# Patient Record
Sex: FEMALE | Race: WHITE | NOT HISPANIC OR LATINO | Employment: FULL TIME | URBAN - METROPOLITAN AREA
[De-identification: names, ages, dates, MRNs, and addresses within clinical notes are randomized per-mention and may not be internally consistent; named-entity substitution may affect disease eponyms.]

---

## 2023-03-07 ENCOUNTER — TELEPHONE (OUTPATIENT)
Dept: PRIMARY CARE | Facility: CLINIC | Age: 58
End: 2023-03-07
Payer: COMMERCIAL

## 2023-03-07 DIAGNOSIS — R92.8 ABNORMAL MAMMOGRAM OF LEFT BREAST: Primary | ICD-10-CM

## 2023-03-07 NOTE — TELEPHONE ENCOUNTER
----- Message from Miranda BOSS DO sent at 3/7/2023 11:42 AM EST -----  Mammogram showed abnormal left breast on mammogram  Will need diagnostic with ultrasound; orders placed

## 2023-03-27 ENCOUNTER — TELEPHONE (OUTPATIENT)
Dept: PRIMARY CARE | Facility: CLINIC | Age: 58
End: 2023-03-27
Payer: COMMERCIAL

## 2023-03-27 NOTE — TELEPHONE ENCOUNTER
----- Message from Miranda BOSS DO sent at 3/26/2023  7:03 PM EDT -----  Ultrasound of breast showed a benign cyst where mammogram saw abnormality   Return to 1 year screening

## 2023-10-11 PROBLEM — B02.9 HERPES ZOSTER WITHOUT COMPLICATION: Status: ACTIVE | Noted: 2023-10-11

## 2023-10-11 PROBLEM — R74.8 ELEVATED ALKALINE PHOSPHATASE LEVEL: Status: ACTIVE | Noted: 2023-10-11

## 2023-10-11 PROBLEM — M54.50 LOW BACK PAIN: Status: ACTIVE | Noted: 2023-10-11

## 2023-10-11 PROBLEM — M25.569 KNEE PAIN: Status: ACTIVE | Noted: 2023-10-11

## 2023-10-11 PROBLEM — E66.01 MORBID OBESITY WITH BODY MASS INDEX (BMI) OF 40.0 OR HIGHER (MULTI): Status: ACTIVE | Noted: 2023-10-11

## 2023-10-11 PROBLEM — M17.11 PRIMARY LOCALIZED OSTEOARTHROSIS OF RIGHT LOWER LEG: Status: ACTIVE | Noted: 2023-10-11

## 2023-10-11 RX ORDER — MELATONIN 10 MG
10 CAPSULE ORAL NIGHTLY PRN
COMMUNITY

## 2023-10-11 RX ORDER — OXYCODONE HYDROCHLORIDE 5 MG/1
TABLET ORAL
COMMUNITY
Start: 2023-02-05 | End: 2023-11-03 | Stop reason: WASHOUT

## 2023-10-11 RX ORDER — ACETAMINOPHEN AND CODEINE PHOSPHATE 300; 30 MG/1; MG/1
1 TABLET ORAL EVERY 4 HOURS PRN
COMMUNITY
Start: 2022-12-28 | End: 2023-11-03 | Stop reason: WASHOUT

## 2023-10-11 RX ORDER — OFLOXACIN 3 MG/ML
SOLUTION/ DROPS OPHTHALMIC
COMMUNITY
Start: 2022-12-27 | End: 2023-11-03 | Stop reason: WASHOUT

## 2023-10-11 RX ORDER — MULTIVITAMIN
1 TABLET ORAL DAILY
COMMUNITY

## 2023-10-11 RX ORDER — BRIMONIDINE TARTRATE, TIMOLOL MALEATE 2; 5 MG/ML; MG/ML
SOLUTION/ DROPS OPHTHALMIC
COMMUNITY
Start: 2023-01-06 | End: 2023-11-03 | Stop reason: WASHOUT

## 2023-10-11 RX ORDER — ACETAMINOPHEN AND DIPHENHYDRAMINE HYDROCHLORIDE 500; 25 MG/1; MG/1
1 TABLET, FILM COATED ORAL NIGHTLY PRN
COMMUNITY

## 2023-10-11 RX ORDER — PREDNISOLONE ACETATE 10 MG/ML
SUSPENSION/ DROPS OPHTHALMIC
COMMUNITY
Start: 2022-12-27 | End: 2023-11-03 | Stop reason: WASHOUT

## 2023-11-03 ENCOUNTER — OFFICE VISIT (OUTPATIENT)
Dept: PRIMARY CARE | Facility: CLINIC | Age: 58
End: 2023-11-03
Payer: COMMERCIAL

## 2023-11-03 ENCOUNTER — ANCILLARY PROCEDURE (OUTPATIENT)
Dept: RADIOLOGY | Facility: CLINIC | Age: 58
End: 2023-11-03
Payer: COMMERCIAL

## 2023-11-03 VITALS
WEIGHT: 270 LBS | BODY MASS INDEX: 40.92 KG/M2 | DIASTOLIC BLOOD PRESSURE: 82 MMHG | HEIGHT: 68 IN | RESPIRATION RATE: 18 BRPM | HEART RATE: 80 BPM | SYSTOLIC BLOOD PRESSURE: 116 MMHG

## 2023-11-03 DIAGNOSIS — F41.9 ANXIETY: ICD-10-CM

## 2023-11-03 DIAGNOSIS — Z13.29 SCREENING FOR THYROID DISORDER: ICD-10-CM

## 2023-11-03 DIAGNOSIS — Z13.1 SCREENING FOR DIABETES MELLITUS: ICD-10-CM

## 2023-11-03 DIAGNOSIS — E66.01 MORBID OBESITY WITH BODY MASS INDEX (BMI) OF 40.0 OR HIGHER (MULTI): ICD-10-CM

## 2023-11-03 DIAGNOSIS — Z12.31 ENCOUNTER FOR SCREENING MAMMOGRAM FOR MALIGNANT NEOPLASM OF BREAST: ICD-10-CM

## 2023-11-03 DIAGNOSIS — Z00.00 ROUTINE GENERAL MEDICAL EXAMINATION AT A HEALTH CARE FACILITY: Primary | ICD-10-CM

## 2023-11-03 DIAGNOSIS — Z23 NEED FOR INFLUENZA VACCINATION: ICD-10-CM

## 2023-11-03 DIAGNOSIS — S89.90XA KNEE INJURY, INITIAL ENCOUNTER: ICD-10-CM

## 2023-11-03 DIAGNOSIS — Z13.220 SCREENING FOR LIPID DISORDERS: ICD-10-CM

## 2023-11-03 DIAGNOSIS — Z72.820 POOR SLEEP: ICD-10-CM

## 2023-11-03 PROBLEM — B02.9 HERPES ZOSTER WITHOUT COMPLICATION: Status: RESOLVED | Noted: 2023-10-11 | Resolved: 2023-11-03

## 2023-11-03 LAB
ALBUMIN SERPL BCP-MCNC: 4.3 G/DL (ref 3.4–5)
ALP SERPL-CCNC: 98 U/L (ref 33–110)
ALT SERPL W P-5'-P-CCNC: 18 U/L (ref 7–45)
ANION GAP SERPL CALC-SCNC: 13 MMOL/L (ref 10–20)
APPEARANCE UR: CLEAR
AST SERPL W P-5'-P-CCNC: 13 U/L (ref 9–39)
BILIRUB SERPL-MCNC: 0.6 MG/DL (ref 0–1.2)
BILIRUB UR STRIP.AUTO-MCNC: NEGATIVE MG/DL
BUN SERPL-MCNC: 13 MG/DL (ref 6–23)
CALCIUM SERPL-MCNC: 9.3 MG/DL (ref 8.6–10.6)
CHLORIDE SERPL-SCNC: 108 MMOL/L (ref 98–107)
CHOLEST SERPL-MCNC: 141 MG/DL (ref 0–199)
CHOLESTEROL/HDL RATIO: 2.7
CO2 SERPL-SCNC: 24 MMOL/L (ref 21–32)
COLOR UR: ABNORMAL
CREAT SERPL-MCNC: 0.87 MG/DL (ref 0.5–1.05)
ERYTHROCYTE [DISTWIDTH] IN BLOOD BY AUTOMATED COUNT: 13.4 % (ref 11.5–14.5)
EST. AVERAGE GLUCOSE BLD GHB EST-MCNC: 97 MG/DL
GFR SERPL CREATININE-BSD FRML MDRD: 77 ML/MIN/1.73M*2
GLUCOSE SERPL-MCNC: 73 MG/DL (ref 74–99)
GLUCOSE UR STRIP.AUTO-MCNC: NEGATIVE MG/DL
HBA1C MFR BLD: 5 %
HCT VFR BLD AUTO: 45.8 % (ref 36–46)
HDLC SERPL-MCNC: 51.3 MG/DL
HGB BLD-MCNC: 14.8 G/DL (ref 12–16)
KETONES UR STRIP.AUTO-MCNC: NEGATIVE MG/DL
LDLC SERPL CALC-MCNC: 78 MG/DL
LEUKOCYTE ESTERASE UR QL STRIP.AUTO: NEGATIVE
MCH RBC QN AUTO: 29 PG (ref 26–34)
MCHC RBC AUTO-ENTMCNC: 32.3 G/DL (ref 32–36)
MCV RBC AUTO: 90 FL (ref 80–100)
NITRITE UR QL STRIP.AUTO: NEGATIVE
NON HDL CHOLESTEROL: 90 MG/DL (ref 0–149)
NRBC BLD-RTO: 0 /100 WBCS (ref 0–0)
PH UR STRIP.AUTO: 7 [PH]
PLATELET # BLD AUTO: 270 X10*3/UL (ref 150–450)
POTASSIUM SERPL-SCNC: 4.1 MMOL/L (ref 3.5–5.3)
PROT SERPL-MCNC: 6.3 G/DL (ref 6.4–8.2)
PROT UR STRIP.AUTO-MCNC: NEGATIVE MG/DL
RBC # BLD AUTO: 5.1 X10*6/UL (ref 4–5.2)
RBC # UR STRIP.AUTO: NEGATIVE /UL
SODIUM SERPL-SCNC: 141 MMOL/L (ref 136–145)
SP GR UR STRIP.AUTO: 1
TRIGL SERPL-MCNC: 60 MG/DL (ref 0–149)
TSH SERPL-ACNC: 2.3 MIU/L (ref 0.44–3.98)
UROBILINOGEN UR STRIP.AUTO-MCNC: <2 MG/DL
VLDL: 12 MG/DL (ref 0–40)
WBC # BLD AUTO: 7.4 X10*3/UL (ref 4.4–11.3)

## 2023-11-03 PROCEDURE — 83036 HEMOGLOBIN GLYCOSYLATED A1C: CPT

## 2023-11-03 PROCEDURE — 73564 X-RAY EXAM KNEE 4 OR MORE: CPT | Mod: LT,FY

## 2023-11-03 PROCEDURE — 80053 COMPREHEN METABOLIC PANEL: CPT

## 2023-11-03 PROCEDURE — 99396 PREV VISIT EST AGE 40-64: CPT | Performed by: INTERNAL MEDICINE

## 2023-11-03 PROCEDURE — 80061 LIPID PANEL: CPT

## 2023-11-03 PROCEDURE — 36415 COLL VENOUS BLD VENIPUNCTURE: CPT

## 2023-11-03 PROCEDURE — 81003 URINALYSIS AUTO W/O SCOPE: CPT

## 2023-11-03 PROCEDURE — 90686 IIV4 VACC NO PRSV 0.5 ML IM: CPT | Performed by: INTERNAL MEDICINE

## 2023-11-03 PROCEDURE — 1036F TOBACCO NON-USER: CPT | Performed by: INTERNAL MEDICINE

## 2023-11-03 PROCEDURE — 90471 IMMUNIZATION ADMIN: CPT | Performed by: INTERNAL MEDICINE

## 2023-11-03 PROCEDURE — 85027 COMPLETE CBC AUTOMATED: CPT

## 2023-11-03 PROCEDURE — 73564 X-RAY EXAM KNEE 4 OR MORE: CPT | Mod: LEFT SIDE | Performed by: RADIOLOGY

## 2023-11-03 PROCEDURE — 84443 ASSAY THYROID STIM HORMONE: CPT

## 2023-11-03 RX ORDER — TRAZODONE HYDROCHLORIDE 50 MG/1
50 TABLET ORAL NIGHTLY
Qty: 30 TABLET | Refills: 0 | Status: SHIPPED | OUTPATIENT
Start: 2023-11-03 | End: 2023-11-13 | Stop reason: SDUPTHER

## 2023-11-03 ASSESSMENT — ENCOUNTER SYMPTOMS
NAUSEA: 0
DIARRHEA: 0
SHORTNESS OF BREATH: 0
NECK PAIN: 0
NUMBNESS: 0
APNEA: 0
TREMORS: 0
MYALGIAS: 0
AGITATION: 0
CONSTIPATION: 0
RECTAL PAIN: 0
DECREASED CONCENTRATION: 0
COLOR CHANGE: 0
ADENOPATHY: 0
NECK STIFFNESS: 0
DIFFICULTY URINATING: 0
HEADACHES: 0
SPEECH DIFFICULTY: 0
DIZZINESS: 0
POLYPHAGIA: 0
SORE THROAT: 0
PHOTOPHOBIA: 0
JOINT SWELLING: 0
STRIDOR: 0
ABDOMINAL PAIN: 0
FATIGUE: 0
ACTIVITY CHANGE: 0
EYE REDNESS: 0
VOICE CHANGE: 0
BACK PAIN: 0
COUGH: 0
FEVER: 0
TROUBLE SWALLOWING: 0
WEAKNESS: 0
HEMATURIA: 0
ARTHRALGIAS: 1
NERVOUS/ANXIOUS: 1
LIGHT-HEADEDNESS: 0
HYPERACTIVE: 0
CHEST TIGHTNESS: 0
UNEXPECTED WEIGHT CHANGE: 0
CONFUSION: 0
CHILLS: 0
BLOOD IN STOOL: 0
POLYDIPSIA: 0
ANAL BLEEDING: 0
EYE ITCHING: 0
PALPITATIONS: 0
DIAPHORESIS: 0
SINUS PAIN: 0
APPETITE CHANGE: 0
FACIAL ASYMMETRY: 0
FREQUENCY: 0
SLEEP DISTURBANCE: 1
ABDOMINAL DISTENTION: 0
EYE PAIN: 0
BRUISES/BLEEDS EASILY: 0
FLANK PAIN: 0
SEIZURES: 0
RHINORRHEA: 0
VOMITING: 0
FACIAL SWELLING: 0
WHEEZING: 0
DYSPHORIC MOOD: 0
HALLUCINATIONS: 0
SINUS PRESSURE: 0
DYSURIA: 0
CHOKING: 0
EYE DISCHARGE: 0
WOUND: 0

## 2023-11-03 NOTE — PROGRESS NOTES
Subjective   Patient ID: Cristiana Huddleston is a 57 y.o. female who presents for Annual Exam.    HPI    Pt here for physical. She is up in weight-10 lbs since last year.  She fell in 10/2023 and so now is limping and using cane to ambulate.  She fell going up a step and onto her left knee.  It is still swollen, bruised and painful.  She is wearing an elastic brace and using cane.  She did not seek care for this.    She also fell back in 2/2023 and broke her left elbow.  No surgery needed-just casted.      She has more stress with her  who had a TBI and has mood/personality issues.  She does not sleep well and often wakes and mind is racing.      She has not seen a GYN in many years.  She last had pap in 2019 with negative HPV.  No pelvic issues or urinary issues.      She had abnormal mammogram back in 3/2023 but on repeat with ultrasound was noted to be benign.  No breast issues.      She did cologuard back in 12/2022 and it was negative.  No GI issues.      She sees derm regularly and had some skin lesions removed back in summer.  They were benign in nature.    Review of Systems   Constitutional:  Negative for activity change, appetite change, chills, diaphoresis, fatigue, fever and unexpected weight change.   HENT:  Negative for congestion, dental problem, drooling, ear discharge, ear pain, facial swelling, hearing loss, mouth sores, nosebleeds, postnasal drip, rhinorrhea, sinus pressure, sinus pain, sneezing, sore throat, tinnitus, trouble swallowing and voice change.    Eyes:  Positive for visual disturbance (wears glasses-had recent exam). Negative for photophobia, pain, discharge, redness and itching.   Respiratory:  Negative for apnea, cough, choking, chest tightness, shortness of breath, wheezing and stridor.    Cardiovascular:  Negative for chest pain, palpitations and leg swelling.   Gastrointestinal:  Negative for abdominal distention, abdominal pain, anal bleeding, blood in stool, constipation,  "diarrhea, nausea, rectal pain and vomiting.   Endocrine: Negative for cold intolerance, heat intolerance, polydipsia, polyphagia and polyuria.   Genitourinary:  Negative for decreased urine volume, difficulty urinating, dyspareunia, dysuria, enuresis, flank pain, frequency, genital sores, hematuria, menstrual problem, pelvic pain, urgency, vaginal bleeding, vaginal discharge and vaginal pain.   Musculoskeletal:  Positive for arthralgias. Negative for back pain, gait problem, joint swelling, myalgias, neck pain and neck stiffness.   Skin:  Negative for color change, pallor, rash and wound.   Allergic/Immunologic: Negative for environmental allergies, food allergies and immunocompromised state.   Neurological:  Negative for dizziness, tremors, seizures, syncope, facial asymmetry, speech difficulty, weakness, light-headedness, numbness and headaches.   Hematological:  Negative for adenopathy. Does not bruise/bleed easily.   Psychiatric/Behavioral:  Positive for sleep disturbance. Negative for agitation, behavioral problems, confusion, decreased concentration, dysphoric mood, hallucinations, self-injury and suicidal ideas. The patient is nervous/anxious. The patient is not hyperactive.        Objective   /82 (BP Location: Left arm, Patient Position: Sitting)   Pulse 80   Resp 18   Ht 1.734 m (5' 8.25\")   Wt 122 kg (270 lb)   BMI 40.75 kg/m²    Physical Exam  Constitutional:       Appearance: Normal appearance.   HENT:      Head: Normocephalic and atraumatic.      Right Ear: Tympanic membrane, ear canal and external ear normal. There is no impacted cerumen.      Left Ear: Tympanic membrane, ear canal and external ear normal. There is no impacted cerumen.      Nose: Nose normal. No congestion or rhinorrhea.      Mouth/Throat:      Mouth: Mucous membranes are moist.      Pharynx: Oropharynx is clear. No oropharyngeal exudate or posterior oropharyngeal erythema.   Eyes:      Extraocular Movements: Extraocular " movements intact.      Conjunctiva/sclera: Conjunctivae normal.      Pupils: Pupils are equal, round, and reactive to light.   Neck:      Vascular: No carotid bruit.   Cardiovascular:      Rate and Rhythm: Normal rate and regular rhythm.      Pulses: Normal pulses.      Heart sounds: Normal heart sounds. No murmur heard.  Pulmonary:      Effort: Pulmonary effort is normal. No respiratory distress.      Breath sounds: Normal breath sounds. No wheezing, rhonchi or rales.   Abdominal:      General: Abdomen is flat. Bowel sounds are normal. There is no distension.      Palpations: Abdomen is soft.      Tenderness: There is no abdominal tenderness.      Hernia: No hernia is present.   Musculoskeletal:         General: No swelling or tenderness. Normal range of motion.      Cervical back: Normal range of motion and neck supple.      Right lower leg: No edema.      Left lower leg: No edema.   Lymphadenopathy:      Cervical: No cervical adenopathy.   Skin:     General: Skin is warm and dry.      Findings: No lesion or rash.   Neurological:      General: No focal deficit present.      Mental Status: She is alert and oriented to person, place, and time.      Cranial Nerves: No cranial nerve deficit.      Sensory: No sensory deficit.      Motor: No weakness.   Psychiatric:         Mood and Affect: Mood normal.         Behavior: Behavior normal.         Thought Content: Thought content normal.         Judgment: Judgment normal.         Assessment/Plan   Problem List Items Addressed This Visit       Morbid obesity with body mass index (BMI) of 40.0 or higher (CMS/HCC)     Pt asking about weight loss medications  Did discuss wegovy possible use after 1st of year  Can consider contrave as she admits to stress eating and/or topiramate/metformin use  Explained want to get labs on her as well as check on left knee injury before we decide on weight loss meds  Work on better diet for now-admits to eating a lot of processed foods  currently with limited biking ability due to injury          Relevant Orders    Follow Up In Primary Care - Established    Anxiety     Having some increase stress with  and his health issues  She is not sleeping well  Will try trazodone first and see if we can improve sleep which will help overall anxiety          Relevant Medications    traZODone (Desyrel) 50 mg tablet     Other Visit Diagnoses       Routine general medical examination at a health care facility    -  Primary    Relevant Orders    Comprehensive Metabolic Panel    CBC    Urinalysis with Reflex Microscopic    Knee injury, initial encounter        Relevant Orders    Disability Placard    Follow Up In Primary Care - Established    Need for influenza vaccination        Relevant Orders    Flu vaccine (IIV4) age 6 months and greater, preservative free (Completed)    Screening for thyroid disorder        Relevant Orders    TSH    Screening for lipid disorders        Relevant Orders    Lipid Panel    Screening for diabetes mellitus        Relevant Orders    Hemoglobin A1C    Encounter for screening mammogram for malignant neoplasm of breast        Relevant Orders    BI mammo bilateral screening tomosynthesis    Poor sleep        Relevant Medications    traZODone (Desyrel) 50 mg tablet

## 2023-11-03 NOTE — ASSESSMENT & PLAN NOTE
Having some increase stress with  and his health issues  She is not sleeping well  Will try trazodone first and see if we can improve sleep which will help overall anxiety

## 2023-11-03 NOTE — ASSESSMENT & PLAN NOTE
Pt asking about weight loss medications  Did discuss wegovy possible use after 1st of year  Can consider contrave as she admits to stress eating and/or topiramate/metformin use  Explained want to get labs on her as well as check on left knee injury before we decide on weight loss meds  Work on better diet for now-admits to eating a lot of processed foods currently with limited biking ability due to injury

## 2023-11-06 ENCOUNTER — TELEPHONE (OUTPATIENT)
Dept: PRIMARY CARE | Facility: CLINIC | Age: 58
End: 2023-11-06
Payer: COMMERCIAL

## 2023-11-06 DIAGNOSIS — M17.12 LOCALIZED OSTEOARTHRITIS OF LEFT KNEE: Primary | ICD-10-CM

## 2023-11-06 NOTE — TELEPHONE ENCOUNTER
----- Message from Miranda BOSS DO sent at 11/4/2023  7:01 PM EDT -----  Xray has advanced arthritis of left knee-recommend ortho consult as likely would need cortisol injection or gel injection and discuss knee replacement

## 2023-11-06 NOTE — TELEPHONE ENCOUNTER
Patient informed, will proceed with Ortho Consult, I did give her the number of the Ortho downstairs.

## 2023-11-13 ENCOUNTER — TELEPHONE (OUTPATIENT)
Dept: PRIMARY CARE | Facility: CLINIC | Age: 58
End: 2023-11-13
Payer: COMMERCIAL

## 2023-11-13 DIAGNOSIS — Z72.820 POOR SLEEP: ICD-10-CM

## 2023-11-13 DIAGNOSIS — F41.9 ANXIETY: ICD-10-CM

## 2023-11-13 RX ORDER — TRAZODONE HYDROCHLORIDE 50 MG/1
75 TABLET ORAL NIGHTLY
Qty: 135 TABLET | Refills: 1 | Status: SHIPPED | OUTPATIENT
Start: 2023-11-13 | End: 2024-02-09 | Stop reason: SDUPTHER

## 2023-11-13 NOTE — TELEPHONE ENCOUNTER
Can increase to 1.5 tablets of trazodone to start so 75 mg/night  Ok to write on prescription for therapy pool use

## 2023-11-13 NOTE — TELEPHONE ENCOUNTER
PATIENT taking medication for sleep that you gave her- trazodone it is helping she gets an extra hour of sleep with 15mg can she increase it? Would like to get 8 hours of sleep instead of 6-7 hours.  Also, she wants to use the [facility] therapy pool for her left knee injury, but she needs a note from you. Can you write she wants to  this Friday.      Please advise

## 2023-11-17 ENCOUNTER — OFFICE VISIT (OUTPATIENT)
Dept: ORTHOPEDIC SURGERY | Facility: CLINIC | Age: 58
End: 2023-11-17
Payer: COMMERCIAL

## 2023-11-17 DIAGNOSIS — M17.12 ARTHRITIS OF KNEE, LEFT: Primary | ICD-10-CM

## 2023-11-17 DIAGNOSIS — G89.29 CHRONIC PAIN OF LEFT KNEE: ICD-10-CM

## 2023-11-17 DIAGNOSIS — M25.562 CHRONIC PAIN OF LEFT KNEE: ICD-10-CM

## 2023-11-17 PROCEDURE — 99203 OFFICE O/P NEW LOW 30 MIN: CPT | Performed by: FAMILY MEDICINE

## 2023-11-17 PROCEDURE — 20611 DRAIN/INJ JOINT/BURSA W/US: CPT | Performed by: FAMILY MEDICINE

## 2023-11-17 PROCEDURE — 1036F TOBACCO NON-USER: CPT | Performed by: FAMILY MEDICINE

## 2023-11-17 RX ORDER — LIDOCAINE HYDROCHLORIDE 10 MG/ML
2 INJECTION INFILTRATION; PERINEURAL
Status: COMPLETED | OUTPATIENT
Start: 2023-11-17 | End: 2023-11-17

## 2023-11-17 RX ORDER — TRIAMCINOLONE ACETONIDE 40 MG/ML
40 INJECTION, SUSPENSION INTRA-ARTICULAR; INTRAMUSCULAR
Status: COMPLETED | OUTPATIENT
Start: 2023-11-17 | End: 2023-11-17

## 2023-11-17 RX ADMIN — LIDOCAINE HYDROCHLORIDE 2 ML: 10 INJECTION INFILTRATION; PERINEURAL at 09:20

## 2023-11-17 RX ADMIN — TRIAMCINOLONE ACETONIDE 40 MG: 40 INJECTION, SUSPENSION INTRA-ARTICULAR; INTRAMUSCULAR at 09:20

## 2023-11-17 NOTE — LETTER
"November 17, 2023     Miranda BOSS DO  [address] [facility] [address]    Patient: Cristiana Huddleston   YOB: 1965   Date of Visit: 11/17/2023       Dear Dr. Miranda BOSS DO:    Thank you for referring Cristiana Huddleston to me for evaluation. Below are my notes for this consultation.  If you have questions, please do not hesitate to call me. I look forward to following your patient along with you.       Sincerely,     Norris Smith MD      CC: No Recipients  ______________________________________________________________________________________      History of Present Illness   Chief Complaint   Patient presents with   • Left Knee - Pain     FOR YEARS.  NKI.       The patient is 57 y.o. female  here with a complaint of left knee pain, referred by PCP Miranda Weber.  Patient has had some longstanding left knee pain, said that she had arthroscopic knee surgery in 2004, describes \"knee cleanout\" procedure with good improvement in symptoms, had some recurrence of pain in 2019, was seen provider through [facility], said that she had an injection which did provide some relief in symptoms and was doing well, did not follow back up because of COVID pandemic.  Over the past year or so has been dealing with some ongoing pain but says symptoms worsened after a fall last month, tripped landing on her knee, says that she did not seek any immediate medical attention, has been using a cane to assist in ambulation since then, prior to her fall says she was active walking at least a mile a day and using a stationary bike for 30 minutes, she has been working back up to increasing her activity but says she is unable to bike 5 to 10 minutes and is walking with a cane 0.5 to 1 miles.  Pain is somewhat diffuse, more prominent over the medial aspect of her knee, admits to some stiffness, says there was some swelling and a lateral knee wound which have improved.  She denies any locking or catching.  She takes " over-the-counter medications as needed for pain.  She did see her PCP recently who obtained x-rays which showed advanced medial compartment and patellofemoral compartment predominant degenerative changes, recommended outpatient orthopedic follow-up    Past Medical History:   Diagnosis Date   • Encounter for follow-up examination after completed treatment for conditions other than malignant neoplasm 09/05/2018    Hospital discharge follow-up   • Encounter for gynecological examination (general) (routine) without abnormal findings 11/04/2019    Well female exam with routine gynecological exam   • Herpes zoster without complication 10/11/2023   • History of falling 09/05/2018    Status post fall   • Obesity, unspecified 10/30/2019    Class 1 obesity with body mass index (BMI) of 34.0 to 34.9 in adult, unspecified obesity type, unspecified whether serious comorbidity present   • Personal history of other diseases of the musculoskeletal system and connective tissue     History of arthritis   • Personal history of other infectious and parasitic diseases     History of chickenpox       Medication Documentation Review Audit       Reviewed by Miranda BOSS DO (Physician) on 11/03/23 at 0927      Medication Order Taking? Sig Documenting Provider Last Dose Status   Discontinued 11/03/23 0913   Discontinued 11/03/23 0913   diphenhydrAMINE-acetaminophen (Tylenol PM Extra Strength)  mg per tablet [id number] Yes Take by mouth. Historical Provider, MD Taking Active   melatonin 10 mg capsule [id number] Yes Take by mouth. Historical Provider, MD Taking Active   multivitamin (Daily Multi-Vitamin) tablet [id number] Yes Take 1 tablet by mouth once daily. Historical Provider, MD Taking Active   Discontinued 11/03/23 0927   Discontinued 11/03/23 0927   Discontinued 11/03/23 0927                    No Known Allergies    Social History     Socioeconomic History   • Marital status:      Spouse name: Not on file   • Number of  children: 0   • Years of education: Not on file   • Highest education level: Not on file   Occupational History   • Occupation: principal    Tobacco Use   • Smoking status: Never   • Smokeless tobacco: Never   Vaping Use   • Vaping Use: Never used   Substance and Sexual Activity   • Alcohol use: Not Currently   • Drug use: Never   • Sexual activity: Yes     Partners: Male   Other Topics Concern   • Not on file   Social History Narrative   • Not on file     Social Determinants of Health     Financial Resource Strain: Not on file   Food Insecurity: Not on file   Transportation Needs: Not on file   Physical Activity: Not on file   Stress: Not on file   Social Connections: Not on file   Intimate Partner Violence: Not on file   Housing Stability: Not on file       Past Surgical History:   Procedure Laterality Date   • APPENDECTOMY     • DILATION AND CURETTAGE OF UTERUS     • KNEE SURGERY Bilateral 09/05/2018    Knee Surgery; meniscal tears with repair   • OTHER SURGICAL HISTORY  10/09/2020    Corneal lasik   • OTHER SURGICAL HISTORY  12/27/2019    Dilation and curettage   • OTHER SURGICAL HISTORY  12/27/2019    Breast augmentation   • RETINAL DETACHMENT REPAIR W/ SCLERAL BUCKLE LE Right 12/19/2022          Review of Systems   GENERAL: Negative  GI: Negative  MUSCULOSKELETAL: See HPI  SKIN: Negative  NEURO:  Negative    BMI: 39.6     Physical Exam:    General/Constitutional: well appearing, no distress, appears stated age.  Obese  HEENT: sclera clear  Respiratory: non labored breathing  Vascular: No edema, swelling or tenderness, except as noted in detailed exam.  Integumentary: No impressive skin lesions present, except as noted in detailed exam.  Neurological:  Alert and oriented   Psychological:  Normal mood and affect.  Musculoskeletal: Normal, except as noted in detailed exam and in HPI.  Antalgic gait with cane    Left knee: Healing wound over the lateral aspect of her knee, otherwise normal  appearance.  She has tenderness palpation most prominent at the medial joint line, there is mild lateral joint line tenderness.  She had limited range of motion from 3 to 90 degrees with pain at her end range of flexion.  She has pain and mild weakness with resisted knee flexion, no pain or weakness with resisted knee extension.  There is no gross ligamentous laxity present.       Imaging: X-rays of left knee from 11/3/2023 independently reviewed, there is advanced degenerative changes most prominent in the medial and patellofemoral compartments    L Inj/Asp: L knee on 11/17/2023 9:20 AM  Indications: pain  Details: 22 G needle, ultrasound-guided superolateral approach  Medications: 40 mg triamcinolone acetonide 40 mg/mL; 2 mL lidocaine 10 mg/mL (1 %)  Outcome: tolerated well, no immediate complications  Procedure, treatment alternatives, risks and benefits explained, specific risks discussed. Consent was given by the patient. Immediately prior to procedure a time out was called to verify the correct patient, procedure, equipment, support staff and site/side marked as required. Patient was prepped and draped in the usual sterile fashion.               Assessment   1. Arthritis of knee, left  Point of Care Ultrasound    Referral to Physical Therapy      2. Chronic pain of left knee              Plan: Discussed diagnosis, further work-up and treatment.  Decision made to proceed with knee joint injection with Kenalog today, referral to physical therapy was also placed.  Patient is contemplating more definitive management knee replacement surgery pending response to injection and physical therapy.  I like her to follow-up in 2 months for reassessment.

## 2023-11-17 NOTE — PROGRESS NOTES
"  History of Present Illness   Chief Complaint   Patient presents with    Left Knee - Pain     FOR YEARS.  NKI.       The patient is 57 y.o. female  here with a complaint of left knee pain, referred by PCP Miranda Weber.  Patient has had some longstanding left knee pain, said that she had arthroscopic knee surgery in 2004, describes \"knee cleanout\" procedure with good improvement in symptoms, had some recurrence of pain in 2019, was seen provider through [facility], said that she had an injection which did provide some relief in symptoms and was doing well, did not follow back up because of COVID pandemic.  Over the past year or so has been dealing with some ongoing pain but says symptoms worsened after a fall last month, tripped landing on her knee, says that she did not seek any immediate medical attention, has been using a cane to assist in ambulation since then, prior to her fall says she was active walking at least a mile a day and using a stationary bike for 30 minutes, she has been working back up to increasing her activity but says she is unable to bike 5 to 10 minutes and is walking with a cane 0.5 to 1 miles.  Pain is somewhat diffuse, more prominent over the medial aspect of her knee, admits to some stiffness, says there was some swelling and a lateral knee wound which have improved.  She denies any locking or catching.  She takes over-the-counter medications as needed for pain.  She did see her PCP recently who obtained x-rays which showed advanced medial compartment and patellofemoral compartment predominant degenerative changes, recommended outpatient orthopedic follow-up    Past Medical History:   Diagnosis Date    Encounter for follow-up examination after completed treatment for conditions other than malignant neoplasm 09/05/2018    Hospital discharge follow-up    Encounter for gynecological examination (general) (routine) without abnormal findings 11/04/2019    Well female exam with routine " gynecological exam    Herpes zoster without complication 10/11/2023    History of falling 09/05/2018    Status post fall    Obesity, unspecified 10/30/2019    Class 1 obesity with body mass index (BMI) of 34.0 to 34.9 in adult, unspecified obesity type, unspecified whether serious comorbidity present    Personal history of other diseases of the musculoskeletal system and connective tissue     History of arthritis    Personal history of other infectious and parasitic diseases     History of chickenpox       Medication Documentation Review Audit       Reviewed by Miranda BOSS DO (Physician) on 11/03/23 at 0927      Medication Order Taking? Sig Documenting Provider Last Dose Status   Discontinued 11/03/23 0913   Discontinued 11/03/23 0913   diphenhydrAMINE-acetaminophen (Tylenol PM Extra Strength)  mg per tablet [id number] Yes Take by mouth. Historical Provider, MD Taking Active   melatonin 10 mg capsule [id number] Yes Take by mouth. Historical Provider, MD Taking Active   multivitamin (Daily Multi-Vitamin) tablet [id number] Yes Take 1 tablet by mouth once daily. Historical Provider, MD Taking Active   Discontinued 11/03/23 0927   Discontinued 11/03/23 0927   Discontinued 11/03/23 0927                    No Known Allergies    Social History     Socioeconomic History    Marital status:      Spouse name: Not on file    Number of children: 0    Years of education: Not on file    Highest education level: Not on file   Occupational History    Occupation: principal    Tobacco Use    Smoking status: Never    Smokeless tobacco: Never   Vaping Use    Vaping Use: Never used   Substance and Sexual Activity    Alcohol use: Not Currently    Drug use: Never    Sexual activity: Yes     Partners: Male   Other Topics Concern    Not on file   Social History Narrative    Not on file     Social Determinants of Health     Financial Resource Strain: Not on file   Food Insecurity: Not on file   Transportation Needs:  Not on file   Physical Activity: Not on file   Stress: Not on file   Social Connections: Not on file   Intimate Partner Violence: Not on file   Housing Stability: Not on file       Past Surgical History:   Procedure Laterality Date    APPENDECTOMY      DILATION AND CURETTAGE OF UTERUS      KNEE SURGERY Bilateral 09/05/2018    Knee Surgery; meniscal tears with repair    OTHER SURGICAL HISTORY  10/09/2020    Corneal lasik    OTHER SURGICAL HISTORY  12/27/2019    Dilation and curettage    OTHER SURGICAL HISTORY  12/27/2019    Breast augmentation    RETINAL DETACHMENT REPAIR W/ SCLERAL BUCKLE LE Right 12/19/2022          Review of Systems   GENERAL: Negative  GI: Negative  MUSCULOSKELETAL: See HPI  SKIN: Negative  NEURO:  Negative    BMI: 39.6     Physical Exam:    General/Constitutional: well appearing, no distress, appears stated age.  Obese  HEENT: sclera clear  Respiratory: non labored breathing  Vascular: No edema, swelling or tenderness, except as noted in detailed exam.  Integumentary: No impressive skin lesions present, except as noted in detailed exam.  Neurological:  Alert and oriented   Psychological:  Normal mood and affect.  Musculoskeletal: Normal, except as noted in detailed exam and in HPI.  Antalgic gait with cane    Left knee: Healing wound over the lateral aspect of her knee, otherwise normal appearance.  She has tenderness palpation most prominent at the medial joint line, there is mild lateral joint line tenderness.  She had limited range of motion from 3 to 90 degrees with pain at her end range of flexion.  She has pain and mild weakness with resisted knee flexion, no pain or weakness with resisted knee extension.  There is no gross ligamentous laxity present.       Imaging: X-rays of left knee from 11/3/2023 independently reviewed, there is advanced degenerative changes most prominent in the medial and patellofemoral compartments    L Inj/Asp: L knee on 11/17/2023 9:20 AM  Indications:  pain  Details: 22 G needle, ultrasound-guided superolateral approach  Medications: 40 mg triamcinolone acetonide 40 mg/mL; 2 mL lidocaine 10 mg/mL (1 %)  Outcome: tolerated well, no immediate complications  Procedure, treatment alternatives, risks and benefits explained, specific risks discussed. Consent was given by the patient. Immediately prior to procedure a time out was called to verify the correct patient, procedure, equipment, support staff and site/side marked as required. Patient was prepped and draped in the usual sterile fashion.               Assessment   1. Arthritis of knee, left  Point of Care Ultrasound    Referral to Physical Therapy      2. Chronic pain of left knee              Plan: Discussed diagnosis, further work-up and treatment.  Decision made to proceed with knee joint injection with Kenalog today, referral to physical therapy was also placed.  Patient is contemplating more definitive management knee replacement surgery pending response to injection and physical therapy.  I like her to follow-up in 2 months for reassessment.

## 2023-12-26 PROBLEM — S52.126D CLOSED NONDISPLACED FRACTURE OF HEAD OF RADIUS WITH ROUTINE HEALING: Status: ACTIVE | Noted: 2023-02-16

## 2024-01-12 ENCOUNTER — OFFICE VISIT (OUTPATIENT)
Dept: ORTHOPEDIC SURGERY | Facility: CLINIC | Age: 59
End: 2024-01-12
Payer: COMMERCIAL

## 2024-01-12 VITALS — BODY MASS INDEX: 36.08 KG/M2 | WEIGHT: 252 LBS | HEIGHT: 70 IN

## 2024-01-12 DIAGNOSIS — M17.12 ARTHRITIS OF KNEE, LEFT: Primary | ICD-10-CM

## 2024-01-12 PROCEDURE — 99213 OFFICE O/P EST LOW 20 MIN: CPT | Performed by: FAMILY MEDICINE

## 2024-01-12 PROCEDURE — 1036F TOBACCO NON-USER: CPT | Performed by: FAMILY MEDICINE

## 2024-01-12 NOTE — PROGRESS NOTES
History of Present Illness   Chief Complaint   Patient presents with    Left Knee - Follow-up       The patient is 58 y.o. female  here for follow-up of left knee pain.  Patient was seen by me initially 2 months ago, see previous note for full details.  In brief patient has been dealing with some chronic left knee pain, x-rays showed advanced degenerative changes, she was contemplating knee replacement surgery previously but COVID affected her ability to pursue this.  At our last visit we discussed treatment options, she was interested in trial of conservative management with knee joint injection as she had previous good relief with injections.  Unfortunately our injection only provided a few weeks of relief with recurrence of pain.  Pain over the medial and lateral aspect of her knee, some stiffness, some swelling.  She has been doing physical therapy, does feel stronger but still having pain, difficulty with stairs.  She is interested in pursuing surgical intervention at this time.      Past Medical History:   Diagnosis Date    Encounter for follow-up examination after completed treatment for conditions other than malignant neoplasm 09/05/2018    Hospital discharge follow-up    Encounter for gynecological examination (general) (routine) without abnormal findings 11/04/2019    Well female exam with routine gynecological exam    Herpes zoster without complication 10/11/2023    History of falling 09/05/2018    Status post fall    Obesity, unspecified 10/30/2019    Class 1 obesity with body mass index (BMI) of 34.0 to 34.9 in adult, unspecified obesity type, unspecified whether serious comorbidity present    Personal history of other diseases of the musculoskeletal system and connective tissue     History of arthritis    Personal history of other infectious and parasitic diseases     History of chickenpox       Medication Documentation Review Audit       Reviewed by Norris Smith MD (Physician) on 11/17/23 at 0921       Medication Order Taking? Sig Documenting Provider Last Dose Status   diphenhydrAMINE-acetaminophen (Tylenol PM Extra Strength)  mg per tablet [id number] No Take by mouth. Historical Provider, MD Taking Active   melatonin 10 mg capsule [id number] No Take by mouth. Historical Provider, MD Taking Active   multivitamin (Daily Multi-Vitamin) tablet [id number] No Take 1 tablet by mouth once daily. Historical Provider, MD Taking Active     Discontinued 11/13/23 1523   traZODone (Desyrel) 50 mg tablet [id number]  Take 1.5 tablets (75 mg) by mouth once daily at bedtime. Miranda Weber V, DO  Active                    Allergies   Allergen Reactions    Chicken Feathers Allergenic Extract Hives     Chicken Feathers: only where skin has been in contact with feathers       Social History     Socioeconomic History    Marital status:      Spouse name: Not on file    Number of children: 0    Years of education: Not on file    Highest education level: Not on file   Occupational History    Occupation: principal    Tobacco Use    Smoking status: Never    Smokeless tobacco: Never   Vaping Use    Vaping Use: Never used   Substance and Sexual Activity    Alcohol use: Not Currently    Drug use: Never    Sexual activity: Yes     Partners: Male   Other Topics Concern    Not on file   Social History Narrative    Not on file     Social Determinants of Health     Financial Resource Strain: Not on file   Food Insecurity: Not on file   Transportation Needs: Not on file   Physical Activity: Not on file   Stress: Not on file   Social Connections: Not on file   Intimate Partner Violence: Not on file   Housing Stability: Not on file       Past Surgical History:   Procedure Laterality Date    APPENDECTOMY      DILATION AND CURETTAGE OF UTERUS      KNEE SURGERY Bilateral 09/05/2018    Knee Surgery; meniscal tears with repair    OTHER SURGICAL HISTORY  10/09/2020    Corneal lasik    OTHER SURGICAL HISTORY  12/27/2019    Dilation  and curettage    OTHER SURGICAL HISTORY  12/27/2019    Breast augmentation    RETINAL DETACHMENT REPAIR W/ SCLERAL BUCKLE LE Right 12/19/2022          Review of Systems   GENERAL: Negative  GI: Negative  MUSCULOSKELETAL: See HPI  SKIN: Negative  NEURO:  Negative    BMI: 39.6     Physical Exam:    General/Constitutional: well appearing, no distress, appears stated age.  Obese  HEENT: sclera clear  Respiratory: non labored breathing  Vascular: No edema, swelling or tenderness, except as noted in detailed exam.  Integumentary: No impressive skin lesions present, except as noted in detailed exam.  Neurological:  Alert and oriented   Psychological:  Normal mood and affect.  Musculoskeletal: Normal, except as noted in detailed exam and in HPI.  Antalgic gait with cane    Left knee:  normal appearance, no skin changes, no swelling, no redness or warmth. she has tenderness palpation most prominent at the medial joint line, there is mild lateral joint line tenderness.  She had limited range of motion from 3 to 105 degrees with pain at her end range of flexion.  She has pain and mild weakness with resisted knee flexion, no pain or weakness with resisted knee extension.  There is no gross ligamentous laxity present.       Imaging: No new imaging today      Assessment   1. Arthritis of knee, left              Plan: Patient is interested in pursuing knee replacement at this time, she did inquire about possible robotic surgery, I provided contact information for Dr. Gusman as an option to discuss, also gave her contact information for Dr. Tinoco.  Patient will follow-up with me on an as-needed basis.

## 2024-01-21 NOTE — PROGRESS NOTES
Deepa Gusman MD   Adult Reconstruction and Joint Replacement Surgery  Phone: [phone]     Fax: [phone]     INITIAL CONSULTATION    Name: Cristiana Huddleston  : 1965  Date of Visit:  2024    CC: Left knee pain    Clinical History:  Cristiana Huddleston is a 58 y.o. female who presents with 10 years of LEFT knee pain. She was referred by Dr. Norris Smith.     Patient reports on and off pain for the 10 years that became significantly worse since she fell on her left knee in October. Patient has tried the following NSAIDs, Physical therapy, and Corticosteroid injections . Date of last steroid injection: 23 which worked for about 1 to 2 weeks. Prior to her past injection she reports that she had knee injections many years ago that provided relief. Patient does have pain at night that makes it difficult to fall asleep. Patient is able to walk 6 blocks. Patient is currently using cane as assistive device. She reports that she feels like her knee is buckling and needs the cane to avoid falling. Primarily complains of diffuse pain. Patient has difficulty with climbing stairs, descending stairs, and walking on unlevel surfaces . The pain is significantly impacting her ability to perform activities of daily living. Patient reports no longer able to do activities such as yoga.     Reports remote injury to left thigh from IM soccer. Believes this may explain the osteophyte/ossification/HO of superolateral knee.     Focused History  PMH: Reviewed and PE/DVT: None  PSH: Reviewed , Hip/Knee replacement: None, Hip/Knee surgery: Bilateral knee scopes. L knee scope in , Anesthesia complications: None, Spine surgery: None, Surgical infection: None, and Weight loss surgery: None  Meds: Reviewed, Current Anticoagulants: None, Weight loss medication: None, and Current Opioids: None  Allergies: Reviewed  and The patient reports no contraindications or allergies to cephalosporins, aspirin, NSAIDs or opioids,  except as noted above.  FH: No family history of any bleeding or clotting disorders.  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, and Preferred physical activities: Walking, yoga  Advent: no    PROMs   None     Past Medical History:   Diagnosis Date    Encounter for follow-up examination after completed treatment for conditions other than malignant neoplasm 09/05/2018    Hospital discharge follow-up    Encounter for gynecological examination (general) (routine) without abnormal findings 11/04/2019    Well female exam with routine gynecological exam    Herpes zoster without complication 10/11/2023    History of falling 09/05/2018    Status post fall    Obesity, unspecified 10/30/2019    Class 1 obesity with body mass index (BMI) of 34.0 to 34.9 in adult, unspecified obesity type, unspecified whether serious comorbidity present    Personal history of other diseases of the musculoskeletal system and connective tissue     History of arthritis    Personal history of other infectious and parasitic diseases     History of chickenpox     Documented in chart and reviewed.     Past Surgical History:   Procedure Laterality Date    APPENDECTOMY      DILATION AND CURETTAGE OF UTERUS      KNEE SURGERY Bilateral 09/05/2018    Knee Surgery; meniscal tears with repair    OTHER SURGICAL HISTORY  10/09/2020    Corneal lasik    OTHER SURGICAL HISTORY  12/27/2019    Dilation and curettage    OTHER SURGICAL HISTORY  12/27/2019    Breast augmentation    RETINAL DETACHMENT REPAIR W/ SCLERAL BUCKLE LE Right 12/19/2022       Allergies: She is allergic to chicken feathers allergenic extract.     Medications:  Current Outpatient Medications   Medication Instructions    diphenhydrAMINE-acetaminophen (Tylenol PM Extra Strength)  mg per tablet oral    melatonin 10 mg capsule oral    multivitamin (Daily Multi-Vitamin) tablet 1 tablet,  oral, Daily    traZODone (DESYREL) 75 mg, oral, Nightly       Family History   Problem Relation Name Age of Onset    Atrial fibrillation Mother      Melanoma Father       Documented in chart and reviewed.     Social History     Tobacco Use    Smoking status: Never    Smokeless tobacco: Never   Substance Use Topics    Alcohol use: Not Currently         Review of Systems: Review of systems completed with medical assistant intake. Please refer to this note.     Falls: The patient denies any recent falls or fall-related injuries.    Physical Exam:  BMI: 36, which is abnormal. Encouraged to lose weight and to follow up with PCP.    Constitutional: The patient is well-appearing and well groomed.     Neurological/Psychiatric: The patient is alert and oriented to person, place and time. The patient has a normal mood and affect.    Skin Examination: The skin over the right lower extremity, left lower extremity, right upper extremity, and left upper extremity is intact without any evidence of infection or rash.    Cardiovascular Examination: There are no varicosities and the skin is normal temperature, capillary refill normal, arterial pulses normal, no edema.    Lymphatic Examination: There is no lymphatic swelling or palpable lymph nodes present around the involved joint.    Neurological Examination: Bilateral lower extremities are grossly neurologically intact. Sensation normal, motor function normal.    Gait: The patient ambulates with an antalgic gait.     Right Hip Examination:  The skin is intact over the hip.    There is no tenderness over the greater trochanter.    Range of motion is full extension to 100 degrees of flexion.    The hip is stable without subluxation or dislocation.    The hip internally rotates to 15 degrees and externally rotates to 45 degrees.    There is no pain with hip motion.    Left Hip Examination:  The skin is intact over the hip.    There is no tenderness over the greater  "trochanter.    Range of motion is full extension to 100 degrees of flexion.    The hip is stable without subluxation or dislocation.    The hip internally rotates to 15 degrees and externally rotates to 45 degrees.    There is no pain with hip motion.    Left Knee Examination:  Examination of the left knee reveals the skin to be intact.    There is a moderate effusion in the knee.    The alignment of the knee is Varus.    This deformity is correctable.    There is tenderness to palpation over the joint line.    There is significant quadriceps atrophy.    Range of Motion: 10 to 110 degrees of flexion.    The knee is stable to varus-valgus stress and anterior-posterior stress.     There is severe grinding with range of motion.    There is severe patellofemoral crepitus.    Right Knee Examination:  Examination of the left knee reveals the skin to be intact.    There is a moderate effusion in the knee.    The alignment of the knee is Varus.    This deformity is correctable.    There is tenderness to palpation over the joint line.    There is significant quadriceps atrophy.    Range of Motion: 15 to 110 degrees of flexion.    The knee is stable to varus-valgus stress and anterior-posterior stress.     There is severe grinding with range of motion.    There is severe patellofemoral crepitus.    Prior Labs:   Lab Results   Component Value Date    WBC 7.4 11/03/2023    HGB 14.8 11/03/2023    HCT 45.8 11/03/2023    MCV 90 11/03/2023     11/03/2023      No results found for: \"INR\", \"PROTIME\"      Lab Results   Component Value Date    GLUCOSE 73 (L) 11/03/2023    CALCIUM 9.3 11/03/2023     11/03/2023    K 4.1 11/03/2023    CO2 24 11/03/2023     (H) 11/03/2023    BUN 13 11/03/2023    CREATININE 0.87 11/03/2023      No results found for: \"CKTOTAL\", \"CKMB\", \"CKMBINDEX\", \"TROPONINI\"   Lab Results   Component Value Date    HGBA1C 5.0 11/03/2023         No results found for: \"CRP\"   No results found for: \"SEDRATE\" "     Radiographs:  Radiographs were personally reviewed today. There is evidence of severe LEFT knee osteoarthritis with MEDIAL bone on bone apposition.    Impression:  58 y.o. female presents with severe LEFT knee osteoarthritis with bone on bone apposition. Patient has tried and failed appropriate conservative measures and now has limitation in ADL's.     Diagnosis:  Primary osteoarthritis of left knee    Localized osteoarthritis of left knee    Recommendations / Plan:    I have discussed the options in detail with the patient. We have discussed anti-inflammatory medication, activity modification, physical therapy, corticosteroid injections, viscosupplementation injections, and total knee replacement surgery.    The risks and benefits of all these treatment options have been discussed in detail. The patient has tried at least 3 months of the above conservative treatments and continues to have disabling pain, impaired activities of daily living and worsened quality of life. The patient is a candidate for a LEFT TOTAL knee replacement.     We discussed the hospital stay, postoperative rehab and follow up required as well as the potential risks of surgery including bleeding, infection, need for reoperation, failure of the operation to provide symptomatic relief, need for multiple revision surgeries in the setting of bleeding, wear, infection, instability, stiffness (meaning loss of flexion and/or loss of extension) requiring closed and/or open manipulation and/or revision, damage to major neurovascular structures, venous thrombolic disease, complications of regional and/or general anesthesia, as well as death. The patient also understands that a good result is not guaranteed and that poor outcomes can at times be unavoidable. Also discussed the potential use of robotics or computer navigation as well as associated risk of pin site infection, fracture and radiation. The patient may also have persistent and chronic pain  that could require further intervention.  The patient confirms their understanding of the risks as well as the benefits of surgery. I have explained that although knee replacement generally provides excellent pain relief and improvement in function, some patients continue to have a feeling of stiffness in the knee that can be permanent.     We have reviewed the typical recovery after surgery and have discussed the fact that full recovery can take up to 1 year or even longer.     The patient does not have any of the following contraindications to arthroplasty including active infection of the knee joint, systemic bacteremia, active skin infection or an open wound at the surgical site, neuropathic arthritis or severe, rapidly progressive neurological disease.     Patient will consider proceeding with LEFT TOTAL knee replacement. All questions were answered today. If the patient chooses to move forward with surgical scheduling, they will be enrolled in a preoperative arthroplasty teaching class and the pre-admission testing pathway. The patient was encouraged to contact their primary care physician to discuss fitness for surgery.     Social support after surgery: , mother nearby    Pain medication plan: standard    Additional preoperative considerations: Tom Left Knee     Diagnosis: M17.12 LEFT    Procedure: [id number] robotic assisted   Surgery Location: [place]    Equipment Requests: [facility]   Discharge: Same-day discharge     _____________  Deepa Gusman MD   [facility]     Approximately 45 minutes were spent on the following tasks:              Preparing for the patient              Reviewing medical records              Taking a patient history              Performing a physical exam              Reviewing treatment options with the patient              Explaining the risks, potential benefits, and alternative to surgery  Explaining the expected  rehabilitation after each treatment option  Explaining the potential long term expectations  Evaluating the diagnostic imaging   Templating pre-operative or current imaging  Performing surgical planning and booking     This office note was transcribed with dictation software.  Please excuse any typographical errors, program misunderstandings leading to inadvertent insertions or deletions of inappropriate wording, pronoun errors and other unintentional transcription errors not noticed on proof-reading.

## 2024-01-22 ENCOUNTER — OFFICE VISIT (OUTPATIENT)
Dept: ORTHOPEDIC SURGERY | Facility: CLINIC | Age: 59
End: 2024-01-22
Payer: COMMERCIAL

## 2024-01-22 ENCOUNTER — HOSPITAL ENCOUNTER (OUTPATIENT)
Dept: RADIOLOGY | Facility: CLINIC | Age: 59
Discharge: HOME | End: 2024-01-22
Payer: COMMERCIAL

## 2024-01-22 DIAGNOSIS — M17.12 PRIMARY OSTEOARTHRITIS OF LEFT KNEE: Primary | ICD-10-CM

## 2024-01-22 DIAGNOSIS — M17.12 PRIMARY OSTEOARTHRITIS OF LEFT KNEE: ICD-10-CM

## 2024-01-22 DIAGNOSIS — M17.12 LOCALIZED OSTEOARTHRITIS OF LEFT KNEE: ICD-10-CM

## 2024-01-22 PROCEDURE — 1036F TOBACCO NON-USER: CPT | Performed by: STUDENT IN AN ORGANIZED HEALTH CARE EDUCATION/TRAINING PROGRAM

## 2024-01-22 PROCEDURE — 99204 OFFICE O/P NEW MOD 45 MIN: CPT | Performed by: STUDENT IN AN ORGANIZED HEALTH CARE EDUCATION/TRAINING PROGRAM

## 2024-01-22 PROCEDURE — 73564 X-RAY EXAM KNEE 4 OR MORE: CPT | Mod: LT

## 2024-01-22 ASSESSMENT — PAIN SCALES - GENERAL: PAINLEVEL_OUTOF10: 9

## 2024-01-22 ASSESSMENT — PAIN DESCRIPTION - DESCRIPTORS: DESCRIPTORS: ACHING;SORE;TIGHTNESS

## 2024-01-22 ASSESSMENT — PAIN - FUNCTIONAL ASSESSMENT: PAIN_FUNCTIONAL_ASSESSMENT: 0-10

## 2024-01-23 ENCOUNTER — HOSPITAL ENCOUNTER (OUTPATIENT)
Dept: RADIOLOGY | Facility: HOSPITAL | Age: 59
Discharge: HOME | End: 2024-01-23
Payer: COMMERCIAL

## 2024-01-23 DIAGNOSIS — M17.12 LOCALIZED OSTEOARTHRITIS OF LEFT KNEE: ICD-10-CM

## 2024-01-23 DIAGNOSIS — M17.12 PRIMARY OSTEOARTHRITIS OF LEFT KNEE: ICD-10-CM

## 2024-01-23 PROCEDURE — 73700 CT LOWER EXTREMITY W/O DYE: CPT | Mod: LEFT SIDE | Performed by: RADIOLOGY

## 2024-01-23 PROCEDURE — 73700 CT LOWER EXTREMITY W/O DYE: CPT | Mod: LT

## 2024-01-30 PROBLEM — M17.12 UNILATERAL PRIMARY OSTEOARTHRITIS, LEFT KNEE: Status: ACTIVE | Noted: 2024-03-12

## 2024-02-09 ENCOUNTER — OFFICE VISIT (OUTPATIENT)
Dept: PRIMARY CARE | Facility: CLINIC | Age: 59
End: 2024-02-09
Payer: COMMERCIAL

## 2024-02-09 VITALS
DIASTOLIC BLOOD PRESSURE: 78 MMHG | WEIGHT: 269.6 LBS | SYSTOLIC BLOOD PRESSURE: 124 MMHG | BODY MASS INDEX: 38.68 KG/M2 | HEART RATE: 96 BPM

## 2024-02-09 DIAGNOSIS — L60.8 NAIL DEFORMITY: ICD-10-CM

## 2024-02-09 DIAGNOSIS — M17.11 PRIMARY LOCALIZED OSTEOARTHROSIS OF RIGHT LOWER LEG: ICD-10-CM

## 2024-02-09 DIAGNOSIS — Z72.820 POOR SLEEP: Primary | ICD-10-CM

## 2024-02-09 DIAGNOSIS — M17.12 UNILATERAL PRIMARY OSTEOARTHRITIS, LEFT KNEE: ICD-10-CM

## 2024-02-09 DIAGNOSIS — F41.9 ANXIETY: ICD-10-CM

## 2024-02-09 DIAGNOSIS — E66.01 MORBID OBESITY WITH BODY MASS INDEX (BMI) OF 40.0 OR HIGHER (MULTI): ICD-10-CM

## 2024-02-09 PROBLEM — S52.126D CLOSED NONDISPLACED FRACTURE OF HEAD OF RADIUS WITH ROUTINE HEALING: Status: RESOLVED | Noted: 2023-02-16 | Resolved: 2024-02-09

## 2024-02-09 PROCEDURE — 99214 OFFICE O/P EST MOD 30 MIN: CPT | Performed by: INTERNAL MEDICINE

## 2024-02-09 PROCEDURE — 1036F TOBACCO NON-USER: CPT | Performed by: INTERNAL MEDICINE

## 2024-02-09 RX ORDER — ACETAMINOPHEN 500 MG
TABLET ORAL EVERY 6 HOURS PRN
COMMUNITY
End: 2024-03-12 | Stop reason: HOSPADM

## 2024-02-09 RX ORDER — TRAZODONE HYDROCHLORIDE 50 MG/1
100 TABLET ORAL NIGHTLY
Qty: 180 TABLET | Refills: 1 | Status: SHIPPED | OUTPATIENT
Start: 2024-02-09 | End: 2024-03-11 | Stop reason: SDUPTHER

## 2024-02-09 ASSESSMENT — ENCOUNTER SYMPTOMS
PALPITATIONS: 0
COUGH: 0
DIZZINESS: 0
APPETITE CHANGE: 0
CONSTIPATION: 0
FATIGUE: 1
CHILLS: 0
SLEEP DISTURBANCE: 1
ABDOMINAL PAIN: 0
NERVOUS/ANXIOUS: 1
DIARRHEA: 0
CHEST TIGHTNESS: 0
SHORTNESS OF BREATH: 0
FEVER: 0
HEADACHES: 0
DIAPHORESIS: 0
LIGHT-HEADEDNESS: 0
DYSPHORIC MOOD: 0
UNEXPECTED WEIGHT CHANGE: 0
ACTIVITY CHANGE: 0
ARTHRALGIAS: 1
WHEEZING: 0

## 2024-02-09 NOTE — PROGRESS NOTES
Subjective   Patient ID: Cristiana Huddleston is a 58 y.o. female who presents for Follow-up (3m, weight check) and Weight Check.    HPI  Pt here in follow up to knee issues and weight.    She has since our last visit saw Dr. Smith (ortho) for her knee.  She did get injection initially but that only improved her knee for few weeks.  She was seen again on 1/12/24 to discuss further treatment options.  They discussed surgery-knee replacement.  She ended up seeing Dr. Gusman (ortho) on 1/22 to discuss the surgery further-noted to be scheduled for March 12, 2024.    She has not been able to exercise due to knee issues.  She doesn't wish to embark on weight loss until knee is replaced. She has now noted more pain in right hip/knee due to compensatory posture.  She is still in PT and goes twice a week.      She is using Trazodone which does help with some sleep-4-5 hours.  She will often note waking up due to knee pain.  She is now taking Tylenol extended release to help manage pain.  She has some anxiety/stress with the unknown.  She worries her procedure will not be successful and how that could impact her work and ability to take care of her  who has TBI.          Review of Systems   Constitutional:  Positive for fatigue. Negative for activity change, appetite change, chills, diaphoresis, fever and unexpected weight change.   Respiratory:  Negative for cough, chest tightness, shortness of breath and wheezing.    Cardiovascular:  Negative for chest pain, palpitations and leg swelling.   Gastrointestinal:  Negative for abdominal pain, constipation and diarrhea.   Genitourinary:  Negative for vaginal pain.   Musculoskeletal:  Positive for arthralgias.   Neurological:  Negative for dizziness, light-headedness and headaches.   Psychiatric/Behavioral:  Positive for sleep disturbance. Negative for dysphoric mood. The patient is nervous/anxious.        Objective   /78 (BP Location: Left arm, Patient Position:  Sitting)   Pulse 96   Wt 122 kg (269 lb 9.6 oz)   BMI 38.68 kg/m²    Physical Exam  Constitutional:       Appearance: Normal appearance. She is obese.   Cardiovascular:      Rate and Rhythm: Normal rate and regular rhythm.      Heart sounds: Normal heart sounds.   Pulmonary:      Effort: Pulmonary effort is normal.      Breath sounds: Normal breath sounds.   Abdominal:      General: Bowel sounds are normal.      Palpations: Abdomen is soft.   Musculoskeletal:         General: Swelling (bilateral knee swelling) present.      Right lower leg: No edema.      Left lower leg: No edema.   Lymphadenopathy:      Cervical: No cervical adenopathy.   Skin:     Comments: Dystrophic/thickened/discolored nails noted left foot-great toe, second toe and pinky toe    Neurological:      Mental Status: She is alert and oriented to person, place, and time.   Psychiatric:         Mood and Affect: Mood normal.         Assessment/Plan   Problem List Items Addressed This Visit       Morbid obesity with body mass index (BMI) of 40.0 or higher (CMS/HCC)     With patient getting left knee replacement and considering need for right knee to also be replaced will hold on weight loss meds/discussion  Want her to be fully healed and able to exercise when we work to reduce weight   For now can really focus on better diet-lean proteins/fish/veggies  Low carb/low sugar          Relevant Orders    Follow Up In Primary Care - Health Maintenance    Primary localized osteoarthrosis of right lower leg     Pt also has right knee OA and discussed with ortho need for this knee to be replaced as well   She will consider          Anxiety    Relevant Medications    traZODone (Desyrel) 50 mg tablet    Other Relevant Orders    Follow Up In Primary Care - Health Maintenance    Unilateral primary osteoarthritis, left knee     Pt scheduled to have TKR in March by ortho  Currently in PT two times a week          Poor sleep - Primary     On trazodone-still having  some sleep issues but mainly due to pain  Can increase dose to 100 mg if needed; rx written for 50 mg tablets where she has flexibility to take 1-2 tablets as needed  Sleep may improve once knee is replaced            Relevant Medications    traZODone (Desyrel) 50 mg tablet    Other Relevant Orders    Follow Up In Primary Care - Health Maintenance    Nail deformity     Recommend she see podiatry when she is ready due to nail deformities noted on left foot-some of which is due to old injuries +/- fungal infection

## 2024-02-09 NOTE — ASSESSMENT & PLAN NOTE
Recommend she see podiatry when she is ready due to nail deformities noted on left foot-some of which is due to old injuries +/- fungal infection

## 2024-02-09 NOTE — ASSESSMENT & PLAN NOTE
On trazodone-still having some sleep issues but mainly due to pain  Can increase dose to 100 mg if needed; rx written for 50 mg tablets where she has flexibility to take 1-2 tablets as needed  Sleep may improve once knee is replaced

## 2024-02-09 NOTE — ASSESSMENT & PLAN NOTE
Pt also has right knee OA and discussed with ortho need for this knee to be replaced as well   She will consider

## 2024-02-09 NOTE — ASSESSMENT & PLAN NOTE
With patient getting left knee replacement and considering need for right knee to also be replaced will hold on weight loss meds/discussion  Want her to be fully healed and able to exercise when we work to reduce weight   For now can really focus on better diet-lean proteins/fish/veggies  Low carb/low sugar

## 2024-02-09 NOTE — PATIENT INSTRUCTIONS
Get knee replaced when scheduled in March and follow up with PT/ortho as they direct  Once healed from this and able to move more we can address weight and discuss best treatment options  Continue Trazodone nightly-we increased dose to 2 tablets (100 mg) at bedtime   Use Tylenol ES before bed to help manage discomfort  Would see podiatry if you are concerned about two toe nails   Follow up 11/2024 for full physical

## 2024-02-12 NOTE — PROGRESS NOTES
Deepa Gusman MD   Adult Reconstruction and Joint Replacement Surgery  Phone: [phone]     Fax: [phone]     PREOPERATIVE CONSULTATION    Name: Cristiana Huddleston  : 1965  Date of Visit:  2024    CC: Left knee pain    Clinical History:  Cristiana Huddleston is a 58 y.o. female who presents for discussion of upcoming left total knee arthroplasty. The patient reports 10 years of LEFT knee pain. The patient has tried at least 3 months of conservative treatments, including Ice, NSAIDs, Activity modification, Physical therapy, Corticosteroid injections , and Xray, and continues to have disabling pain, impaired activities of daily living and worsened quality of life. They rate their pain as up to 10/10.    Patient does have pain at night. Patient is able to walk indoors only. Patient is currently using nothing as assistive device. Primarily complains of diffuse and medial pain. Patient has difficulty with climbing stairs, descending stairs, getting up from a chair, prolonged sitting , and walking on unlevel surfaces . The pain is significantly impacting her ability to perform activities of daily living. Patient reports no longer able to do activities such as walking without pain.     Focused History  PMH: Reviewed and PE/DVT: None  PSH: Reviewed , Hip/Knee replacement: None, Hip/Knee surgery: Bilateral knee scopes. L knee scope in , Anesthesia complications: None, Spine surgery: None, Surgical infection: None, and Weight loss surgery: None  Meds: Reviewed, Current Anticoagulants: None, Weight loss medication: None, and Current Opioids: None  Allergies: Reviewed  and The patient reports no contraindications or allergies to cephalosporins, aspirin, NSAIDs or opioids, except as noted above.  FH: No family history of any bleeding or clotting disorders.  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, and Preferred  physical activities: Walking, yoga  Baptism: no       PROMs   None     Past Medical History:   Diagnosis Date    Closed nondisplaced fracture of head of radius with routine healing 02/16/2023    Encounter for follow-up examination after completed treatment for conditions other than malignant neoplasm 09/05/2018    Hospital discharge follow-up    Encounter for gynecological examination (general) (routine) without abnormal findings 11/04/2019    Well female exam with routine gynecological exam    Herpes zoster without complication 10/11/2023    History of falling 09/05/2018    Status post fall    Obesity, unspecified 10/30/2019    Class 1 obesity with body mass index (BMI) of 34.0 to 34.9 in adult, unspecified obesity type, unspecified whether serious comorbidity present    Personal history of other diseases of the musculoskeletal system and connective tissue     History of arthritis    Personal history of other infectious and parasitic diseases     History of chickenpox     Documented in chart and reviewed.     Past Surgical History:   Procedure Laterality Date    APPENDECTOMY      DILATION AND CURETTAGE OF UTERUS      KNEE SURGERY Bilateral 09/05/2018    Knee Surgery; meniscal tears with repair    OTHER SURGICAL HISTORY  10/09/2020    Corneal lasik    OTHER SURGICAL HISTORY  12/27/2019    Dilation and curettage    OTHER SURGICAL HISTORY  12/27/2019    Breast augmentation    RETINAL DETACHMENT REPAIR W/ SCLERAL BUCKLE LE Right 12/19/2022       Allergies: She is allergic to chicken feathers allergenic extract.     Medications:  Current Outpatient Medications   Medication Instructions    acetaminophen (Tylenol) 500 mg tablet oral, Every 6 hours PRN    diphenhydrAMINE-acetaminophen (Tylenol PM Extra Strength)  mg per tablet oral    melatonin 10 mg capsule oral    multivitamin (Daily Multi-Vitamin) tablet 1 tablet, oral, Daily    potassium/magnesium (MAGNESIUM-POTASSIUM ORAL) oral    traZODone (DESYREL)  100 mg, oral, Nightly    vit C/E/Zn/coppr/lutein/zeaxan (PRESERVISION AREDS-2 ORAL) 2 tablets, oral, Daily       Family History   Problem Relation Name Age of Onset    Atrial fibrillation Mother      Melanoma Father       Documented in chart and reviewed.     Social History     Tobacco Use    Smoking status: Never    Smokeless tobacco: Never   Substance Use Topics    Alcohol use: Not Currently       Review of Systems: Review of systems completed with medical assistant intake. Please refer to this note.     Falls: The patient denies any recent falls or fall-related injuries but does endorse knee buckling which makes gait challenging at times.    Physical Exam:  BMI: Body mass index is 37.79 kg/m²., which is abnormal. Encouraged to lose weight and to follow up with PCP.    Constitutional: The patient is well-appearing and well groomed.     Neurological/Psychiatric: The patient is alert and oriented to person, place and time. The patient has a normal mood and affect.    Skin Examination: The skin over the right lower extremity, left lower extremity, right upper extremity, and left upper extremity is intact without any evidence of infection or rash.    Cardiovascular Examination: There are no varicosities and the skin is normal temperature, capillary refill normal, arterial pulses normal, no edema.    Lymphatic Examination: There is no lymphatic swelling or palpable lymph nodes present around the joint.    Neurological Examination: Bilateral lower extremities are grossly neurologically intact. Sensation normal, motor function normal.    Gait: The patient ambulates with an antalgic gait.     Lumbar spine:    No tenderness to palpation midline.    Negative straight leg raise bilaterally.    Right Hip Examination:  The skin is intact over the hip.    There is no tenderness over the greater trochanter.    Range of motion is full extension to 100 degrees of flexion.    The hip is stable without subluxation or  dislocation.    The hip internally rotates to 15 degrees and externally rotates to 45 degrees.    There is no pain with hip motion.    Left Hip Examination:  The skin is intact over the hip.    There is no tenderness over the greater trochanter.    Range of motion is full extension to 100 degrees of flexion.    The hip is stable without subluxation or dislocation.    The hip internally rotates to 15 degrees and externally rotates to 45 degrees.    There is no pain with hip motion.    Left Knee Examination:  Examination of the left knee reveals the skin to be intact.     There is a moderate effusion in the knee.     The alignment of the knee is Varus.     This deformity is correctable.     There is tenderness to palpation over the joint line.     There is significant quadriceps atrophy.     Range of Motion: 10 to 110 degrees of flexion.     The knee is stable to varus-valgus stress and anterior-posterior stress.      There is severe grinding with range of motion.     There is severe patellofemoral crepitus.     Right Knee Examination:  Examination of the left knee reveals the skin to be intact.     There is a moderate effusion in the knee.     The alignment of the knee is Varus.     This deformity is correctable.     There is tenderness to palpation over the joint line.     There is significant quadriceps atrophy.     Range of Motion: 15 to 110 degrees of flexion.     The knee is stable to varus-valgus stress and anterior-posterior stress.      There is severe grinding with range of motion.     There is severe patellofemoral crepitus.    Radiographs:  Radiographs were personally reviewed today. There is evidence of severe LEFT knee osteoarthritis with MEDIAL bone on bone apposition.    Impression:  58 y.o. female presents with severe LEFT knee osteoarthritis with bone on bone apposition. Patient has tried and failed appropriate conservative measures and now has limitation in ADL's.     Diagnosis:  Primary  osteoarthritis of left knee    Recommendations / Plan:    I have discussed the options in detail with the patient. We have discussed anti-inflammatory medication, activity modification, physical therapy, corticosteroid injections, viscosupplementation injections, partial knee replacement surgery and total knee replacement surgery.    The risks and benefits of all these treatment options have been discussed in detail. The patient has tried at least 3 months of the above conservative treatments and continues to have disabling pain, impaired activities of daily living and worsened quality of life. The patient is a candidate for a LEFT TOTAL knee replacement.     We discussed the hospital stay, postoperative rehab and follow up required as well as the potential risks of surgery including bleeding, need for homologous blood transfusion, infection, need for reoperation, failure of the operation to provide symptomatic relief, need for multiple revision surgeries in the setting of bleeding, wear, infection, instability, stiffness (meaning loss of flexion and/or loss of extension) requiring closed and/or open manipulation and/or revision, damage to major neurovascular structures, venous thrombolic disease, complications of regional and/or general anesthesia, as well as death. The patient also understands that a good result is not guaranteed and that poor outcomes can at times be unavoidable. The patient may also have persistent and chronic pain that could require further intervention.  The patient confirms their understanding of the risks as well as the benefits of surgery. I have explained that although knee replacement generally provides excellent pain relief and improvement in function, some patients continue to have a feeling of stiffness in the knee that can be permanent. We discussed the importance of physical therapy postoperatively to regain knee range of motion. Postoperative pain management strategies were  reviewed. We discussed that most patients discharge home in 0-1 days after their surgery depending on their medical health, physical function and social support.      We have reviewed the typical recovery after surgery and have discussed the fact that full recovery can take up to 1 year or even longer.     The patient does not have any of the following contraindications to arthroplasty including active infection of the knee joint, systemic bacteremia, active skin infection or an open wound at the surgical site, neuropathic arthritis or severe, rapidly progressive neurological disease.     Patient will consider proceeding with LEFT TOTAL knee replacement. All questions were answered today. If the patient chooses to move forward with surgical scheduling, they will be enrolled in a preoperative arthroplasty teaching class and the pre-admission testing pathway. The patient was encouraged to contact their primary care physician to discuss fitness for surgery. The patient has sought medical clearance from: Primary Care Physician: 2/9/24 . Pre-admission testing appointment date: 3/7/24.    Social support after surgery: , mother nearby    Pain medication plan: standard    Additional preoperative considerations: Tom Left Knee      Diagnosis: M17.12 LEFT    Procedure: 17802 robotic assisted   Surgery Location: [facility]    Equipment Requests: [facility]   Discharge: Same-day discharge   _____________  Deepa Gusman MD   [facility]     Approximately 45 minutes were spent on the following tasks:              Preparing for the patient              Reviewing medical records              Taking a patient history              Performing a physical exam              Reviewing treatment options with the patient              Explaining the risks, potential benefits, and alternative to surgery  Explaining the expected rehabilitation after each treatment option  Explaining the  potential long term expectations  Evaluating the diagnostic imaging   Templating pre-operative or current imaging  Performing surgical planning and booking     This office note was transcribed with dictation software.  Please excuse any typographical errors, program misunderstandings leading to inadvertent insertions or deletions of inappropriate wording, pronoun errors and other unintentional transcription errors not noticed on proof-reading.

## 2024-02-20 ENCOUNTER — TELEPHONE (OUTPATIENT)
Dept: PRIMARY CARE | Facility: CLINIC | Age: 59
End: 2024-02-20
Payer: COMMERCIAL

## 2024-02-22 ENCOUNTER — TELEPHONE (OUTPATIENT)
Dept: ORTHOPEDIC SURGERY | Facility: HOSPITAL | Age: 59
End: 2024-02-22
Payer: COMMERCIAL

## 2024-02-22 NOTE — TELEPHONE ENCOUNTER
Cristiana Huddleston  attended joint class on 2/22/2024 and Did not bring a care partner to the class. The preop survey was completed and the patient provided attestation that they understand the content reviewed and that they do have a care partner identified to assist with recovery. Patient was provided with a folder of materials and instructed to call orthopedic navigator with questions.

## 2024-02-25 ENCOUNTER — DOCUMENTATION (OUTPATIENT)
Dept: ORTHOPEDIC SURGERY | Facility: CLINIC | Age: 59
End: 2024-02-25
Payer: COMMERCIAL

## 2024-02-25 NOTE — PROGRESS NOTES
Deepa Gusman MD    Adult Reconstruction and Joint Replacement Surgery   Phone: [phone]     Fax: [phone]    Surgical Plan of Care       Patient: Cristiana Huddleston                                                 MRN: [id number]   Diagnosis: Left knee osteoarthritis       Disposition:   [] Inpatient    [] AMB    [x] AMB Same Day       Site:  [x] [facility]  [] [facility] (Time of day request: [] AM [] PM )      Case Complexity:  [x] 1  [] 2  [] 3        Laterality:  [x] LEFT  [] RIGHT  [] BILATERAL      Equipment:  [] Purdy Table  [] Large C-Arm  [x] Radha table  [] Flat-plate XR  [] Gerry flat     Special Requests:  Tom trays       PROCEDURE(S):        Total Knee (86421):      [] Depuy: Attune Cemented CR        [] Depuy Velys: Attune Affixium Cementless CR       [] Depuy Attune Cemented CR with Orthalign      [] Depuy Attune Affixium Cementless CR with Orthalign     [x] [place] Tom: Triathlon Knee Cemented CR      [] Radha Tom: Triathlon Knee Uncemented CR       [] Radha Tom: Triathlon Knee Cemented PS      [] [place] Tom: Triathlon Knee Cemented TS     [] Depuy Cones       [] [place] Cones      [] Hooker and Nephew Journey 2 with Orthalign      [] Krissy Ti-Nidium with Orthalign        PREOPERATIVE       Tranexamic Acid: [x] IV []Topical        Preoperative Antibiotics: []Hold until intraop cultures obtained []Per ID note        Heme: []Lovenox Bridge         PREADMISSION      LABS:      [x] Routine (CBC, BMP, T+S, INR, EKG)  [] CMP   [] UA   [] Urine cotinine test      [] ESR      []CRP   [x] Hgba1c   [] Albumin   [] Fructosamine   [] PFTs      [] Type/Cross 1 unit  [] D-Dimer  [] Vit D    [] Rheum Factor   [] CCP Abs          IMAGING: Please make sure all imaging is done by PAT visit.      [] Jess  [] BMC  [] CMC [] Outside:      [] CXR     [] XR Hip/Pelvis XR Knee         [] EOS Whole Body AP/Lat  [] EOS Hip to Ankle AP/Lat         [] EOS Pelvis AP/Lat (sitting)     [x] XR Hip to Ankle AP -  still needs  [] XR Pelvis Lat Sit/Stand        [] CT BENOIT MRI    [] CT BIOMET              [] MRA/CTA                                    [] Fluoro-guided aspiration [] BLE Venous ultrasound to r/o DVT      [] Shoulder       [] Elbow   [] C spine AP, flex/ext lat       [] Other:         CONSULTS:      [x] Medicine  [] Cardiac    [] Anesthesia  [] Weight Management      [] Pain Mgmt    [] Heme  [] Infectious Dis   [] Vascular   [] Plastic Surg  [] Pulm      [] Other:          BLOOD PRODUCTS:       FFP units:  []Factor   []Autologous       DME / REHAB      [] Hinged Knee Brace  [] Knee Immobilizer  [] Philippon  [] Other      [x] Polar Wave Ice Machine  [x] Polar Ice Packs       [] Preop PT Evaluation        [x] In-home PT  [x] Outpatient PT  [x] Home Health Care          HOLD PREOP MEDS:      [] Plavix (7 days)   [] Xarelto (72 hr) [] Coumadin (7 days)       [] Pradaxa  (5 days)        [] Eliquis (72 hrs)  [] Effient (7days)                        DMARDs:   [] Enbrel(2wks)   [] Cimzia (4wks)   [] Humira(2wks) [] Kevzara(4wks)      [] Remicade (4 wks)       [] Orencia(4wks)      [] Xeljanz (2 days)    [] Symponi (4wks)      [] Other:          POSTOP ANTICOAGULATION   [x] ASA 81 mg PO BID x 6 wks [] ASA 325mg PO BID x 6 wks             [] Xarelto      [] Eliquis          [] Coumadin        [] Lovenox             HISTORY/RESULTS      Patient Active Problem List   Diagnosis    Elevated alkaline phosphatase level    Knee pain    Low back pain    Morbid obesity with body mass index (BMI) of 40.0 or higher (CMS/HCC)    Primary localized osteoarthrosis of right lower leg    Anxiety    Unilateral primary osteoarthritis, left knee    Poor sleep    Nail deformity         Current Outpatient Medications   Medication Instructions    acetaminophen (Tylenol) 500 mg tablet oral, Every 6 hours PRN    diphenhydrAMINE-acetaminophen (Tylenol PM Extra Strength)  mg per tablet oral    melatonin 10 mg capsule oral    multivitamin  "(Daily Multi-Vitamin) tablet 1 tablet, oral, Daily    potassium/magnesium (MAGNESIUM-POTASSIUM ORAL) oral    traZODone (DESYREL) 100 mg, oral, Nightly    vit C/E/Zn/coppr/lutein/zeaxan (PRESERVISION AREDS-2 ORAL) 2 tablets, oral, Daily         Allergies   Allergen Reactions    Chicken Feathers Allergenic Extract Hives     Chicken Feathers: only where skin has been in contact with feathers         Lab Results   Component Value Date    WBC 7.4 11/03/2023    HGB 14.8 11/03/2023    HCT 45.8 11/03/2023    MCV 90 11/03/2023     11/03/2023      No results found for: \"INR\", \"PROTIME\"      Lab Results   Component Value Date    GLUCOSE 73 (L) 11/03/2023    CALCIUM 9.3 11/03/2023     11/03/2023    K 4.1 11/03/2023    CO2 24 11/03/2023     (H) 11/03/2023    BUN 13 11/03/2023    CREATININE 0.87 11/03/2023      No results found for: \"CKTOTAL\", \"CKMB\", \"CKMBINDEX\", \"TROPONINI\"   Lab Results   Component Value Date    HGBA1C 5.0 11/03/2023         No results found for: \"CRP\"   No results found for: \"SEDRATE\"        " Estimated Blood Loss (Cc): minimal

## 2024-02-25 NOTE — PROGRESS NOTES
Deepa Gusman MD    Adult Reconstruction and Joint Replacement Surgery   Phone: [phone]     Fax: [phone]    Surgical Plan of Care       Patient: Cristiana Huddleston                                                 MRN: [id number]   Diagnosis: Left knee osteoarthritis       Disposition:   [] Inpatient    [] AMB    [x] AMB Same Day       Site:  [x] [place]  [] [facility] (Time of day request: [] AM [] PM )      Case Complexity:  [x] 1  [] 2  [] 3        Laterality:  [x] LEFT  [] RIGHT  [] BILATERAL      Equipment:  [] [place] Table  [] Large C-Arm  [x] Radha table  [] Flat-plate XR  [] Gerry flat     Special Requests:       PROCEDURE(S):        Total Knee (92042):      [] Depuy: Attune Cemented CR        [] Depuy Velys: Attune Affixium Cementless CR       [] Depuy Attune Cemented CR with Orthalign      [] Depuy Attune Affixium Cementless CR with Orthalign     [x] [place] Tom: Triathlon Knee Cemented CR   CS bearing      [] [place] Tom: Triathlon Knee Uncemented CR       [] Radha Tom: Triathlon Knee Cemented PS      [] Radha Tom: Triathlon Knee Cemented TS     [] Depuy Cones       [] Radha Cones      [] Hooker and Nephew Journey 2 with Orthalign      [] Krissy Ti-Nidium with Orthalign           PREOPERATIVE       Tranexamic Acid: [x] IV []Topical        Preoperative Antibiotics: []Hold until intraop cultures obtained []Per ID note        Heme: []Lovenox Bridge         PREADMISSION      LABS:      [x] Routine (CBC, BMP, T+S, INR, EKG)  [] CMP   [] UA   [] Urine cotinine test      [] ESR      []CRP   [x] Hgba1c   [] Albumin   [] Fructosamine   [] PFTs      [] Type/Cross 1 unit  [] D-Dimer  [] Vit D    [] Rheum Factor   [] CCP Abs          IMAGING: Please make sure all imaging is done by PAT visit.      [] Jess  [] BMC  [] CMC [] Outside:      [] CXR     [] XR Hip/Pelvis XR Knee         [] EOS Whole Body AP/Lat  [] EOS Hip to Ankle AP/Lat         [] EOS Pelvis AP/Lat (sitting)     [x] XR Hip to Ankle AP  - still needed  [] XR Pelvis Lat Sit/Stand        [] CT BENOIT MRI    [] CT BIOMET              [] MRA/CTA                                    [] Fluoro-guided aspiration [] BLE Venous ultrasound to r/o DVT      [] Shoulder       [] Elbow   [] C spine AP, flex/ext lat       [] Other:         CONSULTS:      [x] Medicine  [] Cardiac    [] Anesthesia  [] Weight Management      [] Pain Mgmt    [] Heme  [] Infectious Dis   [] Vascular   [] Plastic Surg  [] Pulm      [] Other:          BLOOD PRODUCTS:       FFP units:  []Factor   []Autologous       DME / REHAB      [] Hinged Knee Brace  [] Knee Immobilizer  [] Philippon  [] Other      [x] Polar Wave Ice Machine  [x] Polar Ice Packs       [] Preop PT Evaluation        [x] In-home PT  [x] Outpatient PT  [x] Home Health Care          HOLD PREOP MEDS:      [] Plavix (7 days)   [] Xarelto (72 hr) [] Coumadin (7 days)       [] Pradaxa  (5 days)        [] Eliquis (72 hrs)  [] Effient (7days)                        DMARDs:   [] Enbrel(2wks)   [] Cimzia (4wks)   [] Humira(2wks) [] Kevzara(4wks)      [] Remicade (4 wks)       [] Orencia(4wks)      [] Xeljanz (2 days)    [] Symponi (4wks)      [] Other:          POSTOP ANTICOAGULATION   [x] ASA 81 mg PO BID x 6 wks [] ASA 325mg PO BID x 6 wks             [] Xarelto      [] Eliquis          [] Coumadin        [] Lovenox             HISTORY/RESULTS      Patient Active Problem List   Diagnosis    Elevated alkaline phosphatase level    Knee pain    Low back pain    Morbid obesity with body mass index (BMI) of 40.0 or higher (CMS/HCC)    Primary localized osteoarthrosis of right lower leg    Anxiety    Unilateral primary osteoarthritis, left knee    Poor sleep    Nail deformity         Current Outpatient Medications   Medication Instructions    acetaminophen (Tylenol) 500 mg tablet oral, Every 6 hours PRN    diphenhydrAMINE-acetaminophen (Tylenol PM Extra Strength)  mg per tablet oral    melatonin 10 mg capsule oral    multivitamin  "(Daily Multi-Vitamin) tablet 1 tablet, oral, Daily    potassium/magnesium (MAGNESIUM-POTASSIUM ORAL) oral    traZODone (DESYREL) 100 mg, oral, Nightly    vit C/E/Zn/coppr/lutein/zeaxan (PRESERVISION AREDS-2 ORAL) 2 tablets, oral, Daily         Allergies   Allergen Reactions    Chicken Feathers Allergenic Extract Hives     Chicken Feathers: only where skin has been in contact with feathers         Lab Results   Component Value Date    WBC 7.4 11/03/2023    HGB 14.8 11/03/2023    HCT 45.8 11/03/2023    MCV 90 11/03/2023     11/03/2023      No results found for: \"INR\", \"PROTIME\"      Lab Results   Component Value Date    GLUCOSE 73 (L) 11/03/2023    CALCIUM 9.3 11/03/2023     11/03/2023    K 4.1 11/03/2023    CO2 24 11/03/2023     (H) 11/03/2023    BUN 13 11/03/2023    CREATININE 0.87 11/03/2023      No results found for: \"CKTOTAL\", \"CKMB\", \"CKMBINDEX\", \"TROPONINI\"   Lab Results   Component Value Date    HGBA1C 5.0 11/03/2023         No results found for: \"CRP\"   No results found for: \"SEDRATE\"        "

## 2024-02-26 ENCOUNTER — HOSPITAL ENCOUNTER (OUTPATIENT)
Dept: RADIOLOGY | Facility: CLINIC | Age: 59
Discharge: HOME | End: 2024-02-26
Payer: COMMERCIAL

## 2024-02-26 ENCOUNTER — OFFICE VISIT (OUTPATIENT)
Dept: ORTHOPEDIC SURGERY | Facility: CLINIC | Age: 59
End: 2024-02-26
Payer: COMMERCIAL

## 2024-02-26 VITALS — WEIGHT: 263.4 LBS | HEIGHT: 70 IN | BODY MASS INDEX: 37.71 KG/M2

## 2024-02-26 DIAGNOSIS — M17.12 PRIMARY OSTEOARTHRITIS OF LEFT KNEE: ICD-10-CM

## 2024-02-26 DIAGNOSIS — M17.12 PRIMARY OSTEOARTHRITIS OF LEFT KNEE: Primary | ICD-10-CM

## 2024-02-26 PROCEDURE — 1036F TOBACCO NON-USER: CPT | Performed by: STUDENT IN AN ORGANIZED HEALTH CARE EDUCATION/TRAINING PROGRAM

## 2024-02-26 PROCEDURE — 77073 BONE LENGTH STUDIES: CPT | Performed by: RADIOLOGY

## 2024-02-26 PROCEDURE — 77073 BONE LENGTH STUDIES: CPT

## 2024-02-26 PROCEDURE — 99214 OFFICE O/P EST MOD 30 MIN: CPT | Performed by: STUDENT IN AN ORGANIZED HEALTH CARE EDUCATION/TRAINING PROGRAM

## 2024-02-26 ASSESSMENT — PAIN SCALES - GENERAL: PAINLEVEL_OUTOF10: 7

## 2024-02-26 ASSESSMENT — PAIN - FUNCTIONAL ASSESSMENT: PAIN_FUNCTIONAL_ASSESSMENT: 0-10

## 2024-02-26 ASSESSMENT — PAIN DESCRIPTION - DESCRIPTORS: DESCRIPTORS: ACHING;DULL

## 2024-02-28 ENCOUNTER — TELEMEDICINE CLINICAL SUPPORT (OUTPATIENT)
Dept: PREADMISSION TESTING | Facility: HOSPITAL | Age: 59
End: 2024-02-28
Payer: COMMERCIAL

## 2024-02-28 VITALS — BODY MASS INDEX: 36.94 KG/M2 | WEIGHT: 258 LBS | HEIGHT: 70 IN

## 2024-02-28 NOTE — PREPROCEDURE INSTRUCTIONS
Current Medications   Medication Instructions    acetaminophen (Tylenol) 500 mg tablet Continue until night before surgery    diphenhydrAMINE-acetaminophen (Tylenol PM Extra Strength)  mg per tablet Continue until night before surgery    melatonin 10 mg capsule Stop 7 days before surgery    multivitamin (Daily Multi-Vitamin) tablet Stop 7 days before surgery    potassium/magnesium (MAGNESIUM-POTASSIUM ORAL) Stop 7 days before surgery    traZODone (Desyrel) 50 mg tablet Continue until night before surgery    vit C/E/Zn/coppr/lutein/zeaxan (PRESERVISION AREDS-2 ORAL) Stop 7 days before surgery                       NPO Instructions:    Do not eat any food after midnight the night before your surgery/procedure.    Additional Instructions:     Seven/Six Days before Surgery:  Review your medication instructions, stop indicated medications  Five Days before Surgery:  Review your medication instructions, stop indicated medications  Three Days before Surgery:  Review your medication instructions, stop indicated medications  The Day before Surgery:  Review your medication instructions, stop indicated medications  You will be contacted regarding the time of your arrival to facility and surgery time  Do not eat any food after Midnight  Day of Surgery:  Review your medication instructions, take indicated medications  Wear  comfortable loose fitting clothing  Do not use moisturizers, creams, lotions or perfume  All jewelry and valuables should be left at home  PAT DISCHARGE INSTRUCTIONS    Please call the Same Day Surgery (SDS) Department of the hospital where your procedure will be performed between 2:00- 3:30 PM the day before your surgery. If you are scheduled on a Monday, or a Tuesday following a Monday holiday, you will need to call on the last business day prior to your surgery.    [facility]  [address] [place] [address]  [phone]    Please let your surgeon know if:       You develop any open sores, shingles, burning or painful urination as these may increase your risk of an infection.   You no longer wish to have the surgery.   Any other personal circumstances change that may lead to the need to cancel or defer this surgery-such as being sick or getting admitted to any hospital within one week of your planned procedure.    Your contact details change, such as a change of address or phone number.    Starting now:     Please DO NOT drink alcohol or smoke for 24 hours before surgery. It is well known that quitting smoking can make a huge difference to your health and recovery from surgery. The longer you abstain from smoking, the better your chances of a healthy recovery. If you need help with quitting, call 2-800QUIT-NOW to be connected to a trained counselor who will discuss the best methods to help you quit.     Before your surgery:    Please stop all supplements 7 days prior to surgery. Or as directed by your surgeon.   Please stop taking NSAID pain medicine such as Advil and Motrin 7 days before surgery.    If you develop any fever, cough, cold, rashes, cuts, scratches, scrapes, urinary symptoms or infection anywhere on your body (including teeth and gums) prior to surgery, please call your surgeon’s office as soon as possible. This may require treatment to reduce the chance of cancellation on the day of surgery.    The day before your surgery:   DIET- Do not eat any food after MIDNIGHT.   Get a good night’s rest.  Use the special soap for bathing if you have been instructed to use one.    Scheduled surgery times may change and you will be notified if this occurs - please check your personal voicemail for any updates.     On the morning of surgery:   Wear comfortable, loose fitting clothes which open in the front. Please do not wear moisturizers, creams, lotions, makeup or perfume.    Please bring with you to surgery:   Photo ID and insurance card   Current list of medicines  and allergies   Pacemaker/ Defibrillator/Heart stent cards   CPAP machine and mask    Slings/ splints/ crutches   A copy of your complete advanced directive/DHPOA.    Please do NOT bring with you to surgery:   All jewelry and valuables should be left at home.   Prosthetic devices such as contact lenses, hearing aids, dentures, eyelash extensions, hairpins and body piercings must be removed prior to going in to the surgical suite.    After outpatient surgery:   A responsible adult MUST accompany you at the time of discharge and stay with you for 24 hours after your surgery. You may NOT drive yourself home after surgery.    Do not drive, operate machinery, make critical decisions or do activities that require co-ordination or balance until after a night’s sleep.   Do not drink alcoholic beverages for 24 hours.   Instructions for resuming your medications will be provided by your surgeon.    CALL YOUR DOCTOR AFTER SURGERY IF YOU HAVE:     Chills and/or a fever of 101° F or higher.    Redness, swelling, pus or drainage from your surgical wound or a bad smell from the wound.    Lightheadedness, fainting or confusion.    Persistent vomiting (throwing up) and are not able to eat or drink for 12 hours.    Three or more loose, watery bowel movements in 24 hours (diarrhea).   Difficulty or pain while urinating( after non-urological surgery)    Pain and swelling in your legs, especially if it is only on one side.    Difficulty breathing or are breathing faster than normal.    Any new concerning symptoms.      Reviewed pre-op instructions with patient, states understanding and denies further questions at this time.    If you have not received a call regarding your arrival time for surgery by 2pm on the day before surgery, you can call [phone].    Take Care

## 2024-03-05 ENCOUNTER — APPOINTMENT (OUTPATIENT)
Dept: PREADMISSION TESTING | Facility: HOSPITAL | Age: 59
End: 2024-03-05
Payer: COMMERCIAL

## 2024-03-05 ENCOUNTER — TELEPHONE (OUTPATIENT)
Dept: ORTHOPEDIC SURGERY | Facility: HOSPITAL | Age: 59
End: 2024-03-05
Payer: COMMERCIAL

## 2024-03-05 NOTE — TELEPHONE ENCOUNTER
Thank you for taking my call today.  All questions were answered at the time of the call, but please feel free to reach out to me via [employer] or phone, [phone], with any new questions or concerns.       We confirmed that you opted to enroll in our [facility] program so your discharge prescriptions will be available to take home at the time of discharge.  Please bring any prescription insurance coverage with you on the morning of surgery so that we can enter the information into our system.     We confirmed that your plan would be to Discharge Home same day (Rapid Recovery).    We confirmed that you have DME needed for recovery.     Use the provided body wash for 4 days before surgery and complete a 5th shower on the morning of surgery, this includes your body and hair.  Follow the directions as provided during preadmission testing.  The mouth wash will be used the night before and the morning of surgery.       As a reminder, if you do not hear from our team, please call [phone] between 2pm and 3pm the business day before your surgery to confirm your arrival time and details.        Please don't hesitate to reach out with additional questions or concerns.    Rosey Mcgee MBA, BSN, RN-BC  PAPA MartinezN, RN  Orthopedic Program Navigators  [facility]  [phone]

## 2024-03-07 ENCOUNTER — HOME HEALTH ADMISSION (OUTPATIENT)
Dept: HOME HEALTH SERVICES | Facility: HOME HEALTH | Age: 59
End: 2024-03-07
Payer: COMMERCIAL

## 2024-03-07 ENCOUNTER — PRE-ADMISSION TESTING (OUTPATIENT)
Dept: PREADMISSION TESTING | Facility: HOSPITAL | Age: 59
End: 2024-03-07
Payer: COMMERCIAL

## 2024-03-07 VITALS
WEIGHT: 269.51 LBS | BODY MASS INDEX: 38.58 KG/M2 | OXYGEN SATURATION: 95 % | TEMPERATURE: 98.1 F | HEART RATE: 70 BPM | RESPIRATION RATE: 16 BRPM | HEIGHT: 70 IN | SYSTOLIC BLOOD PRESSURE: 133 MMHG | DIASTOLIC BLOOD PRESSURE: 83 MMHG

## 2024-03-07 DIAGNOSIS — Z01.818 PREOPERATIVE TESTING: Primary | ICD-10-CM

## 2024-03-07 DIAGNOSIS — M17.12 UNILATERAL PRIMARY OSTEOARTHRITIS, LEFT KNEE: ICD-10-CM

## 2024-03-07 LAB
ABO GROUP (TYPE) IN BLOOD: NORMAL
ABO GROUP (TYPE) IN BLOOD: NORMAL
ANION GAP SERPL CALC-SCNC: 12 MMOL/L (ref 10–20)
ANTIBODY SCREEN: NORMAL
BASOPHILS # BLD AUTO: 0.04 X10*3/UL (ref 0–0.1)
BASOPHILS NFR BLD AUTO: 0.6 %
BUN SERPL-MCNC: 15 MG/DL (ref 6–23)
CALCIUM SERPL-MCNC: 9.3 MG/DL (ref 8.6–10.3)
CHLORIDE SERPL-SCNC: 104 MMOL/L (ref 98–107)
CO2 SERPL-SCNC: 27 MMOL/L (ref 21–32)
CREAT SERPL-MCNC: 0.83 MG/DL (ref 0.5–1.05)
EGFRCR SERPLBLD CKD-EPI 2021: 81 ML/MIN/1.73M*2
EOSINOPHIL # BLD AUTO: 0.2 X10*3/UL (ref 0–0.7)
EOSINOPHIL NFR BLD AUTO: 2.9 %
ERYTHROCYTE [DISTWIDTH] IN BLOOD BY AUTOMATED COUNT: 14.2 % (ref 11.5–14.5)
EST. AVERAGE GLUCOSE BLD GHB EST-MCNC: 103 MG/DL
GLUCOSE SERPL-MCNC: 94 MG/DL (ref 74–99)
HBA1C MFR BLD: 5.2 %
HCT VFR BLD AUTO: 42.6 % (ref 36–46)
HGB BLD-MCNC: 13.6 G/DL (ref 12–16)
IMM GRANULOCYTES # BLD AUTO: 0.01 X10*3/UL (ref 0–0.7)
IMM GRANULOCYTES NFR BLD AUTO: 0.1 % (ref 0–0.9)
LYMPHOCYTES # BLD AUTO: 2.06 X10*3/UL (ref 1.2–4.8)
LYMPHOCYTES NFR BLD AUTO: 30.2 %
MCH RBC QN AUTO: 27.4 PG (ref 26–34)
MCHC RBC AUTO-ENTMCNC: 31.9 G/DL (ref 32–36)
MCV RBC AUTO: 86 FL (ref 80–100)
MONOCYTES # BLD AUTO: 0.52 X10*3/UL (ref 0.1–1)
MONOCYTES NFR BLD AUTO: 7.6 %
NEUTROPHILS # BLD AUTO: 3.99 X10*3/UL (ref 1.2–7.7)
NEUTROPHILS NFR BLD AUTO: 58.6 %
NRBC BLD-RTO: ABNORMAL /100{WBCS}
PLATELET # BLD AUTO: 256 X10*3/UL (ref 150–450)
POTASSIUM SERPL-SCNC: 4.1 MMOL/L (ref 3.5–5.3)
RBC # BLD AUTO: 4.96 X10*6/UL (ref 4–5.2)
RH FACTOR (ANTIGEN D): NORMAL
RH FACTOR (ANTIGEN D): NORMAL
SODIUM SERPL-SCNC: 139 MMOL/L (ref 136–145)
WBC # BLD AUTO: 6.8 X10*3/UL (ref 4.4–11.3)

## 2024-03-07 PROCEDURE — 80048 BASIC METABOLIC PNL TOTAL CA: CPT

## 2024-03-07 PROCEDURE — 36415 COLL VENOUS BLD VENIPUNCTURE: CPT

## 2024-03-07 PROCEDURE — 85025 COMPLETE CBC W/AUTO DIFF WBC: CPT

## 2024-03-07 PROCEDURE — 86900 BLOOD TYPING SEROLOGIC ABO: CPT

## 2024-03-07 PROCEDURE — 86850 RBC ANTIBODY SCREEN: CPT

## 2024-03-07 PROCEDURE — 99204 OFFICE O/P NEW MOD 45 MIN: CPT | Performed by: NURSE PRACTITIONER

## 2024-03-07 PROCEDURE — 93005 ELECTROCARDIOGRAM TRACING: CPT

## 2024-03-07 PROCEDURE — 86901 BLOOD TYPING SEROLOGIC RH(D): CPT

## 2024-03-07 PROCEDURE — 87081 CULTURE SCREEN ONLY: CPT

## 2024-03-07 PROCEDURE — 83036 HEMOGLOBIN GLYCOSYLATED A1C: CPT

## 2024-03-07 RX ORDER — DOCUSATE SODIUM 100 MG/1
100 CAPSULE, LIQUID FILLED ORAL 2 TIMES DAILY
Qty: 30 CAPSULE | Refills: 0 | Status: SHIPPED | OUTPATIENT
Start: 2024-03-07 | End: 2024-03-27

## 2024-03-07 RX ORDER — TRAMADOL HYDROCHLORIDE 50 MG/1
50-100 TABLET ORAL EVERY 6 HOURS PRN
Qty: 40 TABLET | Refills: 0 | Status: SHIPPED | OUTPATIENT
Start: 2024-03-07 | End: 2024-03-17

## 2024-03-07 RX ORDER — MELOXICAM 15 MG/1
15 TABLET ORAL DAILY
Qty: 60 TABLET | Refills: 0 | Status: SHIPPED | OUTPATIENT
Start: 2024-03-07 | End: 2024-05-06 | Stop reason: SDUPTHER

## 2024-03-07 RX ORDER — OXYCODONE HYDROCHLORIDE 5 MG/1
5-10 TABLET ORAL EVERY 6 HOURS PRN
Qty: 40 TABLET | Refills: 0 | Status: SHIPPED | OUTPATIENT
Start: 2024-03-07 | End: 2024-03-17

## 2024-03-07 RX ORDER — CEFADROXIL 500 MG/1
500 CAPSULE ORAL 2 TIMES DAILY
Qty: 14 CAPSULE | Refills: 0 | Status: SHIPPED | OUTPATIENT
Start: 2024-03-07 | End: 2024-03-19

## 2024-03-07 RX ORDER — ACETAMINOPHEN 500 MG
1000 TABLET ORAL EVERY 8 HOURS
Qty: 360 TABLET | Refills: 0 | Status: SHIPPED | OUTPATIENT
Start: 2024-03-07 | End: 2024-05-06 | Stop reason: SDUPTHER

## 2024-03-07 RX ORDER — NAPROXEN SODIUM 220 MG/1
81 TABLET, FILM COATED ORAL 2 TIMES DAILY
Qty: 84 TABLET | Refills: 0 | Status: SHIPPED | OUTPATIENT
Start: 2024-03-07 | End: 2024-04-11 | Stop reason: SDUPTHER

## 2024-03-07 RX ORDER — CHLORHEXIDINE GLUCONATE ORAL RINSE 1.2 MG/ML
15 SOLUTION DENTAL AS NEEDED
Qty: 30 ML | Refills: 0 | Status: SHIPPED | OUTPATIENT
Start: 2024-03-07 | End: 2024-04-12 | Stop reason: WASHOUT

## 2024-03-07 RX ORDER — ONDANSETRON 4 MG/1
4 TABLET, FILM COATED ORAL EVERY 8 HOURS PRN
Qty: 20 TABLET | Refills: 0 | Status: SHIPPED | OUTPATIENT
Start: 2024-03-07 | End: 2024-04-12 | Stop reason: WASHOUT

## 2024-03-07 RX ORDER — SENNOSIDES 8.6 MG/1
1 TABLET ORAL DAILY
Qty: 15 TABLET | Refills: 0 | Status: SHIPPED | OUTPATIENT
Start: 2024-03-07 | End: 2024-03-27

## 2024-03-07 RX ORDER — PANTOPRAZOLE SODIUM 40 MG/1
40 TABLET, DELAYED RELEASE ORAL
Qty: 30 TABLET | Refills: 0 | Status: SHIPPED | OUTPATIENT
Start: 2024-03-07 | End: 2024-04-08 | Stop reason: SDUPTHER

## 2024-03-07 ASSESSMENT — PAIN - FUNCTIONAL ASSESSMENT: PAIN_FUNCTIONAL_ASSESSMENT: 0-10

## 2024-03-07 ASSESSMENT — PAIN DESCRIPTION - DESCRIPTORS: DESCRIPTORS: ACHING;SHARP

## 2024-03-07 ASSESSMENT — PAIN SCALES - GENERAL: PAINLEVEL_OUTOF10: 7

## 2024-03-07 NOTE — DISCHARGE INSTRUCTIONS
Deepa Gusman MD   Adult Reconstruction and Joint Replacement Surgery  Office: [phone]     Fax: [phone]       Total Knee Arthroplasty   Postoperative Instructions    CONTACT INFORMATION  Deepa Gusman MD  Joint Replacement Surgeon   [facility]      [phone]     Rosey Mcgee MBA, BSN, RN-BC  Ortho Coordinator [facility]  [phone]    Jorge Luna RN, BSN  Ortho Coordinator [facility]  [phone]    Nena Salazar BSN-BC  Ortho Nurse Navigator  [phone]    DRIVING AFTER SURGERY   Please discuss post-surgical, long-distance travel with your surgeon. You may not drive until you are off narcotics and cleared by your surgical team.     WOUND CARE   A padded wrap (ace wrap) was placed on your knee on the day of surgery. You may remove this on the 2nd day after surgery.     A waterproof dressing was placed over your surgical site. You may shower over this dressing after surgery and pat it dry. Please do not scrub, soak, or submerge your surgical site for at least three weeks after surgery to allow your incision to heal. Avoid using creams and ointments around your surgical site while it is healing.    Your dressing will be removed on post-operative day 7 by your physical therapist. You may leave the incision open to air after the bandage has been removed. Please do not submerge your incision in a hot tub/pool/lake/etc. until cleared by your surgeon. Please contact the office if there are any concerns with your wound.     Pain, swelling, and bruising are normal after total knee replacement surgery. You may alleviate these symptoms by following the post-operative pain regimen that was prescribed, icing your surgical site multiple times a day, and elevating your extremity throughout the day.     ACTIVITY AND PRECAUTIONS  Please follow the post-operative activity precautions that were described to you by your surgical team and physical therapists (if  applicable).    Weightbearing restriction: weightbearing as tolerated.     DISCHARGE MEDICATIONS  Pain  Please take the pain medications that were prescribed to you according to the prescribed schedule. You may not operate a motor vehicle while taking narcotic medications (e.g. Oxycodone, Tramadol).     Please take Tylenol and NSAIDs (if you are able) on a schedule as discussed in clinic. Please take the prescribed Pantoprazole to protect your stomach from ulceration after surgery while taking NSAIDs.     Please wean off the narcotic pain medications as soon as you are able.     Blood-Thinners  Please take the blood thinning medications that were prescribed according to the instructions from your surgeon. These are typically continued for 6 weeks postoperatively. This schedule may differ if you were already on blood-thinning medication prior to your surgery.     Nausea  You may feel nauseous after surgery due to the anesthetics or pain medications you are taking. You may take anti-nausea medication (e.g. Ondansetron) to help with these symptoms.     Stool Softeners   Please take the stool softeners that were prescribed at discharge (e.g. Colace, Senna) while you are on narcotic pain medications to minimize your risk of constipation.    Home Medications   You may restart your home medications at time of discharge according to your discharge instructions.    PHYSICAL THERAPY AND OCCUPATIONAL THERAPY   In-home physical therapy and occupational therapy will start within a few days of your discharge to home. You will transition to outpatient physical therapy around 2-3 weeks after your surgery.     FOLLOW-UP  You will see your surgical team around 2 weeks after surgery for a wound check (unless otherwise arranged) and between 4-6 weeks after surgery for X-rays and clinical evaluation.    CALL YOUR SURGEON IF YOU HAVE:   Drainage that is not contained by your surgical dressing.  Redness, pain or swelling in your calf.    Severe pain that is not controlled by the pain regimen prescribed.   Fever >101 Fahrenheit presents for at least 48 hours or other signs of infection (wound drainage, redness around your surgical incision, increased joint pain)  Go to the emergency department or call 911 if you experience chest pain, shortness of breath or other emergent symptoms. Do not call your surgeon first.     ANTIBIOTIC PROPHYLAXIS AFTER JOINT REPLACEMENT SURGERY   Although it is a rare occurrence, an artificial joint can become infected by the bloodstream carrying infection from another part of the body to the replaced joint.  Therefore, it is important that any bacterial infection be treated promptly. If you are unsure of whether you need to take antibiotics, please call the office before taking any medication.  We advise that you take antibiotics prior to the following invasive procedures for a lifetime. Please wait at least 3-6 months after joint replacement surgery before undergoing dental work or cleaning.    Please take antibiotics one hour before any of the following procedures:  Routine dental cleaning or dental procedure  Root canal  Podiatry procedures that involve cutting into the skin  Dermatologic procedures that involve cutting into the skin.  (Not required for laser or freezing of skin)  Invasive procedures regarding the urinary tract     Antibiotics are not required for the following procedures:   Manicures/Pedicures  Colonoscopy/Endoscopy/Sigmoidoscopy  Routine gynecologic exams/procedures/PAP smears, D&C's  Cataract/laser eye surgery  Injection or blood work  Routine colds and flu and minor cuts and bruises    You may have a flu shot at any time following your surgery.

## 2024-03-07 NOTE — CPM/PAT H&P
CPM/PAT Evaluation     Cristiana Huddleston is a 58 y.o. female   Chief Complaint: knee pain having my knee replaced    HPI:  Patient is a 58 y/  alert and oriented female coming in for PAT for a scheduled Arthroplasty total Knee w/ Navigation - Left on 3/12/24 w/ Dr. Gusman.    The patient reports she has 7/10 achy knee pain.  Pain will be a 10/10 and sharp when she moves the wrong way.  She uses a cane to assist with ambulation.  Her knee is swollen and will give out.  She states physical therapy will intensify the pain.  Being in the pool helps.  She has had physical therapy and is doing physical therapy now.   Patient denies chest pain, SOB, PEGUERO and NVDC.  Patient also denies Hx: DVT/PE.    Current medications were reviewed and a presurgical mediation schedule was provided.    She has no questions at this time.   Past Medical History:   Diagnosis Date    Arthritis     Closed nondisplaced fracture of head of radius with routine healing 02/16/2023    Encounter for follow-up examination after completed treatment for conditions other than malignant neoplasm 09/05/2018    Hospital discharge follow-up    Encounter for gynecological examination (general) (routine) without abnormal findings 11/04/2019    Well female exam with routine gynecological exam    Herpes zoster without complication 10/11/2023    History of falling 09/05/2018    Status post fall    Obesity, unspecified 10/30/2019    Class 1 obesity with body mass index (BMI) of 34.0 to 34.9 in adult, unspecified obesity type, unspecified whether serious comorbidity present    Personal history of other diseases of the musculoskeletal system and connective tissue     History of arthritis    Personal history of other infectious and parasitic diseases     History of chickenpox      Past Surgical History:   Procedure Laterality Date    APPENDECTOMY      DILATION AND CURETTAGE OF UTERUS      KNEE SURGERY Bilateral 09/05/2018    Knee Surgery; meniscal tears with repair     OTHER SURGICAL HISTORY  10/09/2020    Corneal lasik    OTHER SURGICAL HISTORY  12/27/2019    Dilation and curettage    OTHER SURGICAL HISTORY  12/27/2019    Breast augmentation    RETINAL DETACHMENT REPAIR W/ SCLERAL BUCKLE LE Right 12/19/2022        Allergies   Allergen Reactions    Chicken Feathers Allergenic Extract Hives     Chicken Feathers: only where skin has been in contact with feathers        Current Outpatient Medications on File Prior to Visit   Medication Sig Dispense Refill    acetaminophen (Tylenol) 500 mg tablet Take by mouth every 6 hours if needed for mild pain (1 - 3).      acetaminophen (Tylenol) 500 mg tablet Take 2 tablets (1,000 mg) by mouth every 8 hours. 360 tablet 0    aspirin 81 mg chewable tablet Chew 1 tablet (81 mg) 2 times a day. 84 tablet 0    cefadroxil (Duricef) 500 mg capsule Take 1 capsule (500 mg) by mouth 2 times a day for 7 days. 14 capsule 0    diphenhydrAMINE-acetaminophen (Tylenol PM Extra Strength)  mg per tablet Take by mouth as needed at bedtime.      docusate sodium (Colace) 100 mg capsule Take 1 capsule (100 mg) by mouth 2 times a day for 15 days. 30 capsule 0    melatonin 10 mg capsule Take by mouth as needed at bedtime for sleep.      meloxicam (Mobic) 15 mg tablet Take 1 tablet (15 mg) by mouth once daily. 60 tablet 0    multivitamin (Daily Multi-Vitamin) tablet Take 1 tablet by mouth once daily.      ondansetron (Zofran) 4 mg tablet Take 1 tablet (4 mg) by mouth every 8 hours if needed for nausea or vomiting. 20 tablet 0    oxyCODONE (Roxicodone) 5 mg immediate release tablet Take 1-2 tablets (5-10 mg) by mouth every 6 hours if needed for severe pain (7 - 10) for up to 7 days. 40 tablet 0    pantoprazole (ProtoNix) 40 mg EC tablet Take 1 tablet (40 mg) by mouth once daily in the morning. Take before meals. Do not crush, chew, or split. 30 tablet 0    potassium/magnesium (MAGNESIUM-POTASSIUM ORAL) Take by mouth once daily.      sennosides (Senokot) 8.6 mg  tablet Take 1 tablet (8.6 mg) by mouth once daily for 15 days. 15 tablet 0    traMADol (Ultram) 50 mg tablet Take 1-2 tablets ( mg) by mouth every 6 hours if needed for severe pain (7 - 10) for up to 7 days. 40 tablet 0    traZODone (Desyrel) 50 mg tablet Take 2 tablets (100 mg) by mouth once daily at bedtime. 180 tablet 1    vit C/E/Zn/coppr/lutein/zeaxan (PRESERVISION AREDS-2 ORAL) Take 2 tablets by mouth once daily.       No current facility-administered medications on file prior to visit.     Vitals:    03/07/24 1505   BP: 133/83   Pulse: 70   Resp: 16   Temp: 36.7 °C (98.1 °F)   SpO2: 95%         Review of Systems   Musculoskeletal:  Positive for gait problem.        See hpi for details. Reports both knees are bad and will need a right knee replacement   All other systems reviewed and are negative.     Physical Exam  Vitals and nursing note reviewed.   Constitutional:       Appearance: Normal appearance. She is obese.   HENT:      Head: Normocephalic and atraumatic.      Mouth/Throat:      Mouth: Mucous membranes are moist.      Pharynx: Oropharynx is clear.   Eyes:      Pupils: Pupils are equal, round, and reactive to light.   Cardiovascular:      Rate and Rhythm: Normal rate and regular rhythm.      Heart sounds: Normal heart sounds.      Comments: EKG today is NSR rate of 69  Pulmonary:      Effort: Pulmonary effort is normal.      Breath sounds: Normal breath sounds.   Abdominal:      General: Bowel sounds are normal.      Palpations: Abdomen is soft.   Musculoskeletal:         General: Swelling present.      Comments: Swelling of her left knee, antalgic gait, uses a cane   Skin:     General: Skin is warm and dry.   Neurological:      General: No focal deficit present.      Mental Status: She is alert and oriented to person, place, and time.   Psychiatric:         Mood and Affect: Mood normal.         Behavior: Behavior normal.         Thought Content: Thought content normal.         Judgment:  Judgment normal.        PAT AIRWAY:   Airway:     Mallampati::  IV    TM distance::  >3 FB    Neck ROM::  Full    Has own teeth  Does not smoke  No alcohol  Quits CBC Gummies on 3/2/24  No issues with anesthesia  No family issues with anesthesia    Assessment and Plan:   Unilateral Primary Osteoarthritis of Left Knee  Arthroplasty Total Knee w/ Navigation  Managed with tylenol 500mg 2 tablets q6h prn    Obesity  BMI 38.67  Weight loss    Poor Sleep  Managed with trazodone 50mg 2 tablets as directed    ASA II  RCRI - 0 points  Class I Risk 3.9%  MARINO -3  points moderate Risk for JOSE   NSQIP - Predicted length of stay 0-1 days  ARISCAT - 11 points Low Risk 1.6%  DASI 34.7 Points 7.01 Mets  ANDREA - 0.1%  JHFRAT -9  points moderate  risk for falls  Clearance - not indicated  PAT Testing - CBC, BMP, A1C, Type and Screen, MRSA PCR, EKG  Last A1C was 5.0 on 11/3/2023    CHLORHEXIDINE .12% DENTAL RINSE E PRESCIRBED PER  INFECTION PREVENTION PROTOCOL. PATIENT EDUCATED   Patient advised to call [facility] if he does not receive the mouthwash    Face to Face patient contact time 30 minutes    DON Marcum-CNP 3/7/2024 1:26 PM  Results for orders placed or performed in visit on 03/07/24 (from the past 24 hour(s))   Basic Metabolic Panel   Result Value Ref Range    Glucose 94 74 - 99 mg/dL    Sodium 139 136 - 145 mmol/L    Potassium 4.1 3.5 - 5.3 mmol/L    Chloride 104 98 - 107 mmol/L    Bicarbonate 27 21 - 32 mmol/L    Anion Gap 12 10 - 20 mmol/L    Urea Nitrogen 15 6 - 23 mg/dL    Creatinine 0.83 0.50 - 1.05 mg/dL    eGFR 81 >60 mL/min/1.73m*2    Calcium 9.3 8.6 - 10.3 mg/dL   CBC and Auto Differential   Result Value Ref Range    WBC 6.8 4.4 - 11.3 x10*3/uL    nRBC      RBC 4.96 4.00 - 5.20 x10*6/uL    Hemoglobin 13.6 12.0 - 16.0 g/dL    Hematocrit 42.6 36.0 - 46.0 %    MCV 86 80 - 100 fL    MCH 27.4 26.0 - 34.0 pg    MCHC 31.9 (L) 32.0 - 36.0 g/dL    RDW 14.2 11.5 - 14.5 %    Platelets 256 150 - 450 x10*3/uL     Neutrophils % 58.6 40.0 - 80.0 %    Immature Granulocytes %, Automated 0.1 0.0 - 0.9 %    Lymphocytes % 30.2 13.0 - 44.0 %    Monocytes % 7.6 2.0 - 10.0 %    Eosinophils % 2.9 0.0 - 6.0 %    Basophils % 0.6 0.0 - 2.0 %    Neutrophils Absolute 3.99 1.20 - 7.70 x10*3/uL    Immature Granulocytes Absolute, Automated 0.01 0.00 - 0.70 x10*3/uL    Lymphocytes Absolute 2.06 1.20 - 4.80 x10*3/uL    Monocytes Absolute 0.52 0.10 - 1.00 x10*3/uL    Eosinophils Absolute 0.20 0.00 - 0.70 x10*3/uL    Basophils Absolute 0.04 0.00 - 0.10 x10*3/uL   Hemoglobin A1C   Result Value Ref Range    Hemoglobin A1C 5.2 See below %    Estimated Average Glucose 103 Not Established mg/dL   Type and screen   Result Value Ref Range    ABO TYPE O     Rh TYPE POS     ANTIBODY SCREEN NEG    VERIFY ABORH/VERAB   Result Value Ref Range    ABO TYPE O     Rh TYPE POS

## 2024-03-07 NOTE — H&P (VIEW-ONLY)
CPM/PAT Evaluation     Cristiana Huddleston is a 58 y.o. female   Chief Complaint: knee pain having my knee replaced    HPI:  Patient is a 58 y/  alert and oriented female coming in for PAT for a scheduled Arthroplasty total Knee w/ Navigation - Left on 3/12/24 w/ Dr. Gusman.    The patient reports she has 7/10 achy knee pain.  Pain will be a 10/10 and sharp when she moves the wrong way.  She uses a cane to assist with ambulation.  Her knee is swollen and will give out.  She states physical therapy will intensify the pain.  Being in the pool helps.  She has had physical therapy and is doing physical therapy now.   Patient denies chest pain, SOB, PEGUERO and NVDC.  Patient also denies Hx: DVT/PE.    Current medications were reviewed and a presurgical mediation schedule was provided.    She has no questions at this time.   Past Medical History:   Diagnosis Date    Arthritis     Closed nondisplaced fracture of head of radius with routine healing 02/16/2023    Encounter for follow-up examination after completed treatment for conditions other than malignant neoplasm 09/05/2018    Hospital discharge follow-up    Encounter for gynecological examination (general) (routine) without abnormal findings 11/04/2019    Well female exam with routine gynecological exam    Herpes zoster without complication 10/11/2023    History of falling 09/05/2018    Status post fall    Obesity, unspecified 10/30/2019    Class 1 obesity with body mass index (BMI) of 34.0 to 34.9 in adult, unspecified obesity type, unspecified whether serious comorbidity present    Personal history of other diseases of the musculoskeletal system and connective tissue     History of arthritis    Personal history of other infectious and parasitic diseases     History of chickenpox      Past Surgical History:   Procedure Laterality Date    APPENDECTOMY      DILATION AND CURETTAGE OF UTERUS      KNEE SURGERY Bilateral 09/05/2018    Knee Surgery; meniscal tears with repair     OTHER SURGICAL HISTORY  10/09/2020    Corneal lasik    OTHER SURGICAL HISTORY  12/27/2019    Dilation and curettage    OTHER SURGICAL HISTORY  12/27/2019    Breast augmentation    RETINAL DETACHMENT REPAIR W/ SCLERAL BUCKLE LE Right 12/19/2022        Allergies   Allergen Reactions    Chicken Feathers Allergenic Extract Hives     Chicken Feathers: only where skin has been in contact with feathers        Current Outpatient Medications on File Prior to Visit   Medication Sig Dispense Refill    acetaminophen (Tylenol) 500 mg tablet Take by mouth every 6 hours if needed for mild pain (1 - 3).      acetaminophen (Tylenol) 500 mg tablet Take 2 tablets (1,000 mg) by mouth every 8 hours. 360 tablet 0    aspirin 81 mg chewable tablet Chew 1 tablet (81 mg) 2 times a day. 84 tablet 0    cefadroxil (Duricef) 500 mg capsule Take 1 capsule (500 mg) by mouth 2 times a day for 7 days. 14 capsule 0    diphenhydrAMINE-acetaminophen (Tylenol PM Extra Strength)  mg per tablet Take by mouth as needed at bedtime.      docusate sodium (Colace) 100 mg capsule Take 1 capsule (100 mg) by mouth 2 times a day for 15 days. 30 capsule 0    melatonin 10 mg capsule Take by mouth as needed at bedtime for sleep.      meloxicam (Mobic) 15 mg tablet Take 1 tablet (15 mg) by mouth once daily. 60 tablet 0    multivitamin (Daily Multi-Vitamin) tablet Take 1 tablet by mouth once daily.      ondansetron (Zofran) 4 mg tablet Take 1 tablet (4 mg) by mouth every 8 hours if needed for nausea or vomiting. 20 tablet 0    oxyCODONE (Roxicodone) 5 mg immediate release tablet Take 1-2 tablets (5-10 mg) by mouth every 6 hours if needed for severe pain (7 - 10) for up to 7 days. 40 tablet 0    pantoprazole (ProtoNix) 40 mg EC tablet Take 1 tablet (40 mg) by mouth once daily in the morning. Take before meals. Do not crush, chew, or split. 30 tablet 0    potassium/magnesium (MAGNESIUM-POTASSIUM ORAL) Take by mouth once daily.      sennosides (Senokot) 8.6 mg  tablet Take 1 tablet (8.6 mg) by mouth once daily for 15 days. 15 tablet 0    traMADol (Ultram) 50 mg tablet Take 1-2 tablets ( mg) by mouth every 6 hours if needed for severe pain (7 - 10) for up to 7 days. 40 tablet 0    traZODone (Desyrel) 50 mg tablet Take 2 tablets (100 mg) by mouth once daily at bedtime. 180 tablet 1    vit C/E/Zn/coppr/lutein/zeaxan (PRESERVISION AREDS-2 ORAL) Take 2 tablets by mouth once daily.       No current facility-administered medications on file prior to visit.     Vitals:    03/07/24 1505   BP: 133/83   Pulse: 70   Resp: 16   Temp: 36.7 °C (98.1 °F)   SpO2: 95%         Review of Systems   Musculoskeletal:  Positive for gait problem.        See hpi for details. Reports both knees are bad and will need a right knee replacement   All other systems reviewed and are negative.     Physical Exam  Vitals and nursing note reviewed.   Constitutional:       Appearance: Normal appearance. She is obese.   HENT:      Head: Normocephalic and atraumatic.      Mouth/Throat:      Mouth: Mucous membranes are moist.      Pharynx: Oropharynx is clear.   Eyes:      Pupils: Pupils are equal, round, and reactive to light.   Cardiovascular:      Rate and Rhythm: Normal rate and regular rhythm.      Heart sounds: Normal heart sounds.      Comments: EKG today is NSR rate of 69  Pulmonary:      Effort: Pulmonary effort is normal.      Breath sounds: Normal breath sounds.   Abdominal:      General: Bowel sounds are normal.      Palpations: Abdomen is soft.   Musculoskeletal:         General: Swelling present.      Comments: Swelling of her left knee, antalgic gait, uses a cane   Skin:     General: Skin is warm and dry.   Neurological:      General: No focal deficit present.      Mental Status: She is alert and oriented to person, place, and time.   Psychiatric:         Mood and Affect: Mood normal.         Behavior: Behavior normal.         Thought Content: Thought content normal.         Judgment:  Judgment normal.        PAT AIRWAY:   Airway:     Mallampati::  IV    TM distance::  >3 FB    Neck ROM::  Full    Has own teeth  Does not smoke  No alcohol  Quits CBC Gummies on 3/2/24  No issues with anesthesia  No family issues with anesthesia    Assessment and Plan:   Unilateral Primary Osteoarthritis of Left Knee  Arthroplasty Total Knee w/ Navigation  Managed with tylenol 500mg 2 tablets q6h prn    Obesity  BMI 38.67  Weight loss    Poor Sleep  Managed with trazodone 50mg 2 tablets as directed    ASA II  RCRI - 0 points  Class I Risk 3.9%  MARINO -3  points moderate Risk for JOSE   NSQIP - Predicted length of stay 0-1 days  ARISCAT - 11 points Low Risk 1.6%  DASI 34.7 Points 7.01 Mets  ANDREA - 0.1%  JHFRAT -9  points moderate  risk for falls  Clearance - not indicated  PAT Testing - CBC, BMP, A1C, Type and Screen, MRSA PCR, EKG  Last A1C was 5.0 on 11/3/2023    CHLORHEXIDINE .12% DENTAL RINSE E PRESCIRBED PER  INFECTION PREVENTION PROTOCOL. PATIENT EDUCATED   Patient advised to call [place] [facility] if he does not receive the mouthwash    Face to Face patient contact time 30 minutes    DON Marcum-CNP 3/7/2024 1:26 PM  Results for orders placed or performed in visit on 03/07/24 (from the past 24 hour(s))   Basic Metabolic Panel   Result Value Ref Range    Glucose 94 74 - 99 mg/dL    Sodium 139 136 - 145 mmol/L    Potassium 4.1 3.5 - 5.3 mmol/L    Chloride 104 98 - 107 mmol/L    Bicarbonate 27 21 - 32 mmol/L    Anion Gap 12 10 - 20 mmol/L    Urea Nitrogen 15 6 - 23 mg/dL    Creatinine 0.83 0.50 - 1.05 mg/dL    eGFR 81 >60 mL/min/1.73m*2    Calcium 9.3 8.6 - 10.3 mg/dL   CBC and Auto Differential   Result Value Ref Range    WBC 6.8 4.4 - 11.3 x10*3/uL    nRBC      RBC 4.96 4.00 - 5.20 x10*6/uL    Hemoglobin 13.6 12.0 - 16.0 g/dL    Hematocrit 42.6 36.0 - 46.0 %    MCV 86 80 - 100 fL    MCH 27.4 26.0 - 34.0 pg    MCHC 31.9 (L) 32.0 - 36.0 g/dL    RDW 14.2 11.5 - 14.5 %    Platelets 256 150 - 450 x10*3/uL     Neutrophils % 58.6 40.0 - 80.0 %    Immature Granulocytes %, Automated 0.1 0.0 - 0.9 %    Lymphocytes % 30.2 13.0 - 44.0 %    Monocytes % 7.6 2.0 - 10.0 %    Eosinophils % 2.9 0.0 - 6.0 %    Basophils % 0.6 0.0 - 2.0 %    Neutrophils Absolute 3.99 1.20 - 7.70 x10*3/uL    Immature Granulocytes Absolute, Automated 0.01 0.00 - 0.70 x10*3/uL    Lymphocytes Absolute 2.06 1.20 - 4.80 x10*3/uL    Monocytes Absolute 0.52 0.10 - 1.00 x10*3/uL    Eosinophils Absolute 0.20 0.00 - 0.70 x10*3/uL    Basophils Absolute 0.04 0.00 - 0.10 x10*3/uL   Hemoglobin A1C   Result Value Ref Range    Hemoglobin A1C 5.2 See below %    Estimated Average Glucose 103 Not Established mg/dL   Type and screen   Result Value Ref Range    ABO TYPE O     Rh TYPE POS     ANTIBODY SCREEN NEG    VERIFY ABORH/VERAB   Result Value Ref Range    ABO TYPE O     Rh TYPE POS

## 2024-03-07 NOTE — PREPROCEDURE INSTRUCTIONS
Medication List            Accurate as of March 7, 2024  3:31 PM. Always use your most recent med list.                * acetaminophen 500 mg tablet  Commonly known as: Tylenol  Notes to patient: Take as needed     * acetaminophen 500 mg tablet  Commonly known as: Tylenol  Take 2 tablets (1,000 mg) by mouth every 8 hours.  Notes to patient: Has not started     aspirin 81 mg chewable tablet  Chew 1 tablet (81 mg) 2 times a day.  Notes to patient: Not started yet     cefadroxil 500 mg capsule  Commonly known as: Duricef  Take 1 capsule (500 mg) by mouth 2 times a day for 7 days.  Notes to patient: Has not started     chlorhexidine 0.12 % solution  Commonly known as: Peridex  Use 15 mL in the mouth or throat if needed (pre operative 15ml swish and spit the night befor and morning of surgery) for up to 2 doses.  Notes to patient: Use as directed     Daily Multi-Vitamin tablet  Generic drug: multivitamin  Medication Adjustments for Surgery: Stop 7 days before surgery     docusate sodium 100 mg capsule  Commonly known as: Colace  Take 1 capsule (100 mg) by mouth 2 times a day for 15 days.  Notes to patient: Has not started     MAGNESIUM-POTASSIUM ORAL  Medication Adjustments for Surgery: Stop 7 days before surgery     melatonin 10 mg capsule  Medication Adjustments for Surgery: Stop 7 days before surgery     meloxicam 15 mg tablet  Commonly known as: Mobic  Take 1 tablet (15 mg) by mouth once daily.  Notes to patient: Not started yet     ondansetron 4 mg tablet  Commonly known as: Zofran  Take 1 tablet (4 mg) by mouth every 8 hours if needed for nausea or vomiting.  Notes to patient: Not started     oxyCODONE 5 mg immediate release tablet  Commonly known as: Roxicodone  Take 1-2 tablets (5-10 mg) by mouth every 6 hours if needed for severe pain (7 - 10) for up to 7 days.  Notes to patient: Not started     pantoprazole 40 mg EC tablet  Commonly known as: ProtoNix  Take 1 tablet (40 mg) by mouth once daily in the  morning. Take before meals. Do not crush, chew, or split.  Notes to patient: Not started     PRESERVISION AREDS-2 ORAL  Medication Adjustments for Surgery: Stop 7 days before surgery     sennosides 8.6 mg tablet  Commonly known as: Senokot  Take 1 tablet (8.6 mg) by mouth once daily for 15 days.  Notes to patient: Not started     traMADol 50 mg tablet  Commonly known as: Ultram  Take 1-2 tablets ( mg) by mouth every 6 hours if needed for severe pain (7 - 10) for up to 7 days.  Notes to patient: Not started     traZODone 50 mg tablet  Commonly known as: Desyrel  Take 2 tablets (100 mg) by mouth once daily at bedtime.  Notes to patient: Take night before surgery     Tylenol PM Extra Strength  mg per tablet  Generic drug: diphenhydrAMINE-acetaminophen  Notes to patient: Take as needed           * This list has 2 medication(s) that are the same as other medications prescribed for you. Read the directions carefully, and ask your doctor or other care provider to review them with you.                                  NPO Instructions:    Do not eat any food after midnight the night before your surgery/procedure.    Additional Instructions:     Seven/Six Days before Surgery:  Review your medication instructions, stop indicated medications  Five Days before Surgery:  Review your medication instructions, stop indicated medications  Three Days before Surgery:  Review your medication instructions, stop indicated medications  The Day before Surgery:  Review your medication instructions, stop indicated medications  You will be contacted regarding the time of your arrival to facility and surgery time  Do not eat any food after Midnight  Day of Surgery:  Review your medication instructions, take indicated medications  Wear  comfortable loose fitting clothing  Do not use moisturizers, creams, lotions or perfume  All jewelry and valuables should be left at home  PAT DISCHARGE INSTRUCTIONS    Please call the Same Day  Surgery (SDS) Department of the hospital where your procedure will be performed between 2:00- 3:30 PM the day before your surgery. If you are scheduled on a Monday, or a Tuesday following a Monday holiday, you will need to call on the last business day prior to your surgery.    [facility]  [address] [place] [address]  [phone]    Please let your surgeon know if:      You develop any open sores, shingles, burning or painful urination as these may increase your risk of an infection.   You no longer wish to have the surgery.   Any other personal circumstances change that may lead to the need to cancel or defer this surgery-such as being sick or getting admitted to any hospital within one week of your planned procedure.    Your contact details change, such as a change of address or phone number.    Starting now:     Please DO NOT drink alcohol or smoke for 24 hours before surgery. It is well known that quitting smoking can make a huge difference to your health and recovery from surgery. The longer you abstain from smoking, the better your chances of a healthy recovery. If you need help with quitting, call 1800-QUIT-NOW to be connected to a trained counselor who will discuss the best methods to help you quit.     Before your surgery:    Please stop all supplements 7 days prior to surgery. Or as directed by your surgeon.   Please stop taking NSAID pain medicine such as Advil and Motrin 7 days before surgery.    If you develop any fever, cough, cold, rashes, cuts, scratches, scrapes, urinary symptoms or infection anywhere on your body (including teeth and gums) prior to surgery, please call your surgeon’s office as soon as possible. This may require treatment to reduce the chance of cancellation on the day of surgery.    The day before your surgery:   DIET- Do not eat any food after MIDNIGHT.   Get a good night’s rest.  Use the special soap for bathing if you have been  instructed to use one.    Scheduled surgery times may change and you will be notified if this occurs - please check your personal voicemail for any updates.     On the morning of surgery:   Wear comfortable, loose fitting clothes which open in the front. Please do not wear moisturizers, creams, lotions, makeup or perfume.    Please bring with you to surgery:   Photo ID and insurance card   Current list of medicines and allergies   Pacemaker/ Defibrillator/Heart stent cards   CPAP machine and mask    Slings/ splints/ crutches   A copy of your complete advanced directive/DHPOA.    Please do NOT bring with you to surgery:   All jewelry and valuables should be left at home.   Prosthetic devices such as contact lenses, hearing aids, dentures, eyelash extensions, hairpins and body piercings must be removed prior to going in to the surgical suite.    After outpatient surgery:   A responsible adult MUST accompany you at the time of discharge and stay with you for 24 hours after your surgery. You may NOT drive yourself home after surgery.    Do not drive, operate machinery, make critical decisions or do activities that require co-ordination or balance until after a night’s sleep.   Do not drink alcoholic beverages for 24 hours.   Instructions for resuming your medications will be provided by your surgeon.    CALL YOUR DOCTOR AFTER SURGERY IF YOU HAVE:     Chills and/or a fever of 101° F or higher.    Redness, swelling, pus or drainage from your surgical wound or a bad smell from the wound.    Lightheadedness, fainting or confusion.    Persistent vomiting (throwing up) and are not able to eat or drink for 12 hours.    Three or more loose, watery bowel movements in 24 hours (diarrhea).   Difficulty or pain while urinating( after non-urological surgery)    Pain and swelling in your legs, especially if it is only on one side.    Difficulty breathing or are breathing faster than normal.    Any new concerning  symptoms.      Reviewed pre-op instructions with patient, states understanding and denies further questions at this time.    If you have not received a call regarding your arrival time for surgery by 2pm on the day before surgery, you can call [phone].    Take Care   Home Preoperative Antibacterial Shower    What is a home preoperative antibacterial shower?  This shower is a way of cleaning the skin with a germ killing solution before surgery. The solution contains chlorhexidine, commonly known as CHG. CHG is a skin cleanser with germ killing ability. Let your doctor know if you are allergic to chlorhexidine.      Why do I need to take a preoperative antibacterial shower?  Skin is not sterile. It is best to try to make your skin as free of germs as possible before surgery. Proper cleansing with a germ killing soap before surgery can lower the number of germs on your skin. This helps to reduce the risk of infection at the surgical site. Following the instructions listed below will help you prepare your skin for surgery.    How do I use the solution?      Steps: Begin using your CHG soap FIVE DAYS BEFORE your scheduled surgery on __________________________________________________.  First, wash and rinse your hair using the CHG soap. Keep CHG away soap away from ear canals and eyes.  Rinse completely, do not condition. Hair extensions should be removed.  Wash your face with your normal soap and rinse.  Apply the CHG solution to a clean wet washcloth. Turn the water off or move away from the water spray to avoid premature rinsing of the CHG soap as you are applying.  Firmly lather your entire body from neck down. Do not use on face.  Pay special attention to the areas(s) where your incision(s) will be located unless they are on your face.  Avoid scrubbing your skin too hard.  The important point is to have the CHG soap sit on your skin for 3 minutes.  DO NOT wash with regular soap after you have used the CHG soap  solution.  Pat yourself dry with a clean, freshly laundered towel.  DO NOT apply powders, deodorants or lotions.  Dress in clean, freshly laundered night clothes.  Be sure to sleep with clean, freshly laundered sheets.  Be aware that CHG will cause stains on fabrics; if you wash them with bleach after use. Rinse your washcloth and other linens that have contact with CHG completely. Use only non-chlorine detergents to launder the items used.  The morning of surgery is the fifth day. Repeat the above steps and dress in clean comfortable clothing.      Who should I call if I have any questions regarding the use of CHG soap?  Call the [facility] for Per[facility] at [phone] if you have any questions.  PAT DISCHARGE INSTRUCTIONS    Please call the Same Day Surgery (SDS) Department of the hospital where your procedure will be performed between 2:00- 3:30 PM the day before your surgery. If you are scheduled on a Monday, or a Tuesday following a Monday holiday, you will need to call on the last business day prior to your surgery.    [facility] [place]  [address] [address]  [phone]    Please let your surgeon know if:      You develop any open sores, shingles, burning or painful urination as these may increase your risk of an infection.   You no longer wish to have the surgery.   Any other personal circumstances change that may lead to the need to cancel or defer this surgery-such as being sick or getting admitted to any hospital within one week of your planned procedure.    Your contact details change, such as a change of address or phone number.    Starting now:     Please DO NOT drink alcohol or smoke for 24 hours before surgery. It is well known that quitting smoking can make a huge difference to your health and recovery from surgery. The longer you abstain from smoking, the better your chances of a healthy  recovery. If you need help with quitting, call 3-280-QUIT-NOW to be connected to a trained counselor who will discuss the best methods to help you quit.     Before your surgery:    Please stop all supplements 7 days prior to surgery. Or as directed by your surgeon.   Please stop taking NSAID pain medicine such as Advil and Motrin 7 days before surgery.    If you develop any fever, cough, cold, rashes, cuts, scratches, scrapes, urinary symptoms or infection anywhere on your body (including teeth and gums) prior to surgery, please call your surgeon’s office as soon as possible. This may require treatment to reduce the chance of cancellation on the day of surgery.    The day before your surgery:   DIET- Do not eat any food after MIDNIGHT.   Get a good night’s rest.  Use the special soap for bathing if you have been instructed to use one.    Scheduled surgery times may change and you will be notified if this occurs - please check your personal voicemail for any updates.     On the morning of surgery:   Wear comfortable, loose fitting clothes which open in the front. Please do not wear moisturizers, creams, lotions, makeup or perfume.    Please bring with you to surgery:   Photo ID and insurance card   Current list of medicines and allergies   Pacemaker/ Defibrillator/Heart stent cards   CPAP machine and mask    Slings/ splints/ crutches   A copy of your complete advanced directive/DHPOA.    Please do NOT bring with you to surgery:   All jewelry and valuables should be left at home.   Prosthetic devices such as contact lenses, hearing aids, dentures, eyelash extensions, hairpins and body piercings must be removed prior to going in to the surgical suite.    After outpatient surgery:   A responsible adult MUST accompany you at the time of discharge and stay with you for 24 hours after your surgery. You may NOT drive yourself home after surgery.    Do not drive, operate machinery, make critical decisions or do activities  that require co-ordination or balance until after a night’s sleep.   Do not drink alcoholic beverages for 24 hours.   Instructions for resuming your medications will be provided by your surgeon.    CALL YOUR DOCTOR AFTER SURGERY IF YOU HAVE:     Chills and/or a fever of 101° F or higher.    Redness, swelling, pus or drainage from your surgical wound or a bad smell from the wound.    Lightheadedness, fainting or confusion.    Persistent vomiting (throwing up) and are not able to eat or drink for 12 hours.    Three or more loose, watery bowel movements in 24 hours (diarrhea).   Difficulty or pain while urinating( after non-urological surgery)    Pain and swelling in your legs, especially if it is only on one side.    Difficulty breathing or are breathing faster than normal.    Any new concerning symptoms.      Reviewed pre-op instructions with patient, states understanding and denies further questions at this time.    If you have not received a call regarding your arrival time for surgery by 2pm on the day before surgery, you can call [phone].    Take Care   Patient Information: Oral/Dental Rinse    What is oral/dental rinse?  It is a mouthwash. It is a way of cleaning the mouth with a germ killing solution before your surgery. The solution contains chlorhexidine, commonly know as CHG.  It is used inside the mouth to kill bacteria known as Staphylococcus aureus.  Let your doctor know if you are allergic to chlorhexidine.    Why do I need to use CHG oral/dental rinse?  The CHG oral/dental rinse helps to kill bacteria in your mouth known as Staphylococcus aureus. This reduces the risk of infection at the surgical site.    Using your CHG oral/dental rinse.  STEPS: use your CHG oral/dental rinse after you brush your teeth the night before (at bedtime) and the morning of your surgery. Follow the directions on your prescription label.  Use the cap on the container to measure 15ml (fill cap to fill line)  Swish (gargle  if you can) the mouthwash in your mouth for at least 30 seconds, (do not swallow) spit out.  After you use your CHG rinse, do not rinse your mouth with water, drink or eat. Please refer to prescription label for the appropriate time to resume oral intake.    What side effects might I have using the CHG oral/dental rinse?  CHG rinse will stick to the plaque on the teeth. Brush and floss just before use. Teeth brushing will help avoid staining of plaque during use.    Who should I contact if I have questions about the CHG oral/dental rinse?  Please call [facility], Center for Perioperative Medicine at [phone] if you have any questions.CONTACT SURGEON'S OFFICE IF YOU DEVELOP:  * Fever = 100.4 F   * New respiratory symptoms (e.g. cough, shortness of breath, respiratory distress, sore throat)  * Recent loss of taste or smell  *Flu like symptoms such as headache, fatigue or gastrointestinal symptoms  * You develop any open sores, shingles, burning or painful urination   AND/OR:  * You no longer wish to have the surgery.  * Any other personal circumstances change that may lead to the need to cancel or defer this surgery.  *You were admitted to any hospital within one week of your planned procedure.    SMOKING:  *Quitting smoking can make a huge difference to your health and recovery from surgery.    *If you need help with quitting, call 1-776-QUITNOW.    THE DAY BEFORE SURGERY:  *Do not eat any food after midnight the night before your surgery.   *You may have up to TEN OUNCES of clear liquids until TWO hours before your instructed ARRIVAL TIME to hospital. This includes water, black tea/coffee, (no milk or cream) apple juice, clear broth and electrolyte drinks (Gatorade). Please avoid clear liquids that are red in color.   *You may chew gum/mints up to TWO hours before your surgery/procedure.    SURGICAL TIME:  *You will be contacted between 2 p.m. and 3 p.m. the business day  before your surgery with your arrival time.  *If you haven't received a call by 3pm, call [phone]  *Scheduled surgery times may change and you will be notified if this occurs-check your personal voicemail for any updates.    ON THE MORNING OF SURGERY:  *Wear comfortable, loose fitting clothing.   *Do not use moisturizers, creams, lotions or perfume.  *All jewelry and valuables should be left at home.  *Prosthetic devices such as contact lenses, hearing aids, dentures, eyelash extensions, hairpins and body piercing must be removed before surgery.    BRING WITH YOU:  *Photo ID and insurance card  *Current list of medications and allergies  *Pacemaker/Defibrillator/Heart stent cards  *CPAP machine and mask  *Slings/splints/crutches  *Copy of your complete Advanced Directive/DHPOA-if applicable  *Neurostimulator implant remote    PARKING AND ARRIVAL:  *Check in at the Main Entrance desk and let them know you are here for surgery.    IF YOU ARE HAVING OUTPATIENT/SAME DAY SURGERY:  *A responsible adult MUST accompany you at the time of discharge and stay with you for 24 hours after your surgery.  *You may NOT drive yourself home after surgery.  *You may use a taxi or ride sharing service ([employer], Uber) to return home ONLY if you are accompanied by a friend or family member.  *Instructions for resuming your medications will be provided by your surgeon.    Thank you for coming to Pre Admission testing.     If I have prescribe medication please don't forget to  at your pharmacy.     Any questions about today's visit call [phone] and leave a message in the general mailbox.    Patient instructed to ambulate as soon as possible postoperatively to decrease thromboembolic risk.    Coni Stewart RN

## 2024-03-08 DIAGNOSIS — M17.12 PRIMARY OSTEOARTHRITIS OF LEFT KNEE: Primary | ICD-10-CM

## 2024-03-08 RX ORDER — MUPIROCIN 20 MG/G
OINTMENT TOPICAL 2 TIMES DAILY
Qty: 50 G | Refills: 0 | Status: SHIPPED | OUTPATIENT
Start: 2024-03-08 | End: 2024-03-13

## 2024-03-09 LAB — STAPHYLOCOCCUS SPEC CULT: NORMAL

## 2024-03-10 NOTE — PROGRESS NOTES
" Deepa Gusman MD   Adult Reconstruction and Joint Replacement Surgery  Phone: [phone]     Fax: [phone]     KNEE REPLACEMENT POST-OP VISIT     Name: Cristiana Huddleston  : 1965  Date of Visit: 24    Chief Complaint:  Left Total Knee Replacement Surgery Follow-Up    History of Present Illness:    The patient is now 13 days status post left Total knee replacement surgery.    The patient is doing home physical therapy and is progressing well. She states she can climb one flight of stairs and walk for about 0.5 miles.     Denies fevers or drainage from the incision.    Patient is walking with cane for assistive device.    The patient has no fevers or drainage from the incision.    The patient is currently taking tylenol and occasionally narcotic for pain.     The patient is currently taking ASA 81mg BID for DVT prophylaxis.    There are no concerns.    Physical Exam:    The patient is well appearing, alert and oriented to person place and time.    The incision is healing without complication. No drainage or evidence of infection.    Range of motion is excellent and strength is improving. ROM today is 3 to 95 deg.     There is no instability of the joint.    Homans sign is negative.    Neurologic, and vascular examinations are normal.    PROMs    24   \"KOOS, JR. Score\" 42.28       Imaging:    X-rays were personally reviewed today and show implants in good position with no evidence of complication. There have been no significant interval changes.    Impression and Plan:  58 y.o. female now 13 days status post Left Total knee replacement.    The patient is doing well following Total knee replacement surgery.  Antibiotic prophylaxis prior to dental procedures were reviewed.  Long-term failure mechanisms were reviewed.  A new PT prescription was given to the patient and we reviewed exercises to be performed at home to work on improving range of motion and strength. The patient was asked to " contact the office sooner if there are any concerns.    RTC: 6 weeks     X-rays at next visit: Postop TKA series    _____________________  Deepa Gusman MD  [facility]

## 2024-03-11 ENCOUNTER — ANESTHESIA EVENT (OUTPATIENT)
Dept: OPERATING ROOM | Facility: HOSPITAL | Age: 59
End: 2024-03-11
Payer: COMMERCIAL

## 2024-03-11 DIAGNOSIS — F41.9 ANXIETY: ICD-10-CM

## 2024-03-11 DIAGNOSIS — Z72.820 POOR SLEEP: ICD-10-CM

## 2024-03-11 LAB
ATRIAL RATE: 69 BPM
P AXIS: 53 DEGREES
P OFFSET: 208 MS
P ONSET: 149 MS
PR INTERVAL: 152 MS
Q ONSET: 225 MS
QRS COUNT: 11 BEATS
QRS DURATION: 86 MS
QT INTERVAL: 408 MS
QTC CALCULATION(BAZETT): 437 MS
QTC FREDERICIA: 427 MS
R AXIS: 7 DEGREES
T AXIS: 24 DEGREES
T OFFSET: 429 MS
VENTRICULAR RATE: 69 BPM

## 2024-03-11 RX ORDER — TRAZODONE HYDROCHLORIDE 50 MG/1
100 TABLET ORAL NIGHTLY
Qty: 180 TABLET | Refills: 1 | Status: SHIPPED | OUTPATIENT
Start: 2024-03-11 | End: 2024-03-14 | Stop reason: SDUPTHER

## 2024-03-12 ENCOUNTER — DOCUMENTATION (OUTPATIENT)
Dept: HOME HEALTH SERVICES | Facility: HOME HEALTH | Age: 59
End: 2024-03-12
Payer: COMMERCIAL

## 2024-03-12 ENCOUNTER — ANESTHESIA (OUTPATIENT)
Dept: OPERATING ROOM | Facility: HOSPITAL | Age: 59
End: 2024-03-12
Payer: COMMERCIAL

## 2024-03-12 ENCOUNTER — APPOINTMENT (OUTPATIENT)
Dept: RADIOLOGY | Facility: HOSPITAL | Age: 59
End: 2024-03-12
Payer: COMMERCIAL

## 2024-03-12 ENCOUNTER — PHARMACY VISIT (OUTPATIENT)
Dept: PHARMACY | Facility: CLINIC | Age: 59
End: 2024-03-12
Payer: COMMERCIAL

## 2024-03-12 ENCOUNTER — HOSPITAL ENCOUNTER (OUTPATIENT)
Facility: HOSPITAL | Age: 59
Setting detail: OUTPATIENT SURGERY
Discharge: HOME | End: 2024-03-12
Attending: STUDENT IN AN ORGANIZED HEALTH CARE EDUCATION/TRAINING PROGRAM | Admitting: STUDENT IN AN ORGANIZED HEALTH CARE EDUCATION/TRAINING PROGRAM
Payer: COMMERCIAL

## 2024-03-12 VITALS
RESPIRATION RATE: 16 BRPM | WEIGHT: 268.96 LBS | HEART RATE: 106 BPM | SYSTOLIC BLOOD PRESSURE: 120 MMHG | HEIGHT: 70 IN | DIASTOLIC BLOOD PRESSURE: 93 MMHG | OXYGEN SATURATION: 97 % | TEMPERATURE: 98.8 F | BODY MASS INDEX: 38.51 KG/M2

## 2024-03-12 DIAGNOSIS — M17.12 UNILATERAL PRIMARY OSTEOARTHRITIS, LEFT KNEE: Primary | ICD-10-CM

## 2024-03-12 PROBLEM — E66.9 OBESITY: Status: ACTIVE | Noted: 2024-03-12

## 2024-03-12 PROCEDURE — 2500000004 HC RX 250 GENERAL PHARMACY W/ HCPCS (ALT 636 FOR OP/ED): Performed by: STUDENT IN AN ORGANIZED HEALTH CARE EDUCATION/TRAINING PROGRAM

## 2024-03-12 PROCEDURE — C1713 ANCHOR/SCREW BN/BN,TIS/BN: HCPCS | Performed by: STUDENT IN AN ORGANIZED HEALTH CARE EDUCATION/TRAINING PROGRAM

## 2024-03-12 PROCEDURE — 2500000004 HC RX 250 GENERAL PHARMACY W/ HCPCS (ALT 636 FOR OP/ED): Performed by: ANESTHESIOLOGY

## 2024-03-12 PROCEDURE — 64447 NJX AA&/STRD FEMORAL NRV IMG: CPT | Performed by: ANESTHESIOLOGY

## 2024-03-12 PROCEDURE — 2720000007 HC OR 272 NO HCPCS: Performed by: STUDENT IN AN ORGANIZED HEALTH CARE EDUCATION/TRAINING PROGRAM

## 2024-03-12 PROCEDURE — A27447 PR TOTAL KNEE ARTHROPLASTY: Performed by: ANESTHESIOLOGY

## 2024-03-12 PROCEDURE — 73560 X-RAY EXAM OF KNEE 1 OR 2: CPT | Mod: LT

## 2024-03-12 PROCEDURE — 7100000010 HC PHASE TWO TIME - EACH INCREMENTAL 1 MINUTE: Performed by: STUDENT IN AN ORGANIZED HEALTH CARE EDUCATION/TRAINING PROGRAM

## 2024-03-12 PROCEDURE — 7100000002 HC RECOVERY ROOM TIME - EACH INCREMENTAL 1 MINUTE: Performed by: STUDENT IN AN ORGANIZED HEALTH CARE EDUCATION/TRAINING PROGRAM

## 2024-03-12 PROCEDURE — 2500000004 HC RX 250 GENERAL PHARMACY W/ HCPCS (ALT 636 FOR OP/ED): Performed by: ANESTHESIOLOGIST ASSISTANT

## 2024-03-12 PROCEDURE — 3600000018 HC OR TIME - INITIAL BASE CHARGE - PROCEDURE LEVEL SIX: Performed by: STUDENT IN AN ORGANIZED HEALTH CARE EDUCATION/TRAINING PROGRAM

## 2024-03-12 PROCEDURE — 3700000002 HC GENERAL ANESTHESIA TIME - EACH INCREMENTAL 1 MINUTE: Performed by: STUDENT IN AN ORGANIZED HEALTH CARE EDUCATION/TRAINING PROGRAM

## 2024-03-12 PROCEDURE — 7100000009 HC PHASE TWO TIME - INITIAL BASE CHARGE: Performed by: STUDENT IN AN ORGANIZED HEALTH CARE EDUCATION/TRAINING PROGRAM

## 2024-03-12 PROCEDURE — 2500000005 HC RX 250 GENERAL PHARMACY W/O HCPCS: Performed by: STUDENT IN AN ORGANIZED HEALTH CARE EDUCATION/TRAINING PROGRAM

## 2024-03-12 PROCEDURE — 97161 PT EVAL LOW COMPLEX 20 MIN: CPT | Mod: GP

## 2024-03-12 PROCEDURE — A27447 PR TOTAL KNEE ARTHROPLASTY: Performed by: ANESTHESIOLOGIST ASSISTANT

## 2024-03-12 PROCEDURE — 2500000005 HC RX 250 GENERAL PHARMACY W/O HCPCS: Performed by: ANESTHESIOLOGY

## 2024-03-12 PROCEDURE — 7100000001 HC RECOVERY ROOM TIME - INITIAL BASE CHARGE: Performed by: STUDENT IN AN ORGANIZED HEALTH CARE EDUCATION/TRAINING PROGRAM

## 2024-03-12 PROCEDURE — 97530 THERAPEUTIC ACTIVITIES: CPT | Mod: GP

## 2024-03-12 PROCEDURE — 2500000005 HC RX 250 GENERAL PHARMACY W/O HCPCS: Performed by: ANESTHESIOLOGIST ASSISTANT

## 2024-03-12 PROCEDURE — 2780000003 HC OR 278 NO HCPCS: Performed by: STUDENT IN AN ORGANIZED HEALTH CARE EDUCATION/TRAINING PROGRAM

## 2024-03-12 PROCEDURE — 97110 THERAPEUTIC EXERCISES: CPT | Mod: GP

## 2024-03-12 PROCEDURE — 27447 TOTAL KNEE ARTHROPLASTY: CPT | Performed by: STUDENT IN AN ORGANIZED HEALTH CARE EDUCATION/TRAINING PROGRAM

## 2024-03-12 PROCEDURE — 64999 UNLISTED PX NERVOUS SYSTEM: CPT | Performed by: ANESTHESIOLOGY

## 2024-03-12 PROCEDURE — 3700000001 HC GENERAL ANESTHESIA TIME - INITIAL BASE CHARGE: Performed by: STUDENT IN AN ORGANIZED HEALTH CARE EDUCATION/TRAINING PROGRAM

## 2024-03-12 PROCEDURE — C1776 JOINT DEVICE (IMPLANTABLE): HCPCS | Performed by: STUDENT IN AN ORGANIZED HEALTH CARE EDUCATION/TRAINING PROGRAM

## 2024-03-12 PROCEDURE — 3600000017 HC OR TIME - EACH INCREMENTAL 1 MINUTE - PROCEDURE LEVEL SIX: Performed by: STUDENT IN AN ORGANIZED HEALTH CARE EDUCATION/TRAINING PROGRAM

## 2024-03-12 PROCEDURE — 97116 GAIT TRAINING THERAPY: CPT | Mod: GP

## 2024-03-12 PROCEDURE — RXMED WILLOW AMBULATORY MEDICATION CHARGE

## 2024-03-12 PROCEDURE — 94760 N-INVAS EAR/PLS OXIMETRY 1: CPT

## 2024-03-12 DEVICE — ATTUNE PATELLA MEDIALIZED DOME 35MM CEMENTED AOX
Type: IMPLANTABLE DEVICE | Site: KNEE | Status: FUNCTIONAL
Brand: ATTUNE

## 2024-03-12 DEVICE — TIBAL BASE ATTUNE FB, SZ 4 CEM: Type: IMPLANTABLE DEVICE | Site: KNEE | Status: FUNCTIONAL

## 2024-03-12 DEVICE — ATTUNE KNEE SYSTEM FEMORAL CRUCIATE RETAINING SIZE 4 LEFT CEMENTED
Type: IMPLANTABLE DEVICE | Site: KNEE | Status: FUNCTIONAL
Brand: ATTUNE

## 2024-03-12 DEVICE — TOBRA FULL DOSE ANTIBIOTIC BONE CEMENT, 10 PACK CATALOG NUMBER IS 6197-9-010
Type: IMPLANTABLE DEVICE | Site: KNEE | Status: FUNCTIONAL
Brand: SIMPLEX

## 2024-03-12 RX ORDER — DEXAMETHASONE SODIUM PHOSPHATE 10 MG/ML
INJECTION INTRAMUSCULAR; INTRAVENOUS AS NEEDED
Status: DISCONTINUED | OUTPATIENT
Start: 2024-03-12 | End: 2024-03-12

## 2024-03-12 RX ORDER — SODIUM CHLORIDE 9 MG/ML
20 INJECTION INTRAMUSCULAR; INTRAVENOUS; SUBCUTANEOUS ONCE
Status: DISCONTINUED | OUTPATIENT
Start: 2024-03-12 | End: 2024-03-12 | Stop reason: ALTCHOICE

## 2024-03-12 RX ORDER — SODIUM CHLORIDE 9 MG/ML
22 INJECTION INTRAMUSCULAR; INTRAVENOUS; SUBCUTANEOUS ONCE
Status: DISCONTINUED | OUTPATIENT
Start: 2024-03-12 | End: 2024-03-12 | Stop reason: ALTCHOICE

## 2024-03-12 RX ORDER — BUPIVACAINE HYDROCHLORIDE 2.5 MG/ML
INJECTION, SOLUTION INFILTRATION; PERINEURAL AS NEEDED
Status: DISCONTINUED | OUTPATIENT
Start: 2024-03-12 | End: 2024-03-12

## 2024-03-12 RX ORDER — BUPIVACAINE HYDROCHLORIDE 7.5 MG/ML
INJECTION, SOLUTION INTRASPINAL AS NEEDED
Status: DISCONTINUED | OUTPATIENT
Start: 2024-03-12 | End: 2024-03-12

## 2024-03-12 RX ORDER — BUPIVACAINE HYDROCHLORIDE 5 MG/ML
INJECTION, SOLUTION PERINEURAL AS NEEDED
Status: DISCONTINUED | OUTPATIENT
Start: 2024-03-12 | End: 2024-03-12 | Stop reason: HOSPADM

## 2024-03-12 RX ORDER — METHYLPREDNISOLONE ACETATE 40 MG/ML
INJECTION, SUSPENSION INTRA-ARTICULAR; INTRALESIONAL; INTRAMUSCULAR; SOFT TISSUE AS NEEDED
Status: DISCONTINUED | OUTPATIENT
Start: 2024-03-12 | End: 2024-03-12 | Stop reason: HOSPADM

## 2024-03-12 RX ORDER — FENTANYL CITRATE 50 UG/ML
INJECTION, SOLUTION INTRAMUSCULAR; INTRAVENOUS AS NEEDED
Status: DISCONTINUED | OUTPATIENT
Start: 2024-03-12 | End: 2024-03-12

## 2024-03-12 RX ORDER — SODIUM CHLORIDE, SODIUM LACTATE, POTASSIUM CHLORIDE, CALCIUM CHLORIDE 600; 310; 30; 20 MG/100ML; MG/100ML; MG/100ML; MG/100ML
100 INJECTION, SOLUTION INTRAVENOUS CONTINUOUS
Status: DISCONTINUED | OUTPATIENT
Start: 2024-03-12 | End: 2024-03-12 | Stop reason: ALTCHOICE

## 2024-03-12 RX ORDER — FENTANYL CITRATE 50 UG/ML
12.5 INJECTION, SOLUTION INTRAMUSCULAR; INTRAVENOUS EVERY 5 MIN PRN
Status: DISCONTINUED | OUTPATIENT
Start: 2024-03-12 | End: 2024-03-12 | Stop reason: HOSPADM

## 2024-03-12 RX ORDER — ONDANSETRON HYDROCHLORIDE 2 MG/ML
4 INJECTION, SOLUTION INTRAVENOUS ONCE AS NEEDED
Status: DISCONTINUED | OUTPATIENT
Start: 2024-03-12 | End: 2024-03-12 | Stop reason: HOSPADM

## 2024-03-12 RX ORDER — OXYCODONE HYDROCHLORIDE 5 MG/1
5 TABLET ORAL EVERY 6 HOURS PRN
Status: DISCONTINUED | OUTPATIENT
Start: 2024-03-12 | End: 2024-03-12 | Stop reason: HOSPADM

## 2024-03-12 RX ORDER — ACETAMINOPHEN 325 MG/1
650 TABLET ORAL EVERY 6 HOURS SCHEDULED
Status: DISCONTINUED | OUTPATIENT
Start: 2024-03-12 | End: 2024-03-12 | Stop reason: HOSPADM

## 2024-03-12 RX ORDER — CEFAZOLIN SODIUM IN 0.9 % NACL 3 G/100 ML
3 INTRAVENOUS SOLUTION, PIGGYBACK (ML) INTRAVENOUS ONCE
Status: COMPLETED | OUTPATIENT
Start: 2024-03-12 | End: 2024-03-12

## 2024-03-12 RX ORDER — IBUPROFEN 600 MG/1
600 TABLET ORAL 3 TIMES DAILY
Status: DISCONTINUED | OUTPATIENT
Start: 2024-03-12 | End: 2024-03-12 | Stop reason: HOSPADM

## 2024-03-12 RX ORDER — SODIUM CHLORIDE, SODIUM LACTATE, POTASSIUM CHLORIDE, CALCIUM CHLORIDE 600; 310; 30; 20 MG/100ML; MG/100ML; MG/100ML; MG/100ML
50 INJECTION, SOLUTION INTRAVENOUS CONTINUOUS
Status: DISCONTINUED | OUTPATIENT
Start: 2024-03-12 | End: 2024-03-12 | Stop reason: HOSPADM

## 2024-03-12 RX ORDER — OXYCODONE HYDROCHLORIDE 5 MG/1
2.5 TABLET ORAL EVERY 4 HOURS PRN
Status: DISCONTINUED | OUTPATIENT
Start: 2024-03-12 | End: 2024-03-12 | Stop reason: HOSPADM

## 2024-03-12 RX ORDER — SCOLOPAMINE TRANSDERMAL SYSTEM 1 MG/1
1 PATCH, EXTENDED RELEASE TRANSDERMAL ONCE
Status: DISCONTINUED | OUTPATIENT
Start: 2024-03-12 | End: 2024-03-12 | Stop reason: HOSPADM

## 2024-03-12 RX ORDER — ALBUTEROL SULFATE 0.83 MG/ML
2.5 SOLUTION RESPIRATORY (INHALATION) ONCE AS NEEDED
Status: DISCONTINUED | OUTPATIENT
Start: 2024-03-12 | End: 2024-03-12 | Stop reason: HOSPADM

## 2024-03-12 RX ORDER — ASPIRIN 81 MG/1
81 TABLET ORAL 2 TIMES DAILY
Status: DISCONTINUED | OUTPATIENT
Start: 2024-03-12 | End: 2024-03-12 | Stop reason: HOSPADM

## 2024-03-12 RX ORDER — ONDANSETRON 4 MG/1
4 TABLET, ORALLY DISINTEGRATING ORAL EVERY 8 HOURS PRN
Status: DISCONTINUED | OUTPATIENT
Start: 2024-03-12 | End: 2024-03-12 | Stop reason: HOSPADM

## 2024-03-12 RX ORDER — TRANEXAMIC ACID 100 MG/ML
INJECTION, SOLUTION INTRAVENOUS AS NEEDED
Status: DISCONTINUED | OUTPATIENT
Start: 2024-03-12 | End: 2024-03-12

## 2024-03-12 RX ORDER — FENTANYL CITRATE 50 UG/ML
100 INJECTION, SOLUTION INTRAMUSCULAR; INTRAVENOUS ONCE
Status: COMPLETED | OUTPATIENT
Start: 2024-03-12 | End: 2024-03-12

## 2024-03-12 RX ORDER — POLYETHYLENE GLYCOL 3350 17 G/17G
17 POWDER, FOR SOLUTION ORAL DAILY
Status: DISCONTINUED | OUTPATIENT
Start: 2024-03-13 | End: 2024-03-12 | Stop reason: HOSPADM

## 2024-03-12 RX ORDER — OXYCODONE HYDROCHLORIDE 5 MG/1
10 TABLET ORAL EVERY 4 HOURS PRN
Status: DISCONTINUED | OUTPATIENT
Start: 2024-03-12 | End: 2024-03-12 | Stop reason: HOSPADM

## 2024-03-12 RX ORDER — BUPIVACAINE HCL/EPINEPHRINE 0.5-1:200K
30 VIAL (ML) INJECTION ONCE
Status: DISCONTINUED | OUTPATIENT
Start: 2024-03-12 | End: 2024-03-12 | Stop reason: ALTCHOICE

## 2024-03-12 RX ORDER — PANTOPRAZOLE SODIUM 40 MG/1
40 TABLET, DELAYED RELEASE ORAL
Status: DISCONTINUED | OUTPATIENT
Start: 2024-03-13 | End: 2024-03-12 | Stop reason: HOSPADM

## 2024-03-12 RX ORDER — ADHESIVE BANDAGE
10 BANDAGE TOPICAL DAILY PRN
Status: DISCONTINUED | OUTPATIENT
Start: 2024-03-12 | End: 2024-03-12 | Stop reason: HOSPADM

## 2024-03-12 RX ORDER — CEFAZOLIN SODIUM IN 0.9 % NACL 3 G/100 ML
3 INTRAVENOUS SOLUTION, PIGGYBACK (ML) INTRAVENOUS EVERY 8 HOURS
Status: DISCONTINUED | OUTPATIENT
Start: 2024-03-12 | End: 2024-03-12 | Stop reason: HOSPADM

## 2024-03-12 RX ORDER — CEFAZOLIN 1 G/1
500 INJECTION, POWDER, FOR SOLUTION INTRAVENOUS ONCE
Status: COMPLETED | OUTPATIENT
Start: 2024-03-12 | End: 2024-03-12

## 2024-03-12 RX ORDER — MIDAZOLAM HYDROCHLORIDE 1 MG/ML
2 INJECTION, SOLUTION INTRAMUSCULAR; INTRAVENOUS ONCE
Status: COMPLETED | OUTPATIENT
Start: 2024-03-12 | End: 2024-03-12

## 2024-03-12 RX ORDER — BUPIVACAINE HYDROCHLORIDE 5 MG/ML
20 INJECTION, SOLUTION PERINEURAL ONCE
Status: DISCONTINUED | OUTPATIENT
Start: 2024-03-12 | End: 2024-03-12 | Stop reason: ALTCHOICE

## 2024-03-12 RX ORDER — ONDANSETRON HYDROCHLORIDE 2 MG/ML
INJECTION, SOLUTION INTRAVENOUS AS NEEDED
Status: DISCONTINUED | OUTPATIENT
Start: 2024-03-12 | End: 2024-03-12

## 2024-03-12 RX ORDER — PROPOFOL 10 MG/ML
INJECTION, EMULSION INTRAVENOUS AS NEEDED
Status: DISCONTINUED | OUTPATIENT
Start: 2024-03-12 | End: 2024-03-12

## 2024-03-12 RX ORDER — GLYCOPYRROLATE 0.2 MG/ML
INJECTION INTRAMUSCULAR; INTRAVENOUS AS NEEDED
Status: DISCONTINUED | OUTPATIENT
Start: 2024-03-12 | End: 2024-03-12

## 2024-03-12 RX ORDER — BUPIVACAINE HCL/EPINEPHRINE 0.5-1:200K
VIAL (ML) INJECTION AS NEEDED
Status: DISCONTINUED | OUTPATIENT
Start: 2024-03-12 | End: 2024-03-12 | Stop reason: HOSPADM

## 2024-03-12 RX ORDER — SODIUM CHLORIDE, SODIUM LACTATE, POTASSIUM CHLORIDE, CALCIUM CHLORIDE 600; 310; 30; 20 MG/100ML; MG/100ML; MG/100ML; MG/100ML
100 INJECTION, SOLUTION INTRAVENOUS CONTINUOUS
Status: DISCONTINUED | OUTPATIENT
Start: 2024-03-12 | End: 2024-03-12 | Stop reason: HOSPADM

## 2024-03-12 RX ORDER — LABETALOL HYDROCHLORIDE 5 MG/ML
5 INJECTION, SOLUTION INTRAVENOUS ONCE AS NEEDED
Status: DISCONTINUED | OUTPATIENT
Start: 2024-03-12 | End: 2024-03-12 | Stop reason: HOSPADM

## 2024-03-12 RX ORDER — MEPERIDINE HYDROCHLORIDE 25 MG/ML
12.5 INJECTION INTRAMUSCULAR; INTRAVENOUS; SUBCUTANEOUS EVERY 10 MIN PRN
Status: DISCONTINUED | OUTPATIENT
Start: 2024-03-12 | End: 2024-03-12 | Stop reason: HOSPADM

## 2024-03-12 RX ORDER — MIDAZOLAM HYDROCHLORIDE 1 MG/ML
INJECTION, SOLUTION INTRAMUSCULAR; INTRAVENOUS AS NEEDED
Status: DISCONTINUED | OUTPATIENT
Start: 2024-03-12 | End: 2024-03-12

## 2024-03-12 RX ORDER — ONDANSETRON HYDROCHLORIDE 2 MG/ML
4 INJECTION, SOLUTION INTRAVENOUS EVERY 8 HOURS PRN
Status: DISCONTINUED | OUTPATIENT
Start: 2024-03-12 | End: 2024-03-12 | Stop reason: HOSPADM

## 2024-03-12 RX ORDER — LIDOCAINE HYDROCHLORIDE 10 MG/ML
INJECTION, SOLUTION EPIDURAL; INFILTRATION; INTRACAUDAL; PERINEURAL AS NEEDED
Status: DISCONTINUED | OUTPATIENT
Start: 2024-03-12 | End: 2024-03-12

## 2024-03-12 RX ORDER — BUPIVACAINE HYDROCHLORIDE 5 MG/ML
INJECTION, SOLUTION PERINEURAL AS NEEDED
Status: DISCONTINUED | OUTPATIENT
Start: 2024-03-12 | End: 2024-03-12

## 2024-03-12 RX ORDER — FENTANYL CITRATE 50 UG/ML
50 INJECTION, SOLUTION INTRAMUSCULAR; INTRAVENOUS EVERY 5 MIN PRN
Status: DISCONTINUED | OUTPATIENT
Start: 2024-03-12 | End: 2024-03-12 | Stop reason: HOSPADM

## 2024-03-12 RX ORDER — HYDRALAZINE HYDROCHLORIDE 20 MG/ML
5 INJECTION INTRAMUSCULAR; INTRAVENOUS EVERY 30 MIN PRN
Status: DISCONTINUED | OUTPATIENT
Start: 2024-03-12 | End: 2024-03-12 | Stop reason: HOSPADM

## 2024-03-12 RX ORDER — NALOXONE HYDROCHLORIDE 0.4 MG/ML
0.2 INJECTION, SOLUTION INTRAMUSCULAR; INTRAVENOUS; SUBCUTANEOUS EVERY 5 MIN PRN
Status: DISCONTINUED | OUTPATIENT
Start: 2024-03-12 | End: 2024-03-12 | Stop reason: HOSPADM

## 2024-03-12 RX ADMIN — PROPOFOL 50 MG: 10 INJECTION, EMULSION INTRAVENOUS at 11:10

## 2024-03-12 RX ADMIN — Medication 3 G: at 10:57

## 2024-03-12 RX ADMIN — SODIUM CHLORIDE, SODIUM LACTATE, POTASSIUM CHLORIDE, AND CALCIUM CHLORIDE 100 ML/HR: 600; 310; 30; 20 INJECTION, SOLUTION INTRAVENOUS at 08:57

## 2024-03-12 RX ADMIN — PROPOFOL 200 MG: 10 INJECTION, EMULSION INTRAVENOUS at 12:39

## 2024-03-12 RX ADMIN — PROPOFOL 100 MG: 10 INJECTION, EMULSION INTRAVENOUS at 11:28

## 2024-03-12 RX ADMIN — BUPIVACAINE HYDROCHLORIDE 25 ML: 5 INJECTION, SOLUTION PERINEURAL at 10:19

## 2024-03-12 RX ADMIN — FENTANYL CITRATE 50 MCG: 50 INJECTION INTRAMUSCULAR; INTRAVENOUS at 12:24

## 2024-03-12 RX ADMIN — TRANEXAMIC ACID 1000 MG: 1 INJECTION, SOLUTION INTRAVENOUS at 12:23

## 2024-03-12 RX ADMIN — ACETAMINOPHEN 650 MG: 325 TABLET ORAL at 14:36

## 2024-03-12 RX ADMIN — FENTANYL CITRATE 50 MCG: 50 INJECTION INTRAMUSCULAR; INTRAVENOUS at 11:08

## 2024-03-12 RX ADMIN — BUPIVACAINE HYDROCHLORIDE 10 ML: 2.5 INJECTION, SOLUTION INFILTRATION; PERINEURAL at 10:22

## 2024-03-12 RX ADMIN — BUPIVACAINE HYDROCHLORIDE IN DEXTROSE 1.2 ML: 7.5 INJECTION, SOLUTION SUBARACHNOID at 10:55

## 2024-03-12 RX ADMIN — MIDAZOLAM 2 MG: 1 INJECTION INTRAMUSCULAR; INTRAVENOUS at 10:14

## 2024-03-12 RX ADMIN — PROPOFOL 500 MG: 10 INJECTION, EMULSION INTRAVENOUS at 10:56

## 2024-03-12 RX ADMIN — MIDAZOLAM 2 MG: 1 INJECTION INTRAMUSCULAR; INTRAVENOUS at 10:47

## 2024-03-12 RX ADMIN — FENTANYL CITRATE 100 MCG: 50 INJECTION INTRAMUSCULAR; INTRAVENOUS at 10:14

## 2024-03-12 RX ADMIN — TRANEXAMIC ACID 1000 MG: 1 INJECTION, SOLUTION INTRAVENOUS at 11:00

## 2024-03-12 RX ADMIN — PROPOFOL 50 MG: 10 INJECTION, EMULSION INTRAVENOUS at 10:57

## 2024-03-12 RX ADMIN — LIDOCAINE HYDROCHLORIDE 5 ML: 10 INJECTION, SOLUTION EPIDURAL; INFILTRATION; INTRACAUDAL; PERINEURAL at 10:57

## 2024-03-12 RX ADMIN — SODIUM CHLORIDE, SODIUM LACTATE, POTASSIUM CHLORIDE, AND CALCIUM CHLORIDE: 600; 310; 30; 20 INJECTION, SOLUTION INTRAVENOUS at 12:23

## 2024-03-12 RX ADMIN — ONDANSETRON 4 MG: 2 INJECTION INTRAMUSCULAR; INTRAVENOUS at 12:28

## 2024-03-12 RX ADMIN — PROPOFOL 200 MG: 10 INJECTION, EMULSION INTRAVENOUS at 12:21

## 2024-03-12 RX ADMIN — GLYCOPYRROLATE 0.2 MG: 0.2 INJECTION INTRAMUSCULAR; INTRAVENOUS at 10:58

## 2024-03-12 RX ADMIN — DEXAMETHASONE SODIUM PHOSPHATE 10 MG: 10 INJECTION, SOLUTION INTRAMUSCULAR; INTRAVENOUS at 10:19

## 2024-03-12 RX ADMIN — PROPOFOL 500 MG: 10 INJECTION, EMULSION INTRAVENOUS at 11:40

## 2024-03-12 RX ADMIN — DEXAMETHASONE SODIUM PHOSPHATE 8 MG: 4 INJECTION, SOLUTION INTRAMUSCULAR; INTRAVENOUS at 12:28

## 2024-03-12 SDOH — HEALTH STABILITY: MENTAL HEALTH: CURRENT SMOKER: 0

## 2024-03-12 ASSESSMENT — PAIN SCALES - GENERAL
PAINLEVEL_OUTOF10: 0 - NO PAIN
PAINLEVEL_OUTOF10: 5 - MODERATE PAIN
PAINLEVEL_OUTOF10: 0 - NO PAIN
PAIN_LEVEL: 0
PAINLEVEL_OUTOF10: 0 - NO PAIN
PAINLEVEL_OUTOF10: 5 - MODERATE PAIN
PAINLEVEL_OUTOF10: 0 - NO PAIN
PAINLEVEL_OUTOF10: 7

## 2024-03-12 ASSESSMENT — ACTIVITIES OF DAILY LIVING (ADL)
ADL_ASSISTANCE: INDEPENDENT
LACK_OF_TRANSPORTATION: NO
ADLS_ADDRESSED: YES

## 2024-03-12 ASSESSMENT — PAIN - FUNCTIONAL ASSESSMENT
PAIN_FUNCTIONAL_ASSESSMENT: 0-10

## 2024-03-12 ASSESSMENT — PAIN DESCRIPTION - ORIENTATION: ORIENTATION: LEFT

## 2024-03-12 ASSESSMENT — COGNITIVE AND FUNCTIONAL STATUS - GENERAL
MOBILITY SCORE: 23
CLIMB 3 TO 5 STEPS WITH RAILING: A LITTLE

## 2024-03-12 ASSESSMENT — PAIN DESCRIPTION - LOCATION: LOCATION: KNEE

## 2024-03-12 ASSESSMENT — PAIN DESCRIPTION - DESCRIPTORS: DESCRIPTORS: ACHING

## 2024-03-12 ASSESSMENT — COLUMBIA-SUICIDE SEVERITY RATING SCALE - C-SSRS
2. HAVE YOU ACTUALLY HAD ANY THOUGHTS OF KILLING YOURSELF?: NO
6. HAVE YOU EVER DONE ANYTHING, STARTED TO DO ANYTHING, OR PREPARED TO DO ANYTHING TO END YOUR LIFE?: NO
1. IN THE PAST MONTH, HAVE YOU WISHED YOU WERE DEAD OR WISHED YOU COULD GO TO SLEEP AND NOT WAKE UP?: NO

## 2024-03-12 NOTE — PERIOPERATIVE NURSING NOTE
Pt ambulates to the bathroom before discharge with walker and gait steady.  Denies dizziness or lightheadedness.  Mother at bedside to collect belongings and take patient home.

## 2024-03-12 NOTE — PERIOPERATIVE NURSING NOTE
Pt alone in room dressed and sitting in recliner awaiting for family to return.  Pt voices no c/o or needs at this time.  Discharge instructions given to read.  Pt has passed PT.

## 2024-03-12 NOTE — BRIEF OP NOTE
Date: 3/12/2024  OR Location: [facility]    Name: Cristiana Huddleston, : 1965, Age: 58 y.o., MRN: [id number], Sex: female    Diagnosis  Pre-op Diagnosis     * Unilateral primary osteoarthritis, left knee [M17.12] Post-op Diagnosis     * Unilateral primary osteoarthritis, left knee [M17.12]     Procedures  Arthroplasty Total Knee  0055T - IN [facility] ORTHO CT/MRI      Surgeons      * Deepa Gusman - Primary    Resident/Fellow/Other Assistant:  Surgeon(s) and Role:    Procedure Summary  Anesthesia: Regional  ASA: III  Anesthesia Staff: Anesthesiologist: Jorge Copeland MD  CRNA: DON Prado-CRNA  C-AA: SUDHA Vega  Estimated Blood Loss: 30 mL  Intra-op Medications:   Administrations occurring from 1015 to 1330 on 24:   Medication Name Total Dose   BUPivacaine-EPINEPHrine (Marcaine w/EPI) 0.5 %-1:200,000 injection 30 mL   methylPREDNISolone acetate (DEPO-Medrol) injection 40 mg   BUPivacaine HCl (Marcaine) 0.5 % (5 mg/mL) injection 20 mL   ceFAZolin (Ancef) injection 500 mg 500 mg   lactated Ringer's infusion Cannot be calculated   ceFAZolin in 0.9% sodium chloride (Ancef) IVPB 3 g 3 g              Anesthesia Record               Intraprocedure I/O Totals          Intake    Tranexamic Acid 0.00 mL    The total shown is the total volume documented since Anesthesia Start was filed.    lactated Ringer's infusion 178.33 mL    ceFAZolin in 0.9% sodium chloride (Ancef) IVPB 3 g 100.00 mL    Total Intake 278.33 mL          Specimen: No specimens collected     Staff:   Circulator: Stephany Caraballo RN  Relief Circulator: Amalia Marie RN  Relief Scrub: Logan Sexton; Huy Wilcox  Scrub Person: Darío Reyes; Mary Lowery; Susanne Acosta    Findings: End-stage osteoarthritis. Varus deformity.       Complications:  None; patient tolerated the procedure well.     Disposition: PACU - hemodynamically stable.  Condition: stable  Specimens Collected: No specimens  collected  Attending Attestation: I was present and scrubbed for the entire procedure.    Deepa Gusman  Phone Number: [phone]

## 2024-03-12 NOTE — PROGRESS NOTES
TCC met with patient to discuss discharge plan.  58 yr old female admitted RR following left total knee replacement with Dr. Gusman.  Plan is home today with [facility] PT. SOC is 3/13/24.  Family to transport home.    03/12/24 7845   Discharge Planning   Living Arrangements Spouse/significant other   Support Systems Spouse/significant other;Family members   Assistance Needed mobility and transfers   Type of Residence Private residence   Number of Stairs to Enter Residence 3   Number of Stairs Within Residence 16   Do you have animals or pets at home? No   Who is requesting discharge planning? Provider   Home or Post Acute Services In home services   Type of Home Care Services Home PT   Patient expects to be discharged to: Home   Does the patient need discharge transport arranged? No   Financial Resource Strain   How hard is it for you to pay for the very basics like food, housing, medical care, and heating? Not hard   Housing Stability   In the last 12 months, was there a time when you were not able to pay the mortgage or rent on time? N   In the last 12 months, how many places have you lived? 1   In the last 12 months, was there a time when you did not have a steady place to sleep or slept in a shelter (including now)? N   Transportation Needs   In the past 12 months, has lack of transportation kept you from medical appointments or from getting medications? no   In the past 12 months, has lack of transportation kept you from meetings, work, or from getting things needed for daily living? No   Patient Choice   Provider Choice list and CMS website (https://medicare.gov/care-compare#search) for post-acute Quality and Resource Measure Data were provided and reviewed with: Patient   Patient / Family choosing to utilize agency / facility established prior to hospitalization Yes

## 2024-03-12 NOTE — ANESTHESIA PREPROCEDURE EVALUATION
Patient: Cristiana Huddleston    Procedure Information       Date/Time: 03/12/24 1015    Procedure: Arthroplasty Total Knee with Navigation ( [employer] ROBOTIC ASSIST ) (Left: Knee) - WireOver    Location: [facility] / [facility]    Surgeons: Deepa Gusman MD            Relevant Problems   Endocrine   (+) Obesity      Neuro/Psych   (+) Anxiety      Musculoskeletal   (+) Primary localized osteoarthrosis of right lower leg   (+) Unilateral primary osteoarthritis, left knee     Past Surgical History:   Procedure Laterality Date    APPENDECTOMY      DILATION AND CURETTAGE OF UTERUS      KNEE SURGERY Bilateral 09/05/2018    Knee Surgery; meniscal tears with repair arthroscopy    OTHER SURGICAL HISTORY  10/09/2020    Corneal lasik    OTHER SURGICAL HISTORY  12/27/2019    Dilation and curettage    OTHER SURGICAL HISTORY  12/27/2019    Breast augmentation    RETINAL DETACHMENT REPAIR W/ SCLERAL BUCKLE LE Right 12/19/2022         Clinical information reviewed:   Tobacco  Allergies  Meds   Med Hx  Surg Hx  OB Status  Fam Hx  Soc   Hx        NPO Detail:  NPO/Void Status  Date of Last Liquid: 03/11/24  Time of Last Liquid: 2300  Date of Last Solid: 03/11/24  Time of Last Solid: 1800  Time of Last Void: 0800         Physical Exam    Airway  Mallampati: IV  TM distance: >3 FB  Neck ROM: full     Cardiovascular   Comments: deferred   Dental    Pulmonary   Comments: deferred   Abdominal     Comments: deferred           Anesthesia Plan    History of general anesthesia?: yes  History of complications of general anesthesia?: no    ASA 3     regional and spinal     The patient is not a current smoker.    intravenous induction   Postoperative administration of opioids is intended.  Anesthetic plan and risks discussed with patient.    Plan discussed with CAA.

## 2024-03-12 NOTE — PERIOPERATIVE NURSING NOTE
Assumed care of pt. Pt alert, VS baseline. Assessment completed as documented. Spinal receded @ L5, +5 push/pull. Pt denies pain or PONV. Orders reviewed, xray called.

## 2024-03-12 NOTE — PROGRESS NOTES
"Orthopedic Postoperative Check     Subjective  Interval History: The patient has mild pain, The patient is awaiting PT, The patient is tolerating a diet, The patient has no nausea or vomiting, and The patient is moving feet and ankles freely. Pain well controlled on current regimen.     Objective  Vital signs in last 24 hours:  Temp:  [36.1 °C (97 °F)-36.6 °C (97.9 °F)] 36.4 °C (97.5 °F)  Heart Rate:  [] 98  Resp:  [14-17] 16  BP: (111-171)/(73-97) 147/97    Labs:  WBC   Date Value Ref Range Status   03/07/2024 6.8 4.4 - 11.3 x10*3/uL Final     WBC, Urine   Date Value Ref Range Status   10/21/2022 2 0 - 5 /HPF Final     Ketones, Urine   Date Value Ref Range Status   11/03/2023 NEGATIVE NEGATIVE mg/dL Final     Bicarbonate   Date Value Ref Range Status   03/07/2024 27 21 - 32 mmol/L Final     Urea Nitrogen   Date Value Ref Range Status   03/07/2024 15 6 - 23 mg/dL Final     Creatinine   Date Value Ref Range Status   03/07/2024 0.83 0.50 - 1.05 mg/dL Final     Glucose   Date Value Ref Range Status   03/07/2024 94 74 - 99 mg/dL Final     Glucose, Urine   Date Value Ref Range Status   11/03/2023 NEGATIVE NEGATIVE mg/dL Final     Calcium   Date Value Ref Range Status   03/07/2024 9.3 8.6 - 10.3 mg/dL Final     No results found for: \"PT\", \"PTT\", \"INR\"    Physical Exam  NAD  Dressing/Incision c/d/i  Firing TA/EHL/GS  SILT L4-S1 grossly  2+ DP  WWP    Drain: None    Assessment/Plan    58 y.o. female, s/p Procedure(s):  Arthroplasty Total Knee,  POD# 0 doing well.   Continue Physical Therapy and Mobilize  Weightbearing as tolerated   Follow-Up Labs  DVT Prophylaxis:  Aspirin 81 mg BID starting POD 0, continue for 6 weeks, Sequential compression devices, and CHASITY hose   Antibiotics: Perioperative cefazolin 2 doses (or until discharge). 7-day course of oral Cefadroxil 500 mg BID    Analgesia: wean to orals. Judicious use of opioids.   Ice to surgical site every 4 hours   Advance Diet  IS, Bowel regimen  Dispo " Planning  Outpatient Follow-Up: Dr. Gusman clinic at 2 weeks postoperatively     Deepa Gusman MD  Adult Reconstruction and Joint Replacement

## 2024-03-12 NOTE — OP NOTE
Operative Note      Cristiana Huddleston      3/12/2024      PREOPERATIVE DIAGNOSIS: Left Knee Osteoarthritis      POSTOPERATIVE DIAGNOSIS: Same      Procedure(s):  Arthroplasty Total Knee - Wound Class: Class I/ Clean - Incision Closure: Deep and Superficial Layers      Surgeon:   Deepa Gusman MD       First Assist:   EVANGELIST Wild Sebastián      Anesthesia Staff:   Anesthesiologist: Jorge Copeland MD  CRNA: Keke Jon APRN-CRNA  C-AA: SUDHA Vega      Anesthesia Type: Regional       Specimens:   No specimens collected      Estimated Blood Loss:   Minimal      Implants:   Implant Name Type Inv. Item Serial No.  Lot No. LRB No. Used Action   CEMENT, BONE SIMPLEX P W/TOBRA - [id number] Joint CEMENT, BONE SIMPLEX P W/TOBRA  [employer] [id number] Left 1 Implanted   CEMENT, BONE SIMPLEX P W/TOBRA - [id number] Joint CEMENT, BONE SIMPLEX P W/TOBRA  [employer] [id number] Left 1 Implanted   DOME, PATELLA, MEDIALIZED, 35MM - [id number] Joint Knee DOME, PATELLA, MEDIALIZED, 35MM  [employer] [id number] Left 1 Implanted   SIZE 4, ATTUNE FEMORAL CRUCIATE RETAINING, LEFT, CEMENTED     [id number] Left 1 Implanted   TIBAL BASE ATTUNE FB, SZ 4 BAILEY - [id number] Joint Knee TIBAL BASE ATTUNE FB, SZ 4 BAILEY  DEPU[employer] [id number] Left 1 Implanted   8.0MM ATTUNE CRUCIATE RETAINING FIXED BEARING TIBIAL INSERT, AOX, SIZE 4, LEFT, MEDIAL STABILIZED     [id number] Left 1 Implanted         Complications: None      Findings: End-stage osteoarthritis. Varus deformity.          Clinical History:   The patient presents with severe osteoarthritis of the knee.  The patient has failed conservative management including activity modification, anti-inflammatory medications, and injections. All options were discussed in detail with the patient and the patient has decided that they would like to proceed with total knee replacement after informed consent was obtained in the office and on the day of surgery.      Description of  procedure:   Informed consent was obtained. The operative site was marked. The patient was transferred to the operating room. A combined spinal epidural anesthetic and saphenous nerve block were performed. The patient received preoperative antibiotics and tranexamic acid.       The operative leg was scrubbed and prepped and draped in standard sterile fashion.  A surgical time out was performed confirming the operative site. An Esmarch bandage was used to exsanguinate the leg and the tourniquet was inflated to 300 mmHg.       A longitudinal incision approximately 15 cm in length was made centered over the patella. Soft tissue was dissected sharply down to the extensor mechanism. A medial parapatellar arthrotomy was performed to expose the knee joint. Soft tissue was elevated off the proximal medial tibia for exposure. Fat pad was excised. The patella was dislocated laterally.       Attention was first turned to the femur. The femoral canal was opened with a drill and an intramedullary corey was passed. A 3-degree valgus distal femoral cut relative to the anatomic axis was made without difficulty.     Attention was then turned to the tibia. The extramedullary guide was placed around the tibia. The tibial cutting block was then pinned for a 3 degree varus relative to mechanical axis cut with approximately 5 degrees of posterior slope. The block was then pinned to remove 3 mm of bone from the worn medial side. The tibial cut was made without difficulty.      The extension gap was then checked. I was happy with medial lateral balance and that I could bring the knee out into full extension with a 6 mm spacer in place. Attention was turned back to the femur. The gap  guide was placed and expanded by 5 mm to match extension gap. The sizer was pinned in place looking at rotation based on posterior condyles, trans-epicondylar axis and AP axis. The femur was sized and pins were drilled for the AP cutting block in 3-4  degrees of external rotation based on the epicondylar axis and anh's. The AP cutting block was pinned in place and the anterior cut as well as the posterior cut and chamfer cuts were made without difficulty. The AP cutting block was removed and all bone fragments were removed.  The femoral sulcus cut was performed.     A laminar  was placed on the lateral side of the knee. The remaining medial meniscus as well as osteophytes from the posterior aspect of the medial femoral condyle were removed. A second laminar  was placed on the medial side of the knee and remaining lateral meniscus as well as osteophytes from the posterior aspect of the lateral femoral condyle were excised. The PCL was preserved. The flexion gap balance was tested at this point. There was equal balance between flexion and extension as well as medial and lateral.      Attention was turned back to the tibia. The tibia was sized and a tibial trial was pinned in appropriate rotation. A trial femur was put in place. A cruciate retaining trial polyethylene component was placed, and the knee was taken through range of motion. The knee was stable through an arc of motion and patellar tracking was midline. The trial polyethylene component was removed. Femoral lug holes were drilled. Osteophytes in the femoral notch were removed with an osteotome. The tibial keel punch was performed. A medial downsizing osteotomy was performed using an osteotome with tibial tray in place.       Attention was then turned to the patella. A freehand patellar cut was made without difficulty. Lug holes were drilled for the patella and a trial patella was put in place. The knee was once again taken through range of motion and the knee was stable with excellent patellar tracking through range of motion.      All trial implants were removed. The posterior capsule was cauterized to minimize postoperative bleeding. A periarticular injection of Marcaine with  epinephrine was injected into the periarticular soft tissues. Bone ends were irrigated and dried. A femoral bone plug was placed.       Implants were cemented into place. A trial 7 mm polyethylene liner was put in place to compress implants in extension. The tourniquet was deflated at 70 minutes. Hemostasis was obtained with electrocautery. All excess cement was removed. The cement was allowed to harden. The knee was then taken through a range of motion and I was happy with the balance on the medial and lateral side of the knee through an arc of motion as well as the kinematic tracking.       The trial polyethylene was removed. The locking mechanism was irrigated removing all debris. Excess cement was removed. A final 8 mm medial stabilized polyethylene was put in place and locked into place. The locking mechanism was tested and was intact. The knee was taken through final range of motion to test for stability and patellar tracking. The PCL was recessed slightly from its femoral insertion to optimize femoral rollback. The knee was irrigated with dilute betadine solution and saline pulsatile lavage.      At this point we began our closure. Several #2 ethibonds followed by Zero Vicryls and then #1 stratafix were used for closure of the arthrotomy. Subcutaneous tissue was closed in layers with 0 Vicryl followed by 3-0 Vicryl and skin was closed with 3-0 stratafix and Dermabond. Sterile dressings including Mepilex and Rock Iniguez Dressing were applied. At the end of the case all needle and instrument counts were correct. There were no complications. The patient was transferred to the bed and then recovery room in stable condition. A surgical debrief was performed at the end of the procedure.      A 22 modifier is being added to this case for increased complexity secondary to patient BMI >35.  This led to increased surgical time, need for additional help in the operating room, and additional retractor use as compared to  a standard arthroplasty procedure.      Post-operative Plan:   The patient will be weight-bearing as tolerated. They will receive perioperative antibiotics and be started on aspirin for DVT prophylaxis with mechanical compression devices. They will be discharged from the hospital when pain is controlled and they have met all PT goals.      Attestation Statement:   I was present for and performed all critical portions of the procedure.         Deepa Gusman MD       Date: 3/12/2024  Time: 12:34 PM      This office note was transcribed with dictation software. ?Please excuse any typographical errors, program misunderstandings leading to inadvertent insertions or deletions of inappropriate wording, pronoun errors and other unintentional transcription errors not noticed on proof-reading.

## 2024-03-12 NOTE — INTERVAL H&P NOTE
History and Physical Update:  I have reviewed the history and physical dated 3/7/24. History and physical reviewed and relevant findings noted.     Patient examined to review pertinent physical findings: No significant changes.   Home medications reviewed: No significant changes.   Allergies reviewed: No significant changes.     ERAS (Enhanced recovery after surgery): yes, TKA     Deepa Gusman MD    Date: 3/12/2024  Time: 8:01 AM        Patient Active Problem List   Diagnosis    Elevated alkaline phosphatase level    Knee pain    Low back pain    Morbid obesity with body mass index (BMI) of 40.0 or higher (CMS/[facility])    Primary localized osteoarthrosis of right lower leg    Anxiety    Unilateral primary osteoarthritis, left knee    Poor sleep    Nail deformity       Current Outpatient Medications   Medication Instructions    acetaminophen (Tylenol) 500 mg tablet oral, Every 6 hours PRN    acetaminophen (TYLENOL) 1,000 mg, oral, Every 8 hours    aspirin 81 mg, oral, 2 times daily    cefadroxil (DURICEF) 500 mg, oral, 2 times daily    chlorhexidine (Peridex) 0.12 % solution 15 mL, Mouth/Throat, As needed    diphenhydrAMINE-acetaminophen (Tylenol PM Extra Strength)  mg per tablet oral, Nightly PRN    docusate sodium (COLACE) 100 mg, oral, 2 times daily    melatonin 10 mg capsule oral, Nightly PRN    meloxicam (MOBIC) 15 mg, oral, Daily    multivitamin (Daily Multi-Vitamin) tablet 1 tablet, oral, Daily    mupirocin (Bactroban) 2 % ointment Topical, 2 times daily    ondansetron (ZOFRAN) 4 mg, oral, Every 8 hours PRN    oxyCODONE (ROXICODONE) 5-10 mg, oral, Every 6 hours PRN    pantoprazole (PROTONIX) 40 mg, oral, Daily before breakfast, Do not crush, chew, or split.    potassium/magnesium (MAGNESIUM-POTASSIUM ORAL) oral, Daily    sennosides (SENOKOT) 8.6 mg, oral, Daily    traMADol (ULTRAM)  mg, oral, Every 6 hours PRN    traZODone (DESYREL) 100 mg, oral, Nightly    vit C/E/Zn/coppr/lutein/zeaxan  "(PRESERVISION AREDS-2 ORAL) 2 tablets, oral, Daily       Allergies   Allergen Reactions    Chicken Feathers Allergenic Extract Hives     Chicken Feathers: only where skin has been in contact with feathers       Lab Results   Component Value Date    WBC 6.8 03/07/2024    HGB 13.6 03/07/2024    HCT 42.6 03/07/2024    MCV 86 03/07/2024     03/07/2024     No results found for: \"INR\", \"PROTIME\"    Lab Results   Component Value Date    GLUCOSE 94 03/07/2024    CALCIUM 9.3 03/07/2024     03/07/2024    K 4.1 03/07/2024    CO2 27 03/07/2024     03/07/2024    BUN 15 03/07/2024    CREATININE 0.83 03/07/2024     No results found for: \"CKTOTAL\", \"CKMB\", \"CKMBINDEX\", \"TROPONINI\"  Lab Results   Component Value Date    HGBA1C 5.2 03/07/2024       No results found for: \"CRP\"  No results found for: \"SEDRATE\"    "

## 2024-03-12 NOTE — ANESTHESIA POSTPROCEDURE EVALUATION
Patient: Cristiana Huddleston    Procedure Summary       Date: 03/12/24 Room / Location: [facility] / [facility]    Anesthesia Start: 1040 Anesthesia Stop: 1304    Procedure: Arthroplasty Total Knee (Left: Knee) Diagnosis:       Unilateral primary osteoarthritis, left knee      (Unilateral primary osteoarthritis, left knee [M17.12])    Surgeons: Deepa Gusman MD Responsible Provider: Jorge Copeland MD    Anesthesia Type: regional, spinal ASA Status: 3            Anesthesia Type: regional, spinal    Vitals Value Taken Time   /97 03/12/24 1345   Temp 36.4 °C (97.5 °F) 03/12/24 1301   Pulse 98 03/12/24 1345   Resp 16 03/12/24 1345   SpO2 94 % 03/12/24 1331       Anesthesia Post Evaluation    Patient location during evaluation: PACU  Patient participation: complete - patient participated  Level of consciousness: awake and alert  Pain score: 0  Pain management: satisfactory to patient  Airway patency: patent  Two or more strategies used to mitigate risk of obstructive sleep apnea  Cardiovascular status: acceptable  Respiratory status: acceptable  Hydration status: acceptable  Postoperative Nausea and Vomiting: none  Comments: Spinal regressing expectantly.        There were no known notable events for this encounter.

## 2024-03-12 NOTE — ANESTHESIA PROCEDURE NOTES
Spinal Block    Patient location during procedure: OR  Start time: 3/12/2024 10:50 AM  End time: 3/12/2024 10:55 AM  Reason for block: primary anesthetic and at surgeon's request  Staffing  Performed: SUDHA   Authorized by: Jorge Copeland MD    Performed by: SUDHA Vega    Preanesthetic Checklist  Completed: patient identified, IV checked, site marked, risks and benefits discussed, surgical consent, monitors and equipment checked, pre-op evaluation, timeout performed and sterile techniques followed  Block Timeout  RN/Licensed healthcare professional reads aloud to the Anesthesia provider and entire team: Patient identity, procedure with side and site, patient position, and as applicable the availability of implants/special equipment/special requirements.  Patient on coagulant treatment: no  Timeout performed at: 3/12/2024 10:48 AM  Spinal Block  Patient position: sitting  Prep: Betadine  Sterility prep: cap, drape, gloves, hand hygiene and mask  Sedation level: light sedation  Patient monitoring: heart rate, continuous pulse oximetry and blood pressure  Approach: midline  Vertebral space: L3-4  Injection technique: single-shot  Needle  Needle type: pencil-point   Needle gauge: 25 G  Needle length: 3.5 in (3.5 inches)  Free flowing CSF: yes    Assessment  Sensory level: T6  Block outcome: patient comfortable  Procedure assessment: patient sedated but conversant throughout procedure  Additional Notes  Patient tolerated procedure well with no immediate complications.      3 ml of local placed 1st (1% Lidocaine).    Medication dosage:   1.2 ml of 0.75 % Bupivacaine with 8.25% Dextrose.    Lot #:  [id number]  Expiration date:  12/31/25

## 2024-03-12 NOTE — PROGRESS NOTES
Physical Therapy    Physical Therapy Evaluation & Treatment    Patient Name: Cristiana Huddleston  MRN: [id number]  Today's Date: 3/12/2024   Time Calculation  Start Time: 1533  Stop Time: 1627  Time Calculation (min): 54 min    Assessment/Plan   PT Assessment  PT Assessment Results: Decreased strength, Decreased range of motion, Decreased mobility, Decreased safety awareness, Orthopedic restrictions, Pain  Rehab Prognosis: Excellent  Evaluation/Treatment Tolerance: Patient tolerated treatment well  Strengths: Ability to acquire knowledge, Attitude of self, Capable of completing ADLs semi/independent, Insight into problems, Support of Caregivers, Rehab experience  End of Session Communication: Bedside nurse  Assessment Comment: Pt low complexity eval presenting with impaired sensation, increased pain, and deficits to functional mobility following L TKA performed on 3/12/2024. Education regarding TKA precautions provided; pt verbalized understanding. Therapeutic exercises performed; HEP handout provided. Pt overall with minimal deficits to functional mobility; close<>distant supervision for functional transfers and ambulation, CGA for stair negotiation. Pt mildly impulsive throughout session requiring cues from PT to slow down and wait for PT instruction to perform functional mobility. RN notified of pt presentation. PT recommends DC home with HHPT and assist as needed.    End of Session Patient Position: Up in chair with BLE elevated and ice to surgical site. Call light left in reach; patient instructed not to get up on own. RN notified.     IP OR SWING BED PT PLAN  Inpatient or Swing Bed: Inpatient  PT Plan  Treatment/Interventions: Bed mobility, Transfer training, Gait training, Stair training, Strengthening, Endurance training, Range of motion, Therapeutic exercise, Therapeutic activity, Home exercise program, Positioning  PT Plan: Skilled PT  PT Frequency: BID  PT Discharge Recommendations: Low intensity level of  continued care  Equipment Recommended upon Discharge: Wheeled walker, Straight cane  PT Recommended Transfer Status: Independent, Assistive device  PT - OK to Discharge: Yes      Subjective     General Visit Information:  General  Reason for Referral: L TKA (3/12/2024)  Referred By: Dr. Gusman  Past Medical History Relevant to Rehab: OA, anxiety, LBP  Family/Caregiver Present: No  Prior to Session Communication: Bedside nurse  Patient Position Received: Bed, 3 rail up  General Comment: Session cleared by RN. Pt very pleasant and agreeable to PT evaluation.    Home Living:  Home Living  Type of Home: House  Lives With: Spouse (pt reports her  has cognitive and behavioral issues so her sister and mother will be providing supervision and assist as needed upon DC)  Home Adaptive Equipment: Walker rolling or standard, Cane, Crutches  Home Layout: Two level, Stairs to alternate level with rails  Alternate Level Stairs-Rails: Left  Alternate Level Stairs-Number of Steps: 8+8  Home Access: Stairs to enter with rails  Entrance Stairs-Rails: Right  Entrance Stairs-Number of Steps: 3  Bathroom Shower/Tub: Tub/shower unit  Bathroom Equipment: Grab bars in shower, Tub transfer bench  Prior Level of Function:  Prior Function Per Pt/Caregiver Report  Level of [place]: Independent with ADLs and functional transfers, Independent with homemaking with ambulation  ADL Assistance: Independent  Homemaking Assistance: Independent  Ambulatory Assistance: Needs assistance  Gait: Assistive device (pt reports using cane out in community)  Vocational: Full time employment, Works at home  Leisure: yoga  Prior Function Comments: Pt reports one fall in October when tripping up concrete steps that resulted in injury to her L knee; has been participating in OP PT prior to sx.  Precautions:  Precautions  LE Weight Bearing Status: Weight Bearing as Tolerated  Post-Surgical Precautions: Left total knee precautions    Objective    Pain:  Pain Assessment  Pain Assessment: 0-10  Pain Score: 5 - Moderate pain  Pain Type: Surgical pain  Pain Location: Knee  Pain Orientation: Left  Pain Interventions: Cold applied, Ambulation/increased activity, Elevated  Cognition:  Cognition  Overall Cognitive Status: Within Functional Limits  Attention: Within Functional Limits  Memory: Within Funtional Limits  Problem Solving: Within Functional Limits  Safety/Judgement: Within Functional Limits  Impulsive: Mildly (pt required cues from PT to slow down during functional mobility and wait for PT instruction; demonstrated understanding)    General Assessments:  Activity Tolerance  Endurance: Endurance does not limit participation in activity    Sensation  Light Touch: Partial deficits in the RLE, Partial deficits in the LLE (pt reporting glutes numb)  Proprioception: No apparent deficits    Coordination  Movements are Fluid and Coordinated: Yes    Postural Control  Postural Control: Within Functional Limits    Static Sitting Balance  Static Sitting-Balance Support: Feet supported, Bilateral upper extremity supported  Static Sitting-Level of Assistance: Independent  Dynamic Sitting Balance  Dynamic Sitting-Balance Support: Feet supported  Dynamic Sitting-Comments: distant sup seated EOB during LE dressing    Static Standing Balance  Static Standing-Balance Support: Bilateral upper extremity supported  Static Standing-Level of Assistance: Distant supervision  Static Standing-Comment/Number of Minutes: with rolling walker  Dynamic Standing Balance  Dynamic Standing-Balance Support: Bilateral upper extremity supported  Dynamic Standing-Balance: Forward lean, Reaching across midline  Dynamic Standing-Comments: with rolling walker  Functional Assessments:  ADL  ADL's Addressed: Yes  LE Dressing Assistance: Stand by  LE Dressing Deficit: Verbal cueing, Supervision/safety, Increased time to complete (PT provided verbal cues to dress surgical limb first seated EOB; pt  "demonstrated understanding)  Functional Assistance: Stand by  Functional Deficit: Verbal cueing, Supervision/safety, Commode transfer (Pt provided cues for sequencing of movement and R grab bar use; pt demonstrated understanding)    Bed Mobility  Bed Mobility: Yes  Bed Mobility 1  Bed Mobility 1: Supine to sitting, Sitting to supine  Level of Assistance 1: Independent    Transfers  Transfer: Yes  Transfer 1  Transfer From 1: Bed to, Stand to  Transfer to 1: Stand, Toilet, Chair with arms  Technique 1: Sit to stand, Stand to sit  Transfer Device 1: Walker  Transfer Level of Assistance 1: Close supervision, Minimal verbal cues (verbal cues required throughout session to ensure BUE support on transfer surface and for safe walker approximation; pt demonstrated understanding with repetition)  Trials/Comments 1: x4    Ambulation/Gait Training  Ambulation/Gait Training Performed: Yes  Ambulation/Gait Training 1  Surface 1: Level tile  Device 1: Rolling walker  Assistance 1: Distant supervision  Quality of Gait 1: Decreased step length, Antalgic (good bushra; reciprocal pattern; no LOB or knee buckle)  Comments/Distance (ft) 1: 175 feet x2    Stairs  Stairs: Yes  Stairs  Rails 1: Right  Device 1: Railing, Single point cane  Assistance 1: Contact guard, Minimal verbal cues (pt required repeated instruction on correct sequencing of surgical limb + straight cane)  Comment/Number of Steps 1: six 6\" steps completed via step to pattern with straight cane in L hand  Stairs 2  Rails 2: Left  Device 2: Railing, Single point cane  Assistance 2: Contact guard, Minimal verbal cues (pt demonstrated understanding of correct sequencing of surgical limb + cane; completed via step to pattern)  Comment/Number of Steps 2: three 6\" steps  Extremity/Trunk Assessments:  RUE   RUE : Within Functional Limits  LUE   LUE: Within Functional Limits  RLE   RLE : Within Functional Limits  LLE   LLE : Exceptions to WFL  AROM LLE (degrees)  LLE AROM " Comment: L knee flexion >90 degrees in supine; full knee extension in supine and standing  Strength LLE  L Knee Flexion: 4/5  L Knee Extension: 4+/5  Treatments:  Therapeutic Exercise  Therapeutic Exercise Performed: Yes B ankle pumps, L quad sets, L gluteal sets, L heel slides, L SAQ, L hip abduction, and L SLR x 10 reps each.    Outcome Measures:  [facility] Basic Mobility  Turning from your back to your side while in a flat bed without using bedrails: None  Moving from lying on your back to sitting on the side of a flat bed without using bedrails: None  Moving to and from bed to chair (including a wheelchair): None  Standing up from a chair using your arms (e.g. wheelchair or bedside chair): None  To walk in hospital room: None  Climbing 3-5 steps with railing: A little  Basic Mobility - Total Score: 23    Encounter Problems       Encounter Problems (Active)       Compromised Skin Integrity       LTG - Patient will be free from infection (Progressing)       Start:  03/12/24               PT Transfers       LTG - Patient will demonstrate safe transfer techniques (Progressing)       Start:  03/12/24               Pain          Safety       LTG - Patient will demonstrate safety requirements appropriate to situation/environment (Progressing)       Start:  03/12/24                  Encounter Problems (Resolved)       Balance       LTG - Patient will maintain standing and sitting balance to allow for completion of daily activities (Met)       Start:  03/12/24    Resolved:  03/12/24            Mobility       LTG - Patient will ambulate community distance (Met)       Start:  03/12/24    Resolved:  03/12/24         LTG - Patient will navigate 4-6 steps with rails/device (Met)       Start:  03/12/24    Resolved:  03/12/24                Education Documentation  Handouts, taught by Afsaneh Tinajero, PT at 3/12/2024  6:31 PM.  Learner: Patient  Readiness: Eager  Method: Explanation, Demonstration, Handout  Response: Verbalizes  Understanding, Demonstrated Understanding    Precautions, taught by Afsaneh Tinajero PT at 3/12/2024  6:31 PM.  Learner: Patient  Readiness: Eager  Method: Explanation, Demonstration, Handout  Response: Verbalizes Understanding, Demonstrated Understanding    Body Mechanics, taught by Afsaneh Tinajero PT at 3/12/2024  6:31 PM.  Learner: Patient  Readiness: Eager  Method: Explanation, Demonstration, Handout  Response: Verbalizes Understanding, Demonstrated Understanding    Home Exercise Program, taught by Afsaneh Tinajero PT at 3/12/2024  6:31 PM.  Learner: Patient  Readiness: Eager  Method: Explanation, Demonstration, Handout  Response: Verbalizes Understanding, Demonstrated Understanding    Mobility Training, taught by Afsaneh Tinajero PT at 3/12/2024  6:31 PM.  Learner: Patient  Readiness: Eager  Method: Explanation, Demonstration, Handout  Response: Verbalizes Understanding, Demonstrated Understanding    Education Comments  No comments found.      Afsaneh Tinajero PT, DPT

## 2024-03-12 NOTE — PERIOPERATIVE NURSING NOTE
Still awaiting pt family.  Pt states she got her meds to beds and read over her instructions and has no questions.  Voices no c/o pain.  Ice machine connections tightened and now pt is feeling the cold water coming through.

## 2024-03-12 NOTE — ANESTHESIA PROCEDURE NOTES
Peripheral Block    Patient location during procedure: pre-op  Start time: 3/12/2024 10:19 AM  End time: 3/12/2024 10:21 AM  Reason for block: at surgeon's request and post-op pain management  Staffing  Performed: attending   Authorized by: Jorge Copeland MD    Performed by: Jorge Copeland MD  Preanesthetic Checklist  Completed: patient identified, IV checked, site marked, risks and benefits discussed, surgical consent, monitors and equipment checked, pre-op evaluation and timeout performed   Timeout performed at: 3/12/2024 10:13 AM  Peripheral Block  Patient position: laying flat  Prep: alcohol swabs, ChloraPrep and site prepped and draped  Patient monitoring: heart rate, cardiac monitor and continuous pulse ox  Block type: adductor canal  Injection technique: single-shot  Guidance: nerve stimulator and ultrasound guided  Needle  Needle gauge: 21 G  Needle length: 10 cm  Needle localization: ultrasound guidance     image stored in chart  Needle insertion depth: 8 cm  Test dose: negative  Assessment  Injection assessment: negative aspiration for heme, no paresthesia on injection, local visualized surrounding nerve on ultrasound and incremental injection  Paresthesia pain: immediately resolved  Heart rate change: no  Slow fractionated injection: yes  Additional Notes  Dexamethasone 10mg added to local anesthetic.

## 2024-03-12 NOTE — PROGRESS NOTES
Medication Education     Medication education for Cristiana Huddleston was provided to the patient  for the following medication(s):  Aspirin  Cefadroxil  Docusate  Meloxicam  Zofran  Oxycodone  Tylenol  Pantoprazole  Senna  Tramadol    Medication education provided by a Pharmacist:  -Proper dose, indication, possible ADRs   -How the medication works and benefits of taking it  -Importance of compliance   -Potential duration of therapy    Identified potential barriers to education:  None    Method(s) of Education:  Verbal Written materials provided and reviewed    An opportunity to ask questions and receive answers was provided.     Assessment of understanding the patient :  2= meets goals/outcomes    Additional Notes (if applicable): Meds to beds given to patient.    Janette Mcclelland, MikieD

## 2024-03-12 NOTE — CARE PLAN
Problem: Safety  Goal: LTG - Patient will demonstrate safety requirements appropriate to situation/environment  Outcome: Progressing     Problem: PT Transfers  Goal: LTG - Patient will demonstrate safe transfer techniques  Outcome: Progressing     Problem: Pain  Goal: LTG - Patient will manage pain with the appropriate technique/Intervention  Outcome: Progressing     Problem: Compromised Skin Integrity  Goal: LTG - Patient will be free from infection  Outcome: Progressing     Problem: Balance  Goal: LTG - Patient will maintain standing and sitting balance to allow for completion of daily activities  Outcome: Met     Problem: Mobility  Goal: LTG - Patient will ambulate community distance  Outcome: Met  Goal: LTG - Patient will navigate 4-6 steps with rails/device  Outcome: Met

## 2024-03-12 NOTE — HH CARE COORDINATION
Home Care received a Referral for Physical Therapy and Occupational Therapy. We have processed the referral for a Start of Care on 3/13/2024.     If you have any questions or concerns, please feel free to contact us at [phone]. Follow the prompts, enter your five digit zip code, and you will be directed to your care team on [facility].

## 2024-03-12 NOTE — PERIOPERATIVE NURSING NOTE
"Patient to rapid recovery room 214. Moved to bed with minimal assistance. Patient explained plan of care for day. Ordered lunch and tolerating food with no issues of n/v. Patient with complaints of \"mild pain\" and requested tylenol for pain.   "

## 2024-03-13 ENCOUNTER — HOME CARE VISIT (OUTPATIENT)
Dept: HOME HEALTH SERVICES | Facility: HOME HEALTH | Age: 59
End: 2024-03-13
Payer: COMMERCIAL

## 2024-03-13 PROCEDURE — G0151 HHCP-SERV OF PT,EA 15 MIN: HCPCS

## 2024-03-13 PROCEDURE — 0023 HH SOC

## 2024-03-14 ENCOUNTER — HOME CARE VISIT (OUTPATIENT)
Dept: HOME HEALTH SERVICES | Facility: HOME HEALTH | Age: 59
End: 2024-03-14
Payer: COMMERCIAL

## 2024-03-14 ENCOUNTER — TELEPHONE (OUTPATIENT)
Dept: PRIMARY CARE | Facility: CLINIC | Age: 59
End: 2024-03-14
Payer: COMMERCIAL

## 2024-03-14 DIAGNOSIS — Z72.820 POOR SLEEP: ICD-10-CM

## 2024-03-14 DIAGNOSIS — F41.9 ANXIETY: ICD-10-CM

## 2024-03-14 PROCEDURE — G0152 HHCP-SERV OF OT,EA 15 MIN: HCPCS

## 2024-03-14 ASSESSMENT — ENCOUNTER SYMPTOMS
PAIN LOCATION - PAIN QUALITY: ACHE
PAIN: 1
PAIN LOCATION: LEFT KNEE
SUBJECTIVE PAIN PROGRESSION: WAXING AND WANING
PAIN SEVERITY GOAL: 2/10
PAIN LOCATION - RELIEVING FACTORS: MOBILITY
PAIN LOCATION - PAIN FREQUENCY: CONSTANT
HIGHEST PAIN SEVERITY IN PAST 24 HOURS: 8/10
PAIN LOCATION - EXACERBATING FACTORS: SLEEPING AT NIGHT
LOWEST PAIN SEVERITY IN PAST 24 HOURS: 6/10
PAIN LOCATION - PAIN SEVERITY: 6/10
PAIN LOCATION - PAIN DURATION: CONSTANT

## 2024-03-14 ASSESSMENT — ACTIVITIES OF DAILY LIVING (ADL)
WASHING_UPB_CURRENT_FUNCTION: SUPERVISION
BATHING ASSESSED: 1
DRESSING_LB_CURRENT_FUNCTION: INDEPENDENT
BATHING_CURRENT_FUNCTION: SUPERVISION
WASHING_LB_CURRENT_FUNCTION: SUPERVISION
DRESSING_UB_CURRENT_FUNCTION: INDEPENDENT

## 2024-03-15 ENCOUNTER — APPOINTMENT (OUTPATIENT)
Dept: SPORTS MEDICINE | Facility: HOSPITAL | Age: 59
End: 2024-03-15
Payer: COMMERCIAL

## 2024-03-15 RX ORDER — TRAZODONE HYDROCHLORIDE 50 MG/1
100 TABLET ORAL NIGHTLY
Qty: 180 TABLET | Refills: 1 | Status: SHIPPED | OUTPATIENT
Start: 2024-03-15 | End: 2024-09-11

## 2024-03-15 SDOH — HEALTH STABILITY: PHYSICAL HEALTH: EXERCISE TYPE: ISSUED HEP FOR TKA SUPINE EXERCISES

## 2024-03-15 SDOH — ECONOMIC STABILITY: HOUSING INSECURITY: HOME SAFETY: FALL PRECAUTIONS

## 2024-03-15 ASSESSMENT — ACTIVITIES OF DAILY LIVING (ADL)
OASIS_M1830: 02
ENTERING_EXITING_HOME: MINIMUM ASSIST

## 2024-03-15 ASSESSMENT — ENCOUNTER SYMPTOMS
OCCASIONAL FEELINGS OF UNSTEADINESS: 1
PAIN LOCATION: LEFT KNEE
PAIN: 1
PERSON REPORTING PAIN: PATIENT

## 2024-03-16 ENCOUNTER — HOME CARE VISIT (OUTPATIENT)
Dept: HOME HEALTH SERVICES | Facility: HOME HEALTH | Age: 59
End: 2024-03-16
Payer: COMMERCIAL

## 2024-03-16 PROCEDURE — G0151 HHCP-SERV OF PT,EA 15 MIN: HCPCS

## 2024-03-18 ENCOUNTER — HOME CARE VISIT (OUTPATIENT)
Dept: HOME HEALTH SERVICES | Facility: HOME HEALTH | Age: 59
End: 2024-03-18
Payer: COMMERCIAL

## 2024-03-18 PROCEDURE — G0151 HHCP-SERV OF PT,EA 15 MIN: HCPCS

## 2024-03-18 ASSESSMENT — ENCOUNTER SYMPTOMS
PERSON REPORTING PAIN: PATIENT
PAIN: 1
MUSCLE WEAKNESS: 1
LIMITED RANGE OF MOTION: 1
PAIN LOCATION: LEFT KNEE

## 2024-03-19 NOTE — SIGNIFICANT EVENT
Thank you for taking my call today regarding your recent joint replacement surgery with Dr. Deepa Gusman.      We discussed that: Home Health Care services (physical and/or occupational therapy) have been initiated Your pain is Controlled on the current regimen Will fluctuate throughout recovery with increased activity You are able to tolerate regular activity and exercises The importance of continued cold therapy throughout recovery The importance of following the prescribed precautions by your surgeon You have had a bowel movement The importance of continuing blood thinner as prescribed The importance of wearing compression stockings as prescribed    You indicated that all of your questions have been answered at the time of our call.    Please don't hesitate to reach out if you have any additional questions or concerns.    Rosey Mcgee MBA, BSN, RN-BC  PAPA MartinezN, RN  [facility]  [facility] [phone]

## 2024-03-20 ENCOUNTER — APPOINTMENT (OUTPATIENT)
Dept: RADIOLOGY | Facility: CLINIC | Age: 59
End: 2024-03-20
Payer: COMMERCIAL

## 2024-03-21 ENCOUNTER — HOME CARE VISIT (OUTPATIENT)
Dept: HOME HEALTH SERVICES | Facility: HOME HEALTH | Age: 59
End: 2024-03-21
Payer: COMMERCIAL

## 2024-03-21 PROCEDURE — G0151 HHCP-SERV OF PT,EA 15 MIN: HCPCS

## 2024-03-23 ASSESSMENT — ENCOUNTER SYMPTOMS
PERSON REPORTING PAIN: PATIENT
MUSCLE WEAKNESS: 1
OCCASIONAL FEELINGS OF UNSTEADINESS: 0
PAIN: 1
LIMITED RANGE OF MOTION: 1

## 2024-03-23 ASSESSMENT — ACTIVITIES OF DAILY LIVING (ADL)
OASIS_M1830: 00
HOME_HEALTH_OASIS: 00

## 2024-03-25 ENCOUNTER — OFFICE VISIT (OUTPATIENT)
Dept: ORTHOPEDIC SURGERY | Facility: CLINIC | Age: 59
End: 2024-03-25
Payer: COMMERCIAL

## 2024-03-25 DIAGNOSIS — M17.12 PRIMARY OSTEOARTHRITIS OF LEFT KNEE: Primary | ICD-10-CM

## 2024-03-25 DIAGNOSIS — Z96.651 S/P TOTAL KNEE ARTHROPLASTY, RIGHT: ICD-10-CM

## 2024-03-25 PROCEDURE — 1036F TOBACCO NON-USER: CPT | Performed by: STUDENT IN AN ORGANIZED HEALTH CARE EDUCATION/TRAINING PROGRAM

## 2024-03-25 PROCEDURE — 99024 POSTOP FOLLOW-UP VISIT: CPT | Performed by: STUDENT IN AN ORGANIZED HEALTH CARE EDUCATION/TRAINING PROGRAM

## 2024-03-25 ASSESSMENT — PAIN DESCRIPTION - DESCRIPTORS: DESCRIPTORS: ACHING;SORE

## 2024-03-25 ASSESSMENT — PAIN - FUNCTIONAL ASSESSMENT: PAIN_FUNCTIONAL_ASSESSMENT: 0-10

## 2024-03-25 ASSESSMENT — PAIN SCALES - GENERAL: PAINLEVEL_OUTOF10: 8

## 2024-04-02 NOTE — PROGRESS NOTES
" Depea Gusman MD   Adult Reconstruction and Joint Replacement Surgery  Phone: [phone]     Fax: [phone]     FOLLOW UP      Name: Cristiana Huddleston  : 1965  Date of Visit: 24    Procedure: Left Total knee replacement  Date: 3/12/2024    Chief Complaint: Left Total knee replacement surgery follow-up    History of Present Illness:  The patient is now 5 weeks 6 days out from left Total knee replacement surgery.     The patient has mild or occasional pain.    Currently taking tylenol for pain.     The patient has low stiffness.     Patient is walking with nothing for assistive device.    The patient goes up and down stairs normally.    Patient can walk 2-3 blocks.    The patient is doing outpatient physical therapy.    No fevers or drainage from the incision.    There are no concerns.    The patient is mildly limited.     Physical Exam:  The patient is well appearing, alert and oriented to person place and time.    The incision is well healed. There is no sign of wound complication.    Range of Motion: full extension to  125  degrees of flexion.    There is no extensor lag.    There is a small effusion in the knee.    There is no instability. The knee is stable to varus-valgus stress and anterior-posterior stress.     Homans sign is negative.    Neurologic, and vascular examinations are normal.    PROMs   24   \"KOOS, JR. Score\" 42.28       Imaging:    X-rays were personally reviewed today and show implants in good position with no evidence of complication.    Impression and Plan:    58 y.o. female 5 weeks 6 days from left Total knee replacement.    The patient is doing well following Total knee replacement surgery.  Antibiotic prophylaxis prior to dental procedures were reviewed.  Long term failure mechanisms were reviewed. Discussed importance of long term follow up. A new physical therapy prescription was given to the patient, and we reviewed exercises to be performed at home to work " on improving range of motion and strength. The patient was asked to contact the office sooner if there are any concerns. Their questions were answered.     RTC: 3 months     X-rays at next visit: Postop TKA series    _____________________  Deepa Gusman MD  [facility]

## 2024-04-03 PROBLEM — M17.11 UNILATERAL PRIMARY OSTEOARTHRITIS, RIGHT KNEE: Status: ACTIVE | Noted: 2024-05-14

## 2024-04-05 ENCOUNTER — APPOINTMENT (OUTPATIENT)
Dept: RADIOLOGY | Facility: CLINIC | Age: 59
End: 2024-04-05
Payer: COMMERCIAL

## 2024-04-08 DIAGNOSIS — M17.12 UNILATERAL PRIMARY OSTEOARTHRITIS, LEFT KNEE: ICD-10-CM

## 2024-04-09 PROCEDURE — RXMED WILLOW AMBULATORY MEDICATION CHARGE

## 2024-04-09 RX ORDER — PANTOPRAZOLE SODIUM 40 MG/1
40 TABLET, DELAYED RELEASE ORAL
Qty: 30 TABLET | Refills: 0 | Status: SHIPPED | OUTPATIENT
Start: 2024-04-09 | End: 2024-05-06 | Stop reason: SDUPTHER

## 2024-04-11 ENCOUNTER — PHARMACY VISIT (OUTPATIENT)
Dept: PHARMACY | Facility: CLINIC | Age: 59
End: 2024-04-11
Payer: COMMERCIAL

## 2024-04-11 DIAGNOSIS — M17.12 UNILATERAL PRIMARY OSTEOARTHRITIS, LEFT KNEE: ICD-10-CM

## 2024-04-12 ENCOUNTER — OFFICE VISIT (OUTPATIENT)
Dept: PRIMARY CARE | Facility: CLINIC | Age: 59
End: 2024-04-12
Payer: COMMERCIAL

## 2024-04-12 VITALS
WEIGHT: 265 LBS | BODY MASS INDEX: 38.02 KG/M2 | HEART RATE: 80 BPM | DIASTOLIC BLOOD PRESSURE: 88 MMHG | SYSTOLIC BLOOD PRESSURE: 146 MMHG

## 2024-04-12 DIAGNOSIS — Z01.818 PREOPERATIVE CLEARANCE: Primary | ICD-10-CM

## 2024-04-12 DIAGNOSIS — M17.11 UNILATERAL PRIMARY OSTEOARTHRITIS, RIGHT KNEE: ICD-10-CM

## 2024-04-12 PROBLEM — M17.12 UNILATERAL PRIMARY OSTEOARTHRITIS, LEFT KNEE: Status: RESOLVED | Noted: 2024-03-12 | Resolved: 2024-04-12

## 2024-04-12 PROCEDURE — 99213 OFFICE O/P EST LOW 20 MIN: CPT | Performed by: INTERNAL MEDICINE

## 2024-04-12 PROCEDURE — RXMED WILLOW AMBULATORY MEDICATION CHARGE

## 2024-04-12 PROCEDURE — 1036F TOBACCO NON-USER: CPT | Performed by: INTERNAL MEDICINE

## 2024-04-12 RX ORDER — NAPROXEN SODIUM 220 MG/1
81 TABLET, FILM COATED ORAL 2 TIMES DAILY
Qty: 84 TABLET | Refills: 0 | Status: SHIPPED | OUTPATIENT
Start: 2024-04-12 | End: 2024-05-14 | Stop reason: HOSPADM

## 2024-04-12 ASSESSMENT — ENCOUNTER SYMPTOMS
NAUSEA: 0
DIARRHEA: 0
DIFFICULTY URINATING: 0
CHILLS: 0
COUGH: 0
BACK PAIN: 0
FEVER: 0
SHORTNESS OF BREATH: 0
ACTIVITY CHANGE: 0
VOMITING: 0
PALPITATIONS: 0
WHEEZING: 0
DYSURIA: 0
CONSTIPATION: 0
DIZZINESS: 0
APPETITE CHANGE: 0
DIAPHORESIS: 0
ARTHRALGIAS: 1
ABDOMINAL PAIN: 0
LIGHT-HEADEDNESS: 0
FATIGUE: 0
HEADACHES: 0
UNEXPECTED WEIGHT CHANGE: 0
CHEST TIGHTNESS: 0

## 2024-04-12 NOTE — PATIENT INSTRUCTIONS
Follow up with ortho as directed and PT regarding the left knee replacement  Get preoperative labs done and xrays prior to right total knee replacement  Stop vitamins and meloxicam 1 week prior to your surgery (if ortho wants you off longer please listen to them)  Follow up here as directed

## 2024-04-12 NOTE — PROGRESS NOTES
Subjective   Patient ID: Cristiana Huddleston is a 58 y.o. female who presents for Knee Pain (Having right knee replacement 5/14/24).    Knee Pain       She is having right TKR on May 14th by Dr. Gusman.  She is here for preoperative clearance.  She will have additional xrays prior to surgery.  She has labs ordered on 5/6/2024.       She had the left TKR on March 12th and she is feeling much better in terms of her gait, pain in other areas (hip/low back).  She has great amount of ROM.  She continues in PT for this knee and will continue after the other knee gets done.  She is on Tylenol PM to help with pain at night and using Meloxicam.      Review of Systems   Constitutional:  Negative for activity change, appetite change, chills, diaphoresis, fatigue, fever and unexpected weight change.   Respiratory:  Negative for cough, chest tightness, shortness of breath and wheezing.    Cardiovascular:  Negative for chest pain, palpitations and leg swelling.   Gastrointestinal:  Negative for abdominal pain, constipation, diarrhea, nausea and vomiting.   Genitourinary:  Negative for difficulty urinating and dysuria.   Musculoskeletal:  Positive for arthralgias. Negative for back pain.   Neurological:  Negative for dizziness, light-headedness and headaches.       Objective   /88 (BP Location: Left arm, Patient Position: Sitting)   Pulse 80   Wt 120 kg (265 lb)   BMI 38.02 kg/m²    Physical Exam  Constitutional:       Appearance: Normal appearance. She is obese.   Neck:      Vascular: No carotid bruit.   Cardiovascular:      Rate and Rhythm: Normal rate and regular rhythm.      Heart sounds: Normal heart sounds.   Pulmonary:      Effort: Pulmonary effort is normal. No respiratory distress.      Breath sounds: Normal breath sounds. No stridor. No wheezing, rhonchi or rales.   Chest:      Chest wall: No tenderness.   Musculoskeletal:         General: Swelling and tenderness present.      Right lower leg: No edema.      Left  lower leg: Edema present.   Lymphadenopathy:      Cervical: No cervical adenopathy.   Neurological:      Mental Status: She is alert and oriented to person, place, and time.   Psychiatric:         Mood and Affect: Mood normal.         Assessment/Plan   Problem List Items Addressed This Visit       Unilateral primary osteoarthritis, right knee    Relevant Orders    Disability Placard    Preoperative clearance - Primary     Pt has right TKR scheduled for mid May and needs preoperative clearance  She had her left knee done in March and is doing well  She will have preoperative labs before the surgery  BP a bit high today but may be secondary to Meloxicam use  She will stop this prior to surgery  She is medically stable for the surgery and is low risk for complications at this time

## 2024-04-12 NOTE — ASSESSMENT & PLAN NOTE
Pt has right TKR scheduled for mid May and needs preoperative clearance  She had her left knee done in March and is doing well  She will have preoperative labs before the surgery  BP a bit high today but may be secondary to Meloxicam use  She will stop this prior to surgery  She is medically stable for the surgery and is low risk for complications at this time

## 2024-04-15 ENCOUNTER — PHARMACY VISIT (OUTPATIENT)
Dept: PHARMACY | Facility: CLINIC | Age: 59
End: 2024-04-15
Payer: COMMERCIAL

## 2024-04-19 ENCOUNTER — HOSPITAL ENCOUNTER (OUTPATIENT)
Dept: RADIOLOGY | Facility: CLINIC | Age: 59
Discharge: HOME | End: 2024-04-19
Payer: COMMERCIAL

## 2024-04-19 VITALS — BODY MASS INDEX: 37.94 KG/M2 | WEIGHT: 265 LBS | HEIGHT: 70 IN

## 2024-04-19 DIAGNOSIS — Z12.31 ENCOUNTER FOR SCREENING MAMMOGRAM FOR MALIGNANT NEOPLASM OF BREAST: ICD-10-CM

## 2024-04-19 PROCEDURE — 77067 SCR MAMMO BI INCL CAD: CPT

## 2024-04-19 PROCEDURE — 77067 SCR MAMMO BI INCL CAD: CPT | Performed by: STUDENT IN AN ORGANIZED HEALTH CARE EDUCATION/TRAINING PROGRAM

## 2024-04-19 PROCEDURE — 77063 BREAST TOMOSYNTHESIS BI: CPT | Performed by: STUDENT IN AN ORGANIZED HEALTH CARE EDUCATION/TRAINING PROGRAM

## 2024-04-22 ENCOUNTER — HOSPITAL ENCOUNTER (OUTPATIENT)
Dept: RADIOLOGY | Facility: HOSPITAL | Age: 59
Discharge: HOME | End: 2024-04-22
Payer: COMMERCIAL

## 2024-04-22 ENCOUNTER — OFFICE VISIT (OUTPATIENT)
Dept: ORTHOPEDIC SURGERY | Facility: HOSPITAL | Age: 59
End: 2024-04-22
Payer: COMMERCIAL

## 2024-04-22 DIAGNOSIS — Z96.651 S/P TOTAL KNEE ARTHROPLASTY, RIGHT: ICD-10-CM

## 2024-04-22 DIAGNOSIS — M17.12 UNILATERAL PRIMARY OSTEOARTHRITIS, LEFT KNEE: ICD-10-CM

## 2024-04-22 DIAGNOSIS — M25.561 ACUTE PAIN OF RIGHT KNEE: ICD-10-CM

## 2024-04-22 DIAGNOSIS — M17.12 PRIMARY OSTEOARTHRITIS OF LEFT KNEE: Primary | ICD-10-CM

## 2024-04-22 PROCEDURE — 73562 X-RAY EXAM OF KNEE 3: CPT | Mod: LT

## 2024-04-22 PROCEDURE — 73562 X-RAY EXAM OF KNEE 3: CPT | Mod: LEFT SIDE | Performed by: RADIOLOGY

## 2024-04-22 PROCEDURE — 99024 POSTOP FOLLOW-UP VISIT: CPT | Performed by: STUDENT IN AN ORGANIZED HEALTH CARE EDUCATION/TRAINING PROGRAM

## 2024-04-22 ASSESSMENT — PAIN - FUNCTIONAL ASSESSMENT: PAIN_FUNCTIONAL_ASSESSMENT: NO/DENIES PAIN

## 2024-04-28 NOTE — PROGRESS NOTES
Deepa Gusman MD   Adult Reconstruction and Joint Replacement Surgery  Phone: [phone]     Fax: [phone]     PREOPERATIVE CONSULTATION    Name: Cristiana Huddleston  : 1965  Date of Visit:  2024    CC: Right knee pain    Clinical History:  Cristiana Huddleston is a 58 y.o. female who presents for discussion of upcoming right total knee arthroplasty. The patient reports 10 years of RIGHT knee pain. The patient has tried at least 3 months of conservative treatments, including Ice, NSAIDs, Activity modification, Physical therapy, Corticosteroid injections , and Xray, and continues to have disabling pain, impaired activities of daily living and worsened quality of life. They rate their pain as up to 10/10.    Patient does not have pain at night. Patient is able to walk indoors only. Patient is currently using cane as assistive device. Primarily complains of medial pain. Patient has difficulty with climbing stairs, descending stairs, walking , and walking on unlevel surfaces . The pain is significantly impacting her ability to perform activities of daily living. Patient reports no longer able to do activities such as walking without pain.     Focused History  PMH: Reviewed and PE/DVT: None  PSH: Reviewed , Hip/Knee replacement: None, Hip/Knee surgery: Bilateral knee scopes. L knee scope in , Anesthesia complications: None, Spine surgery: None, Surgical infection: None, and Weight loss surgery: None  Meds: Reviewed, Current Anticoagulants: None, Weight loss medication: None, and Current Opioids: None  Allergies: Reviewed  and The patient reports no contraindications or allergies to cephalosporins, aspirin, NSAIDs or opioids, except as noted above.  FH: No family history of any bleeding or clotting disorders.  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, and Preferred physical activities: Walking,  "torsten MontielRestoration: no    PROMs    02/12/24   \"ZOE, . Score\" 42.28       Past Medical History:   Diagnosis Date    Anxiety     Arthritis     Closed nondisplaced fracture of head of radius with routine healing 02/16/2023    Herpes zoster without complication 10/11/2023    Obesity (BMI 35.0-39.9 without comorbidity)     Syncope     2018    Unilateral primary osteoarthritis, left knee 03/12/2024     Documented in chart and reviewed.     Past Surgical History:   Procedure Laterality Date    APPENDECTOMY      AUGMENTATION MAMMAPLASTY  May 2000    BREAST SURGERY      Breast augmentation    DILATION AND CURETTAGE OF UTERUS  12/03/2002    Suction    KNEE ARTHROPLASTY      KNEE ARTHROSCOPY W/ MENISCECTOMY      LASIK      RETINAL DETACHMENT REPAIR W/ SCLERAL BUCKLE LE Right 12/19/2022    TOTAL KNEE ARTHROPLASTY Left 03/12/2024       Allergies: She is allergic to chicken feathers allergenic extract.     Medications:  Current Outpatient Medications   Medication Instructions    acetaminophen (TYLENOL) 1,000 mg, oral, Every 8 hours    aspirin 81 mg, oral, 2 times daily    chlorhexidine (Peridex) 0.12 % solution 15 mL, Mouth/Throat, As needed    diphenhydrAMINE-acetaminophen (Tylenol PM Extra Strength)  mg per tablet oral, Nightly PRN    melatonin 10 mg capsule oral, Nightly PRN    meloxicam (MOBIC) 15 mg, oral, Daily    multivitamin (Daily Multi-Vitamin) tablet 1 tablet, oral, Daily    pantoprazole (ProtoNix) 40 mg EC tablet Take 1 tablet (40 mg) by mouth once daily in the morning before meals. Do not crush, chew, or split.    potassium/magnesium (MAGNESIUM-POTASSIUM ORAL) oral, Daily    traZODone (DESYREL) 100 mg, oral, Nightly    vit C/E/Zn/coppr/lutein/zeaxan (PRESERVISION AREDS-2 ORAL) 2 tablets, oral, Daily       Family History   Problem Relation Name Age of Onset    Atrial fibrillation Mother      Melanoma Father       Documented in chart and reviewed.     Social History     Tobacco Use    Smoking status: " Never    Smokeless tobacco: Never   Substance Use Topics    Alcohol use: Not Currently     Comment: since 2017       Review of Systems: Review of systems completed with medical assistant intake. Please refer to this note.     Falls: The patient denies any recent falls or fall-related injuries.    Physical Exam:  BMI: 38, which is abnormal. Encouraged to lose weight and to follow up with PCP.    Constitutional: The patient is well-appearing and well groomed.     Neurological/Psychiatric: The patient is alert and oriented to person, place and time. The patient has a normal mood and affect.    Skin Examination: The skin over the right lower extremity, left lower extremity, right upper extremity, and left upper extremity is intact without any evidence of infection or rash.    Cardiovascular Examination: There are no varicosities and the skin is normal temperature, capillary refill normal, arterial pulses normal, no edema.    Lymphatic Examination: There is no lymphatic swelling or palpable lymph nodes present around the joint.    Neurological Examination: Bilateral lower extremities are grossly neurologically intact. Sensation normal, motor function normal.    Gait: The patient ambulates with an antalgic gait.     Lumbar spine:    No tenderness to palpation midline.    Negative straight leg raise bilaterally.    Right Hip Examination:  The skin is intact over the hip.    There is no tenderness over the greater trochanter.    Range of motion is full extension to 100 degrees of flexion.    The hip is stable without subluxation or dislocation.    The hip internally rotates to 15 degrees and externally rotates to 45 degrees.    There is no pain with hip motion.    Left Hip Examination:  The skin is intact over the hip.    There is no tenderness over the greater trochanter.    Range of motion is full extension to 100 degrees of flexion.    The hip is stable without subluxation or dislocation.    The hip internally rotates to  15 degrees and externally rotates to 45 degrees.    There is no pain with hip motion.    Right Knee Examination:  Examination of the knee reveals the skin to be intact.    There is a moderate effusion in the knee.    The alignment of the knee is Varus.    This deformity is not correctable.    There is tenderness to palpation over the joint line.    There is significant quadriceps atrophy.    Range of Motion: 5 to 110 degrees of flexion.    The knee is stable to varus-valgus stress and anterior-posterior stress.     There is moderate grinding with range of motion.    There is moderate patellofemoral crepitus.    Left Knee Examination:  Examination of the knee reveals the skin to be intact.  Healed anterior incision.    There is no obvious swelling.    There is a no effusion in the knee.     The alignment of the knee is normal.    There is no tenderness to palpation over the joint line.    There is no significant quadriceps atrophy.    Range of motion is full extension to 120 degrees of flexion.    The knee is stable to varus-valgus stress and anterior-posterior stress.     There is no grinding with range of motion.    There is no patellofemoral crepitus.    Radiographs:  Radiographs were personally reviewed today. There is evidence of severe RIGHT  knee osteoarthritis with MEDIAL bone on bone apposition.    Radiographs of the left knee were also reviewed.  There is a left total knee arthroplasty in place with satisfactory alignment.  No evidence of brian-implant lucency, osteolysis or failure.    Impression:  58 y.o. female presents with severe RIGHT  knee osteoarthritis with bone on bone apposition. Patient has tried and failed appropriate conservative measures and now has limitation in ADL's.     Diagnosis:  No diagnosis found.    Recommendations / Plan:    I have discussed the options in detail with the patient. We have discussed anti-inflammatory medication, activity modification, physical therapy, corticosteroid  injections, viscosupplementation injections, partial knee replacement surgery and total knee replacement surgery.    The risks and benefits of all these treatment options have been discussed in detail. The patient has tried at least 3 months of the above conservative treatments and continues to have disabling pain, impaired activities of daily living and worsened quality of life. The patient is a candidate for a RIGHT  TOTAL knee replacement.     We discussed the hospital stay, postoperative rehab and follow up required as well as the potential risks of surgery including bleeding, need for homologous blood transfusion, infection, need for reoperation, failure of the operation to provide symptomatic relief, need for multiple revision surgeries in the setting of bleeding, wear, infection, instability, stiffness (meaning loss of flexion and/or loss of extension) requiring closed and/or open manipulation and/or revision, damage to major neurovascular structures, venous thrombolic disease, complications of regional and/or general anesthesia, as well as death. The patient also understands that a good result is not guaranteed and that poor outcomes can at times be unavoidable. The patient may also have persistent and chronic pain that could require further intervention.  The patient confirms their understanding of the risks as well as the benefits of surgery. I have explained that although knee replacement generally provides excellent pain relief and improvement in function, some patients continue to have a feeling of stiffness in the knee that can be permanent. We discussed the importance of physical therapy postoperatively to regain knee range of motion. Postoperative pain management strategies were reviewed. We discussed that most patients discharge home in 0-1 days after their surgery depending on their medical health, physical function and social support.      We have reviewed the typical recovery after surgery and  have discussed the fact that full recovery can take up to 1 year or even longer.     The patient does not have any of the following contraindications to arthroplasty including active infection of the knee joint, systemic bacteremia, active skin infection or an open wound at the surgical site, neuropathic arthritis or severe, rapidly progressive neurological disease.     Patient will consider proceeding with RIGHT  TOTAL knee replacement. All questions were answered today. If the patient chooses to move forward with surgical scheduling, they will be enrolled in a preoperative arthroplasty teaching class and the pre-admission testing pathway. The patient was encouraged to contact their primary care physician to discuss fitness for surgery. The patient has sought medical clearance from: Primary Care Physician: [x] . Pre-admission testing appointment date: 5/6/2024.    Social support after surgery: mother, husband3  Pain medication plan: Standard (Acetominophen, Meloxicam, Oxycodone, Tramadol)   Additional preoperative considerations: None    Diagnosis: M17.11 - RIGHT knee osteoarthritis    Procedure: 10608 - Total KNEE Arthroplasty (TKA)   Surgery Location: [facility]   Equipment Requests: TKA: Tiemann thin bent Hohmann retractors, 2 Langenbecks and DEPUY: Attune CR with MS (PS on-hand)   Anesthesia: TKA: Regional - Spinal, Adductor canal block, iPACK block   Discharge: Same-day (RAPID)      _____________  Deepa Gusman MD   [facility]     Approximately 45 minutes were spent on the following tasks:              Preparing for the patient              Reviewing medical records              Taking a patient history              Performing a physical exam              Reviewing treatment options with the patient              Explaining the risks, potential benefits, and alternative to surgery  Explaining the expected rehabilitation after each treatment option  Explaining the  potential long term expectations  Evaluating the diagnostic imaging   Templating pre-operative or current imaging  Performing surgical planning and booking     This office note was transcribed with dictation software.  Please excuse any typographical errors, program misunderstandings leading to inadvertent insertions or deletions of inappropriate wording, pronoun errors and other unintentional transcription errors not noticed on proof-reading.

## 2024-05-06 ENCOUNTER — HOSPITAL ENCOUNTER (OUTPATIENT)
Dept: RADIOLOGY | Facility: HOSPITAL | Age: 59
Discharge: HOME | End: 2024-05-06
Payer: COMMERCIAL

## 2024-05-06 ENCOUNTER — TELEPHONE (OUTPATIENT)
Dept: ORTHOPEDIC SURGERY | Facility: HOSPITAL | Age: 59
End: 2024-05-06

## 2024-05-06 ENCOUNTER — PRE-ADMISSION TESTING (OUTPATIENT)
Dept: PREADMISSION TESTING | Facility: HOSPITAL | Age: 59
End: 2024-05-06
Payer: COMMERCIAL

## 2024-05-06 ENCOUNTER — LAB (OUTPATIENT)
Dept: LAB | Facility: LAB | Age: 59
End: 2024-05-06
Payer: COMMERCIAL

## 2024-05-06 VITALS
HEIGHT: 70 IN | OXYGEN SATURATION: 95 % | BODY MASS INDEX: 37.59 KG/M2 | SYSTOLIC BLOOD PRESSURE: 121 MMHG | TEMPERATURE: 98.6 F | DIASTOLIC BLOOD PRESSURE: 90 MMHG | WEIGHT: 262.57 LBS | RESPIRATION RATE: 16 BRPM | HEART RATE: 75 BPM

## 2024-05-06 DIAGNOSIS — M25.561 ACUTE PAIN OF RIGHT KNEE: ICD-10-CM

## 2024-05-06 DIAGNOSIS — M17.11 UNILATERAL PRIMARY OSTEOARTHRITIS, RIGHT KNEE: ICD-10-CM

## 2024-05-06 DIAGNOSIS — Z01.818 PREOPERATIVE TESTING: ICD-10-CM

## 2024-05-06 DIAGNOSIS — Z01.818 PREOPERATIVE TESTING: Primary | ICD-10-CM

## 2024-05-06 DIAGNOSIS — M17.12 UNILATERAL PRIMARY OSTEOARTHRITIS, LEFT KNEE: ICD-10-CM

## 2024-05-06 PROCEDURE — 77073 BONE LENGTH STUDIES: CPT

## 2024-05-06 PROCEDURE — 99214 OFFICE O/P EST MOD 30 MIN: CPT | Performed by: NURSE PRACTITIONER

## 2024-05-06 PROCEDURE — 73564 X-RAY EXAM KNEE 4 OR MORE: CPT | Performed by: RADIOLOGY

## 2024-05-06 PROCEDURE — 77073 BONE LENGTH STUDIES: CPT | Performed by: RADIOLOGY

## 2024-05-06 PROCEDURE — 87081 CULTURE SCREEN ONLY: CPT

## 2024-05-06 PROCEDURE — 73564 X-RAY EXAM KNEE 4 OR MORE: CPT | Mod: RT

## 2024-05-06 RX ORDER — ACETAMINOPHEN 500 MG
1000 TABLET ORAL EVERY 8 HOURS
Qty: 360 TABLET | Refills: 0 | OUTPATIENT
Start: 2024-05-06 | End: 2024-05-10

## 2024-05-06 RX ORDER — CHLORHEXIDINE GLUCONATE ORAL RINSE 1.2 MG/ML
15 SOLUTION DENTAL AS NEEDED
Qty: 30 ML | Refills: 0 | Status: SHIPPED | OUTPATIENT
Start: 2024-05-06 | End: 2024-05-14 | Stop reason: HOSPADM

## 2024-05-06 RX ORDER — MELOXICAM 15 MG/1
15 TABLET ORAL DAILY
Qty: 60 TABLET | Refills: 0 | OUTPATIENT
Start: 2024-05-06 | End: 2024-05-10

## 2024-05-06 ASSESSMENT — ENCOUNTER SYMPTOMS
JOINT SWELLING: 1
ROS GI COMMENTS: GERD
CARDIOVASCULAR NEGATIVE: 1
RESPIRATORY NEGATIVE: 1
EYES NEGATIVE: 1
ALLERGIC/IMMUNOLOGIC NEGATIVE: 1
CONSTITUTIONAL NEGATIVE: 1
ENDOCRINE NEGATIVE: 1

## 2024-05-06 ASSESSMENT — PAIN DESCRIPTION - DESCRIPTORS: DESCRIPTORS: PRESSURE;SHARP;ACHING

## 2024-05-06 ASSESSMENT — PAIN - FUNCTIONAL ASSESSMENT: PAIN_FUNCTIONAL_ASSESSMENT: 0-10

## 2024-05-06 ASSESSMENT — PAIN SCALES - GENERAL: PAINLEVEL_OUTOF10: 7

## 2024-05-06 NOTE — H&P (VIEW-ONLY)
CPM/PAT Evaluation   Cristiana Huddleston is a 58 y.o. female   Chief Complaint: knee pain    HPI:  Patient is a 57 y/o alert and oriented female coming in for PAT for a scheduled Arthroplasty Resurfacing Total Knee on 5/14/2024 w/ Dr. Gusman.    The patient reports she has constant achy knee pain.  She states her right knee is swollen and will give out at times.  She reports physical therapy causes stabby pain.  She states elevation, ice and tylenol help.  She reports she had a left knee replacement 7 weeks ago and is doing well.    Patient denies chest pain, SOB, PEGUERO and NVDC.    Patient also denies Hx: DVT/PE.    Current medications were reviewed and a presurgical mediation schedule was provided.    She has no questions at this time.     Past Medical History:   Diagnosis Date    Anxiety     Arthritis     Closed nondisplaced fracture of head of radius with routine healing 02/16/2023    Herpes zoster without complication 10/11/2023    Obesity (BMI 35.0-39.9 without comorbidity)     Syncope     2018    Unilateral primary osteoarthritis, left knee 03/12/2024      Past Surgical History:   Procedure Laterality Date    APPENDECTOMY      AUGMENTATION MAMMAPLASTY  May 2000    BREAST SURGERY      Breast augmentation    DILATION AND CURETTAGE OF UTERUS  12/03/2002    Suction    KNEE ARTHROSCOPY W/ MENISCECTOMY      LASIK      RETINAL DETACHMENT REPAIR W/ SCLERAL BUCKLE LE Right 12/19/2022    TOTAL KNEE ARTHROPLASTY Left 03/12/2024        Allergies   Allergen Reactions    Chicken Feathers Allergenic Extract Hives     Chicken Feathers: only where skin has been in contact with feathers        Current Outpatient Medications on File Prior to Visit   Medication Sig Dispense Refill    acetaminophen (Tylenol) 500 mg tablet Take 2 tablets (1,000 mg) by mouth every 8 hours. 360 tablet 0    aspirin 81 mg chewable tablet Chew 1 tablet (81 mg) 2 times a day. 84 tablet 0    diphenhydrAMINE-acetaminophen (Tylenol PM Extra Strength)  mg  per tablet Take by mouth as needed at bedtime.      melatonin 10 mg capsule Take by mouth as needed at bedtime for sleep.      meloxicam (Mobic) 15 mg tablet Take 1 tablet (15 mg) by mouth once daily. 60 tablet 0    multivitamin (Daily Multi-Vitamin) tablet Take 1 tablet by mouth once daily.      pantoprazole (ProtoNix) 40 mg EC tablet Take 1 tablet (40 mg) by mouth once daily in the morning before meals. Do not crush, chew, or split. 30 tablet 0    potassium/magnesium (MAGNESIUM-POTASSIUM ORAL) Take by mouth once daily.      traZODone (Desyrel) 50 mg tablet Take 2 tablets (100 mg) by mouth once daily at bedtime. 180 tablet 1    vit C/E/Zn/coppr/lutein/zeaxan (PRESERVISION AREDS-2 ORAL) Take 2 tablets by mouth once daily.       No current facility-administered medications on file prior to visit.       Vitals:    05/06/24 0818   BP: 121/90   Pulse: 75   Resp: 16   Temp: 37 °C (98.6 °F)   SpO2: 95%       Review of Systems   Constitutional: Negative.    HENT: Negative.     Eyes: Negative.    Respiratory: Negative.     Cardiovascular: Negative.    Gastrointestinal:         Gerd   Endocrine: Negative.    Genitourinary: Negative.    Musculoskeletal:  Positive for gait problem and joint swelling.        See hpi for details  Left tkr 7 weeks ago  Uses a cane   Skin: Negative.    Allergic/Immunologic: Negative.    Neurological:         Negative   Psychiatric/Behavioral:          Difficulty sleeping      Physical Exam  Vitals and nursing note reviewed.   Constitutional:       Appearance: Normal appearance.   HENT:      Head: Normocephalic and atraumatic.      Mouth/Throat:      Mouth: Mucous membranes are moist.      Pharynx: Oropharynx is clear.   Eyes:      Pupils: Pupils are equal, round, and reactive to light.   Cardiovascular:      Rate and Rhythm: Normal rate and regular rhythm.      Heart sounds: Normal heart sounds.   Pulmonary:      Effort: Pulmonary effort is normal.      Breath sounds: Normal breath sounds.    Abdominal:      General: Bowel sounds are normal.      Palpations: Abdomen is soft.   Musculoskeletal:         General: Swelling present.      Cervical back: Normal range of motion and neck supple.      Comments: Swelling of right knee  Left knee incision healed   Uses a cane for ambulation assistance   Skin:     General: Skin is warm and dry.   Neurological:      General: No focal deficit present.      Mental Status: She is alert and oriented to person, place, and time.   Psychiatric:         Mood and Affect: Mood normal.         Behavior: Behavior normal.         Thought Content: Thought content normal.         Judgment: Judgment normal.        PAT AIRWAY:   Airway:     TM distance::  >3 FB    Neck ROM::  Full    Assessment and Plan:     Neuro:   No neurologic diagnoses.  The patient is not at an increased risk for post operative delirium.  Preoperative brain exercise educational handout provided to patient.    The patient is at an increased risk for perioperative stroke secondary to female sex .    Insomnia  Managed with trazadone 10mg qhs    HEENT/Airway:    Normal exam   As abovoe    Cardiovascular:   No additional preoperative testing is currently indicated.    ASA  EKG  3/7/2024 NSR rate of 69    METS are  RCRI  0 which is 3.9% % 30 day risk of MACE (risk for cardiac death, nonfatal myocardial infarction, and nonfactal cardiac arrest  ANDREA score which indicates a  0.1 % risk of intraoperative or 30-day postoperative MACE      Pulmonary:     Preoperative deep breathing educational handout provided to patient.    ARISCAT: 11   points which is a low (1.6%) risk of in-hospital post-op pulmonary complications   PRODIGY:   8 points which is a intermediate risk of post op opioid induced respiratory depression episodes  STOP BANG:    3 points which is a intermediate risk for moderate to severe JOSE    Renal:   No diagnosis or significant findings on chart review or clinical presentation and evaluation, however, the  patient is at increased risk of perioperative renal complications secondary to age>/= 56.     Pt at Low risk for perioperative MARCUS based on Dynamic Predictive Scoring Tool for Perioperative MARCUS  BMP ordered today    Endocrine:  No diagnosis or significant findings on chart review or clinical presentation and evaluation.     Hematologic:  No diagnosis or significant findings on chart review or clinical presentation and evaluation.  Preoperative DVT educational handout provided to patient.    Caprini Score:  6  points which is a high risk of perioperative VTE    Gastrointestinal:     Stable no issues    Managed with pantoproazole 40mg daily  EAT-10 score of   0 - self-perceived oropharyngeal dysphagia scale (0-40)   Apfel: 1 points 21%% risk for post operative N/V    Infectious disease:  negative or type diagnoses  Chlorhexadine 0.12% mouth wash sent to the pharmacy for the patient  Body wash given to the patient today in PAT  Staph screen collected     Musculoskeletal:    Unilateral Primary Osteoarthritis Right Knee  Arthroplasty Resurfacing Total Knee  Managed with meloxicam 15mg daily     Other:     NSQUIP    overall surgical risk  Modified Frailty  JHFRAT-  2 low  falls risk    Preoperative clearance - Primary        Pt has right TKR scheduled for mid May and needs preoperative clearance  She had her left knee done in March and is doing well  She will have preoperative labs before the surgery  BP a bit high today but may be secondary to Meloxicam use  She will stop this prior to surgery  She is medically stable for the surgery and is low risk for complications at this time             Anesthesia:  No anesthesia complications    no dental issues  Does not smoke  No drug or alcohol use  no personal/family issues with Anesthesia  No nickel, shell fish, or iodine allergies    Discussed with patient medication instructions, NPO guidelines, and any questions or concerns. Patient does not need further workup prior to  preceding with elective surgery based on based on risk assessment.     Chlorhexidine .12% Dental Rinse e-prescribed per  infection prevention protocol. Patient educated.   Patient advised to call [facility] if mouthwash not received.  Patient reminded to get ordered x rays after pat today    Face to Face patient contact time 30 minutes    DON Marcum-CNP 5/6/2024 7:07 AM    Labs ordered- MRSA  A1C 3/7/2024 5.2  CBC 3/7/2024 - stable  BMP - 3/7/2024 normal

## 2024-05-06 NOTE — TELEPHONE ENCOUNTER
Thank you for taking my call today.  All questions were answered at the time of the call, but please feel free to reach out to me via Taggedhart or phone, [phone], with any new questions or concerns.       We confirmed that you opted to enroll in our [id number] program so your discharge prescriptions will be available to take home at the time of discharge.  Please bring any prescription insurance coverage with you on the morning of surgery so that we can enter the information into our system.     We confirmed that your plan would be to Discharge Home same day (Rapid Recovery).    We confirmed that you have DME needed for recovery.     Use the provided body wash for 4 days before surgery and complete a 5th shower on the morning of surgery, this includes your body and hair.  Follow the directions as provided during preadmission testing.  The mouth wash will be used the night before and the morning of surgery.       As a reminder, if you do not hear from our team, please call [phone] between 2pm and 3pm the business day before your surgery to confirm your arrival time and details.        Please don't hesitate to reach out with additional questions or concerns.    Rosey Mcgee MBA, BSN, RN-BC  PAPA MartinezN, RN  Orthopedic Program Navigators  [facility]  [phone]

## 2024-05-06 NOTE — CPM/PAT H&P
CPM/PAT Evaluation   Cristiana Huddleston is a 58 y.o. female   Chief Complaint: knee pain    HPI:  Patient is a 57 y/o alert and oriented female coming in for PAT for a scheduled Arthroplasty Resurfacing Total Knee on 5/14/2024 w/ Dr. Gusman.    The patient reports she has constant achy knee pain.  She states her right knee is swollen and will give out at times.  She reports physical therapy causes stabby pain.  She states elevation, ice and tylenol help.  She reports she had a left knee replacement 7 weeks ago and is doing well.    Patient denies chest pain, SOB, PEGUERO and NVDC.    Patient also denies Hx: DVT/PE.    Current medications were reviewed and a presurgical mediation schedule was provided.    She has no questions at this time.     Past Medical History:   Diagnosis Date    Anxiety     Arthritis     Closed nondisplaced fracture of head of radius with routine healing 02/16/2023    Herpes zoster without complication 10/11/2023    Obesity (BMI 35.0-39.9 without comorbidity)     Syncope     2018    Unilateral primary osteoarthritis, left knee 03/12/2024      Past Surgical History:   Procedure Laterality Date    APPENDECTOMY      AUGMENTATION MAMMAPLASTY  May 2000    BREAST SURGERY      Breast augmentation    DILATION AND CURETTAGE OF UTERUS  12/03/2002    Suction    KNEE ARTHROSCOPY W/ MENISCECTOMY      LASIK      RETINAL DETACHMENT REPAIR W/ SCLERAL BUCKLE LE Right 12/19/2022    TOTAL KNEE ARTHROPLASTY Left 03/12/2024        Allergies   Allergen Reactions    Chicken Feathers Allergenic Extract Hives     Chicken Feathers: only where skin has been in contact with feathers        Current Outpatient Medications on File Prior to Visit   Medication Sig Dispense Refill    acetaminophen (Tylenol) 500 mg tablet Take 2 tablets (1,000 mg) by mouth every 8 hours. 360 tablet 0    aspirin 81 mg chewable tablet Chew 1 tablet (81 mg) 2 times a day. 84 tablet 0    diphenhydrAMINE-acetaminophen (Tylenol PM Extra Strength)  mg  per tablet Take by mouth as needed at bedtime.      melatonin 10 mg capsule Take by mouth as needed at bedtime for sleep.      meloxicam (Mobic) 15 mg tablet Take 1 tablet (15 mg) by mouth once daily. 60 tablet 0    multivitamin (Daily Multi-Vitamin) tablet Take 1 tablet by mouth once daily.      pantoprazole (ProtoNix) 40 mg EC tablet Take 1 tablet (40 mg) by mouth once daily in the morning before meals. Do not crush, chew, or split. 30 tablet 0    potassium/magnesium (MAGNESIUM-POTASSIUM ORAL) Take by mouth once daily.      traZODone (Desyrel) 50 mg tablet Take 2 tablets (100 mg) by mouth once daily at bedtime. 180 tablet 1    vit C/E/Zn/coppr/lutein/zeaxan (PRESERVISION AREDS-2 ORAL) Take 2 tablets by mouth once daily.       No current facility-administered medications on file prior to visit.       Vitals:    05/06/24 0818   BP: 121/90   Pulse: 75   Resp: 16   Temp: 37 °C (98.6 °F)   SpO2: 95%       Review of Systems   Constitutional: Negative.    HENT: Negative.     Eyes: Negative.    Respiratory: Negative.     Cardiovascular: Negative.    Gastrointestinal:         Gerd   Endocrine: Negative.    Genitourinary: Negative.    Musculoskeletal:  Positive for gait problem and joint swelling.        See hpi for details  Left tkr 7 weeks ago  Uses a cane   Skin: Negative.    Allergic/Immunologic: Negative.    Neurological:         Negative   Psychiatric/Behavioral:          Difficulty sleeping      Physical Exam  Vitals and nursing note reviewed.   Constitutional:       Appearance: Normal appearance.   HENT:      Head: Normocephalic and atraumatic.      Mouth/Throat:      Mouth: Mucous membranes are moist.      Pharynx: Oropharynx is clear.   Eyes:      Pupils: Pupils are equal, round, and reactive to light.   Cardiovascular:      Rate and Rhythm: Normal rate and regular rhythm.      Heart sounds: Normal heart sounds.   Pulmonary:      Effort: Pulmonary effort is normal.      Breath sounds: Normal breath sounds.    Abdominal:      General: Bowel sounds are normal.      Palpations: Abdomen is soft.   Musculoskeletal:         General: Swelling present.      Cervical back: Normal range of motion and neck supple.      Comments: Swelling of right knee  Left knee incision healed   Uses a cane for ambulation assistance   Skin:     General: Skin is warm and dry.   Neurological:      General: No focal deficit present.      Mental Status: She is alert and oriented to person, place, and time.   Psychiatric:         Mood and Affect: Mood normal.         Behavior: Behavior normal.         Thought Content: Thought content normal.         Judgment: Judgment normal.        PAT AIRWAY:   Airway:     TM distance::  >3 FB    Neck ROM::  Full    Assessment and Plan:     Neuro:   No neurologic diagnoses.  The patient is not at an increased risk for post operative delirium.  Preoperative brain exercise educational handout provided to patient.    The patient is at an increased risk for perioperative stroke secondary to female sex .    Insomnia  Managed with trazadone 10mg qhs    HEENT/Airway:    Normal exam   As abovoe    Cardiovascular:   No additional preoperative testing is currently indicated.    ASA  EKG  3/7/2024 NSR rate of 69    METS are  RCRI  0 which is 3.9% % 30 day risk of MACE (risk for cardiac death, nonfatal myocardial infarction, and nonfactal cardiac arrest  ANDREA score which indicates a  0.1 % risk of intraoperative or 30-day postoperative MACE      Pulmonary:     Preoperative deep breathing educational handout provided to patient.    ARISCAT: 11   points which is a low (1.6%) risk of in-hospital post-op pulmonary complications   PRODIGY:   8 points which is a intermediate risk of post op opioid induced respiratory depression episodes  STOP BANG:    3 points which is a intermediate risk for moderate to severe JOSE    Renal:   No diagnosis or significant findings on chart review or clinical presentation and evaluation, however, the  patient is at increased risk of perioperative renal complications secondary to age>/= 56.     Pt at Low risk for perioperative MARCUS based on Dynamic Predictive Scoring Tool for Perioperative MARCUS  BMP ordered today    Endocrine:  No diagnosis or significant findings on chart review or clinical presentation and evaluation.     Hematologic:  No diagnosis or significant findings on chart review or clinical presentation and evaluation.  Preoperative DVT educational handout provided to patient.    Caprini Score:  6  points which is a high risk of perioperative VTE    Gastrointestinal:     Stable no issues    Managed with pantoproazole 40mg daily  EAT-10 score of   0 - self-perceived oropharyngeal dysphagia scale (0-40)   Apfel: 1 points 21%% risk for post operative N/V    Infectious disease:  negative or type diagnoses  Chlorhexadine 0.12% mouth wash sent to the pharmacy for the patient  Body wash given to the patient today in PAT  Staph screen collected     Musculoskeletal:    Unilateral Primary Osteoarthritis Right Knee  Arthroplasty Resurfacing Total Knee  Managed with meloxicam 15mg daily     Other:     NSQUIP    overall surgical risk  Modified Frailty  JHFRAT-  2 low  falls risk    Preoperative clearance - Primary        Pt has right TKR scheduled for mid May and needs preoperative clearance  She had her left knee done in March and is doing well  She will have preoperative labs before the surgery  BP a bit high today but may be secondary to Meloxicam use  She will stop this prior to surgery  She is medically stable for the surgery and is low risk for complications at this time             Anesthesia:  No anesthesia complications    no dental issues  Does not smoke  No drug or alcohol use  no personal/family issues with Anesthesia  No nickel, shell fish, or iodine allergies    Discussed with patient medication instructions, NPO guidelines, and any questions or concerns. Patient does not need further workup prior to  preceding with elective surgery based on based on risk assessment.     Chlorhexidine .12% Dental Rinse e-prescribed per  infection prevention protocol. Patient educated.   Patient advised to call [facility] if mouthwash not received.  Patient reminded to get ordered x rays after pat today    Face to Face patient contact time 30 minutes    DON Marcum-CNP 5/6/2024 7:07 AM    Labs ordered- MRSA  A1C 3/7/2024 5.2  CBC 3/7/2024 - stable  BMP - 3/7/2024 normal

## 2024-05-06 NOTE — PREPROCEDURE INSTRUCTIONS
Medication List            Accurate as of May 6, 2024  8:20 AM. Always use your most recent med list.                acetaminophen 500 mg tablet  Commonly known as: Tylenol  Take 2 tablets (1,000 mg) by mouth every 8 hours.  Medication Adjustments for Surgery: Take morning of surgery with sip of water, no other fluids  Notes to patient: Take as needed     aspirin 81 mg chewable tablet  Chew 1 tablet (81 mg) 2 times a day.  Medication Adjustments for Surgery: Stop 7 days before surgery  Notes to patient: Last dose 5/7/2024     chlorhexidine 0.12 % solution  Commonly known as: Peridex  Use 15 mL in the mouth or throat if needed (pre operative 15ml swish and spit the night befor and morning of surgery) for up to 2 doses.  Notes to patient: As instructed     Daily Multi-Vitamin tablet  Generic drug: multivitamin  Medication Adjustments for Surgery: Stop 7 days before surgery  Notes to patient: Last dose 5/7/2024     MAGNESIUM-POTASSIUM ORAL  Medication Adjustments for Surgery: Stop 1 day before surgery     melatonin 10 mg capsule  Medication Adjustments for Surgery: Stop 7 days before surgery  Notes to patient: Last dose 5/7/2024     meloxicam 15 mg tablet  Commonly known as: Mobic  Take 1 tablet (15 mg) by mouth once daily.  Medication Adjustments for Surgery: Stop 7 days before surgery  Notes to patient: Last dose 5/7/2024     pantoprazole 40 mg EC tablet  Commonly known as: ProtoNix  Take 1 tablet (40 mg) by mouth once daily in the morning before meals. Do not crush, chew, or split.  Medication Adjustments for Surgery: Take morning of surgery with sip of water, no other fluids     PRESERVISION AREDS-2 ORAL  Medication Adjustments for Surgery: Stop 7 days before surgery  Notes to patient: Last dose 5/7/2024     traZODone 50 mg tablet  Commonly known as: Desyrel  Take 2 tablets (100 mg) by mouth once daily at bedtime.  Medication Adjustments for Surgery: Continue until night before surgery     Tylenol PM Extra  Strength  mg per tablet  Generic drug: diphenhydrAMINE-acetaminophen  Medication Adjustments for Surgery: Continue until night before surgery              NPO Instructions:    Do not eat any food after midnight the night before your surgery/procedure.  You may have clear liquids until TWO hours before surgery/procedure. This includes water, black tea/coffee, (no milk or cream) apple juice and electrolyte drinks (Gatorade).  You may chew gum up to TWO hours before your surgery/procedure.    Additional Instructions:     Seven/Six Days before Surgery:  Review your medication instructions, stop indicated medications  Five Days before Surgery:  Review your medication instructions, stop indicated medications  Begin using your Hibiclens  Three Days before Surgery:  Review your medication instructions, stop indicated medications  The Day before Surgery:  Review your medication instructions, stop indicated medications  You will be contacted regarding the time of your arrival to facility and surgery time  Do not eat any food after Midnight  Day of Surgery:  Review your medication instructions, take indicated medications  You may have clear liquids until TWO hours before surgery/procedure.  This includes water, black tea/coffee, (no milk or cream) apple juice and electrolyte drinks (Gatorade)  You may chew gum up to TWO hours before your surgery/procedure  Wear  comfortable loose fitting clothing  Do not use moisturizers, creams, lotions or perfume  All jewelry and valuables should be left at home  CONTACT SURGEON'S OFFICE IF YOU DEVELOP:  * Fever = 100.4 F   * New respiratory symptoms (e.g. cough, shortness of breath, respiratory distress, sore throat)  * Recent loss of taste or smell  *Flu like symptoms such as headache, fatigue or gastrointestinal symptoms  * You develop any open sores, shingles, burning or painful urination   AND/OR:  * You no longer wish to have the surgery.  * Any other personal circumstances  change that may lead to the need to cancel or defer this surgery.  *You were admitted to any hospital within one week of your planned procedure.    SMOKING:  *Quitting smoking can make a huge difference to your health and recovery from surgery.    *If you need help with quitting, call 3-438-QUIT-NOW.    THE DAY BEFORE SURGERY:  *Do not eat any food after midnight the night before your surgery.   *You may have up to 13.5 OUNCES of clear liquids until TWO hours before your instructed ARRIVAL TIME to hospital. This includes water, black tea/coffee, (no milk or cream) apple juice, clear broth and electrolyte drinks (Gatorade). Please avoid clear liquids that are red in color.   *You may chew gum/mints up to TWO hours before your surgery/procedure.    SURGICAL TIME:  *You will be contacted between 2 p.m. and 3 p.m. the business day before your surgery with your arrival time.  *If you haven't received a call by 3pm, call [phone]  *Scheduled surgery times may change and you will be notified if this occurs-check your personal voicemail for any updates.    ON THE MORNING OF SURGERY:  *Wear comfortable, loose fitting clothing.   *Do not use moisturizers, creams, lotions or perfume.  *All jewelry and valuables should be left at home.  *Prosthetic devices such as contact lenses, hearing aids, dentures, eyelash extensions, hairpins and body piercing must be removed before surgery.    BRING WITH YOU:  *Photo ID and insurance card  *Current list of medications and allergies  *Pacemaker/Defibrillator/Heart stent cards  *CPAP machine and mask  *Slings/splints/crutches  *Copy of your complete Advanced Directive/DHPOA-if applicable  *Neurostimulator implant remote    PARKING AND ARRIVAL:  *Check in at the Main Entrance desk and let them know you are here for surgery.    IF YOU ARE HAVING OUTPATIENT/SAME DAY SURGERY:  *A responsible adult MUST accompany you at the time of discharge and stay with you for 24 hours after your  surgery.  *You may NOT drive yourself home after surgery.  *You may use a taxi or ride sharing service ([employer], Uber) to return home ONLY if you are accompanied by a friend or family member.  *Instructions for resuming your medications will be provided by your surgeon.    Thank you for coming to Pre Admission testing.     If I have prescribe medication please don't forget to  at your pharmacy.     Any questions about today's visit call [phone] and leave a message in the general mailbox.    Patient instructed to ambulate as soon as possible postoperatively to decrease thromboembolic risk.    Nikki Mclaughlin, APRN-CNP    Thank you for visiting [facility].  If you have any changes to your health condition, please call the surgeons office to alert them and give them details of your symptoms.    Preoperative Fasting Guidelines    Why must I stop eating and drinking near surgery time?  With sedation, food or liquid in your stomach can enter your lungs causing serious complications  Increases nausea and vomiting    When do I need to stop eating and drinking before my surgery?  Do not eat any food after midnight the night before your surgery/procedure.  You may have up to 13.5 ounces of clear liquid until TWO hours before your instructed arrival time to the hospital.  This includes water, black tea/coffee, (no milk or cream) apple juice, and electrolyte drinks (Gatorade)  You may chew gum until TWO hours before your surgery/procedure      Additional Instructions:     The Day before Surgery:  -Review your medication instructions, stop indicated medications  -You will be contacted in the evening regarding the time of your arrival to facility and surgery time    Day of Surgery:  -Review your medication instructions, take indicated medications  -Wear comfortable loose fitting clothing  -Do not use moisturizers, creams, lotions or perfume  -All jewelry and valuables should be left at  home      Preoperative Brain Exercises    What are brain exercises?  A brain exercise is any activity that engages your thinking (cognitive) skills.    What types of activities are considered brain exercises?  Jigsaw puzzles, crossword puzzles, word jumble, memory games, word search, and many more.  Many can be found free online or on your phone via a mobile jens.    Why should I do brain exercises before my surgery?  More recent research has shown brain exercise before surgery can lower the risk of postoperative delirium (confusion) which can be especially important for older adults.  Patients who did brain exercises for 5 to 10 hours the days before surgery, cut their risk of postoperative delirium in half up to 1 week after surgery.        [facility]    Preoperative Deep Breathing Exercises    Why it is important to do deep breathing exercises before my surgery?  Deep breathing exercises strengthen your breathing muscles.  This helps you to recover after your surgery and decreases the chance of breathing complications.      How are the deep breathing exercises done?  Sit straight with your back supported.  Breathe in deeply and slowly through your nose. Your lower rib cage should expand and your abdomen may move forward.  Hold that breath for 3 to 5 seconds.  Breathe out through pursed lips, slowly and completely.  Rest and repeat 10 times every hour while awake.  Rest longer if you become dizzy or lightheaded.        [facility]    Preoperative Deep Breathing Exercises    Why it is important to do deep breathing exercises before my surgery?  Deep breathing exercises strengthen your breathing muscles.  This helps you to recover after your surgery and decreases the chance of breathing complications.      How are the deep breathing exercises done?  Sit straight with your back supported.  Breathe in deeply and slowly through your nose. Your lower rib cage should  expand and your abdomen may move forward.  Hold that breath for 3 to 5 seconds.  Breathe out through pursed lips, slowly and completely.  Rest and repeat 10 times every hour while awake.  Rest longer if you become dizzy or lightheaded.      Patient Information: Incentive Spirometer  What is an incentive spirometer?  An incentive spirometer is a device used before and after surgery to “exercise” your lungs.  It helps you to take deeper breaths to expand your lungs.  Below is an example of a basic incentive spirometer.  The device you receive may differ slightly but they all function the same.    Why do I need to use an incentive spirometer?  Using your incentive spirometer prepares your lungs for surgery and helps prevent lung problems after surgery.  How do I use my incentive spirometer?  When you're using your incentive spirometer, make sure to breathe through your mouth. If you breathe through your nose, the incentive spirometer won't work properly. You can hold your nose if you have trouble.  If you feel dizzy at any time, stop and rest. Try again at a later time.  Follow the steps below:  Set up your incentive spirometer, expand the flexible tubing and connect to the outlet.  Sit upright in a chair or bed. Hold the incentive spirometer at eye level.   Put the mouthpiece in your mouth and close your lips tightly around it. Slowly breathe out (exhale) completely.  Breathe in (inhale) slowly through your mouth as deeply as you can. As you take a breath, you will see the piston rise inside the large column. While the piston rises, the indicator should move upwards. It should stay in between the 2 arrows (see Figure).  Try to get the piston as high as you can, while keeping the indicator between the arrows.   If the indicator doesn't stay between the arrows, you're breathing either too fast or too slow.  When you get it as high as you can, hold your breath for 10 seconds, or as long as possible. While you're holding  your breath, the piston will slowly fall to the base of the spirometer.  Once the piston reaches the bottom of the spirometer, breathe out slowly through your mouth. Rest for a few seconds.  Repeat 10 times. Try to get the piston to the same level with each breath.  Repeat every hour while awake  You can carefully clean the outside of the mouthpiece with an alcohol wipe or soap and water.      Patient and Family Education             Ways You Can Help Prevent Blood Clots             This handout explains some simple things you can do to help prevent blood clots.      Blood clots are blockages that can form in the body's veins. When a blood clot forms in your deep veins, it may be called a deep vein thrombosis, or DVT for short. Blood clots can happen in any part of the body where blood flows, but they are most common in the arms and legs. If a piece of a blood clot breaks free and travels to the lungs, it is called a pulmonary embolus (PE). A PE can be a very serious problem.         Being in the hospital or having surgery can raise your chances of getting a blood clot because you may not be well enough to move around as much as you normally do.         Ways you can help prevent blood clots in the hospital         Wearing SCDs. SCDs stands for Sequential Compression Devices.   SCDs are special sleeves that wrap around your legs  They attach to a pump that fills them with air to gently squeeze your legs every few minutes.   This helps return the blood in your legs to your heart.   SCDs should only be taken off when walking or bathing.   SCDs may not be comfortable, but they can help save your life.               Wearing compression stockings - if your doctor orders them. These special snug fitting stockings gently squeeze your legs to help blood flow.       Walking. Walking helps move the blood in your legs.   If your doctor says it is ok, try walking the halls at least   5 times a day. Ask us to help you get up, so  you don't fall.      Taking any blood thinning medicines your doctor orders.        Page 1 of 2     [facility]; 3/23   Ways you can help prevent blood clots at home       Wearing compression stockings - if your doctor orders them. ? Walking - to help move the blood in your legs.       Taking any blood thinning medicines your doctor orders.      Signs of a blood clot or PE      Tell your doctor or nurse know right away if you have of the problems listed below.    If you are at home, seek medical care right away. Call 911 for chest pain or problems breathing.               Signs of a blood clot (DVT) - such as pain,  swelling, redness or warmth in your arm or leg      Signs of a pulmonary embolism (PE) - such as chest     pain or feeling short of breath

## 2024-05-07 PROCEDURE — RXMED WILLOW AMBULATORY MEDICATION CHARGE

## 2024-05-08 ENCOUNTER — PHARMACY VISIT (OUTPATIENT)
Dept: PHARMACY | Facility: CLINIC | Age: 59
End: 2024-05-08

## 2024-05-08 ENCOUNTER — OFFICE VISIT (OUTPATIENT)
Dept: ORTHOPEDIC SURGERY | Facility: HOSPITAL | Age: 59
End: 2024-05-08
Payer: COMMERCIAL

## 2024-05-08 DIAGNOSIS — M17.11 PRIMARY OSTEOARTHRITIS OF RIGHT KNEE: Primary | ICD-10-CM

## 2024-05-08 LAB — STAPHYLOCOCCUS SPEC CULT: NORMAL

## 2024-05-08 PROCEDURE — 99214 OFFICE O/P EST MOD 30 MIN: CPT | Performed by: STUDENT IN AN ORGANIZED HEALTH CARE EDUCATION/TRAINING PROGRAM

## 2024-05-08 PROCEDURE — RXMED WILLOW AMBULATORY MEDICATION CHARGE

## 2024-05-08 PROCEDURE — 99214 OFFICE O/P EST MOD 30 MIN: CPT | Mod: 57 | Performed by: STUDENT IN AN ORGANIZED HEALTH CARE EDUCATION/TRAINING PROGRAM

## 2024-05-08 RX ORDER — PANTOPRAZOLE SODIUM 40 MG/1
40 TABLET, DELAYED RELEASE ORAL
Qty: 30 TABLET | Refills: 0 | OUTPATIENT
Start: 2024-05-08 | End: 2024-05-10

## 2024-05-08 ASSESSMENT — PAIN - FUNCTIONAL ASSESSMENT: PAIN_FUNCTIONAL_ASSESSMENT: 0-10

## 2024-05-08 ASSESSMENT — PAIN DESCRIPTION - DESCRIPTORS: DESCRIPTORS: ACHING;SORE

## 2024-05-08 ASSESSMENT — PAIN SCALES - GENERAL: PAINLEVEL_OUTOF10: 5 - MODERATE PAIN

## 2024-05-09 PROCEDURE — RXMED WILLOW AMBULATORY MEDICATION CHARGE

## 2024-05-10 ENCOUNTER — PHARMACY VISIT (OUTPATIENT)
Dept: PHARMACY | Facility: CLINIC | Age: 59
End: 2024-05-10
Payer: COMMERCIAL

## 2024-05-10 RX ORDER — PANTOPRAZOLE SODIUM 40 MG/1
40 TABLET, DELAYED RELEASE ORAL
Qty: 30 TABLET | Refills: 0 | Status: SHIPPED | OUTPATIENT
Start: 2024-05-10 | End: 2024-07-06

## 2024-05-10 RX ORDER — MELOXICAM 15 MG/1
15 TABLET ORAL DAILY
Qty: 60 TABLET | Refills: 0 | Status: SHIPPED | OUTPATIENT
Start: 2024-05-10 | End: 2024-07-09

## 2024-05-10 RX ORDER — OXYCODONE HYDROCHLORIDE 5 MG/1
5-10 TABLET ORAL EVERY 6 HOURS PRN
Qty: 40 TABLET | Refills: 0 | Status: SHIPPED | OUTPATIENT
Start: 2024-05-10 | End: 2024-05-19

## 2024-05-10 RX ORDER — NAPROXEN SODIUM 220 MG/1
81 TABLET, FILM COATED ORAL 2 TIMES DAILY
Qty: 84 TABLET | Refills: 0 | Status: SHIPPED | OUTPATIENT
Start: 2024-05-10 | End: 2024-06-25

## 2024-05-10 RX ORDER — ONDANSETRON 4 MG/1
4 TABLET, FILM COATED ORAL EVERY 8 HOURS PRN
Qty: 20 TABLET | Refills: 0 | Status: SHIPPED | OUTPATIENT
Start: 2024-05-10

## 2024-05-10 RX ORDER — CEFADROXIL 500 MG/1
500 CAPSULE ORAL 2 TIMES DAILY
Qty: 14 CAPSULE | Refills: 0 | Status: SHIPPED | OUTPATIENT
Start: 2024-05-10 | End: 2024-05-21

## 2024-05-10 RX ORDER — SENNOSIDES 8.6 MG/1
1 TABLET ORAL DAILY
Qty: 15 TABLET | Refills: 0 | Status: SHIPPED | OUTPATIENT
Start: 2024-05-10 | End: 2024-05-29

## 2024-05-10 RX ORDER — ACETAMINOPHEN 500 MG
1000 TABLET ORAL EVERY 8 HOURS
Qty: 360 TABLET | Refills: 0 | Status: SHIPPED | OUTPATIENT
Start: 2024-05-10 | End: 2024-07-09

## 2024-05-10 RX ORDER — DOCUSATE SODIUM 100 MG/1
100 CAPSULE, LIQUID FILLED ORAL 2 TIMES DAILY
Qty: 30 CAPSULE | Refills: 0 | Status: SHIPPED | OUTPATIENT
Start: 2024-05-10 | End: 2024-05-29

## 2024-05-10 RX ORDER — TRAMADOL HYDROCHLORIDE 50 MG/1
50-100 TABLET ORAL EVERY 6 HOURS PRN
Qty: 40 TABLET | Refills: 0 | Status: SHIPPED | OUTPATIENT
Start: 2024-05-10 | End: 2024-05-19

## 2024-05-11 NOTE — DISCHARGE INSTRUCTIONS
Deepa Gusman MD   Adult Reconstruction and Joint Replacement Surgery  Office: [phone]     Fax: [phone]       Total Knee Arthroplasty   Postoperative Instructions    CONTACT INFORMATION  Deepa Gusman MD  Joint Replacement Surgeon   Edie Vo      [phone]     Rosey Mcgee MBA, BSN, RN-BC  Ortho Coordinator [place]  [phone]    Jorge Luna RN, BSN  Ortho Coordinator [facility]  [phone]    Nena Salazar BSN-BC  Ortho Nurse Navigator  [phone]    DRIVING AFTER SURGERY   Please discuss post-surgical, long-distance travel with your surgeon. You may not drive until you are off narcotics and cleared by your surgical team.     WOUND CARE   A padded wrap (ace wrap) was placed on your knee on the day of surgery. You may remove this on the 2nd day after surgery.     A waterproof dressing was placed over your surgical site. You may shower over this dressing after surgery and pat it dry. Please do not scrub, soak, or submerge your surgical site for at least three weeks after surgery to allow your incision to heal. Avoid using creams and ointments around your surgical site while it is healing.    Your dressing will be removed on post-operative day 7 by your physical therapist. You may leave the incision open to air after the bandage has been removed. Please do not submerge your incision in a hot tub/pool/lake/etc. until cleared by your surgeon. Please contact the office if there are any concerns with your wound.     Pain, swelling, and bruising are normal after total knee replacement surgery. You may alleviate these symptoms by following the post-operative pain regimen that was prescribed, icing your surgical site multiple times a day, and elevating your extremity throughout the day.     ACTIVITY AND PRECAUTIONS  Please follow the post-operative activity precautions that were described to you by your surgical team and physical therapists (if  applicable).    Weightbearing restriction: right weightbearing as tolerated.     DISCHARGE MEDICATIONS  Pain  Please take the pain medications that were prescribed to you according to the prescribed schedule. You may not operate a motor vehicle while taking narcotic medications (e.g. Oxycodone, Tramadol).     Please take Tylenol and NSAIDs (if you are able) on a schedule as discussed in clinic. Please take the prescribed Pantoprazole to protect your stomach from ulceration after surgery while taking NSAIDs.     Please wean off the narcotic pain medications as soon as you are able.     Blood-Thinners  Please take the blood thinning medications that were prescribed according to the instructions from your surgeon. These are typically continued for 6 weeks postoperatively. This schedule may differ if you were already on blood-thinning medication prior to your surgery. ***    Nausea  You may feel nauseous after surgery due to the anesthetics or pain medications you are taking. You may take anti-nausea medication (e.g. Ondansetron) to help with these symptoms.     Stool Softeners   Please take the stool softeners that were prescribed at discharge (e.g. Colace, Senna) while you are on narcotic pain medications to minimize your risk of constipation.    Home Medications   You may restart your home medications at time of discharge according to your discharge instructions.    PHYSICAL THERAPY AND OCCUPATIONAL THERAPY   In-home physical therapy and occupational therapy will start within a few days of your discharge to home. You will transition to outpatient physical therapy around 2-3 weeks after your surgery.     FOLLOW-UP  You will see your surgical team around 2 weeks after surgery for a wound check (unless otherwise arranged) and between 4-6 weeks after surgery for X-rays and clinical evaluation.    CALL YOUR SURGEON IF YOU HAVE:   Drainage that is not contained by your surgical dressing.  Redness, pain or swelling in your  calf.   Severe pain that is not controlled by the pain regimen prescribed.   Fever >101 Fahrenheit presents for at least 48 hours or other signs of infection (wound drainage, redness around your surgical incision, increased joint pain)  Go to the emergency department or call 911 if you experience chest pain, shortness of breath or other emergent symptoms. Do not call your surgeon first.     ANTIBIOTIC PROPHYLAXIS AFTER JOINT REPLACEMENT SURGERY   Although it is a rare occurrence, an artificial joint can become infected by the bloodstream carrying infection from another part of the body to the replaced joint.  Therefore, it is important that any bacterial infection be treated promptly. If you are unsure of whether you need to take antibiotics, please call the office before taking any medication.  We advise that you take antibiotics prior to the following invasive procedures for a lifetime. Please wait at least 3-6 months after joint replacement surgery before undergoing dental work or cleaning.    Please take antibiotics one hour before any of the following procedures:  Routine dental cleaning or dental procedure  Root canal  Podiatry procedures that involve cutting into the skin  Dermatologic procedures that involve cutting into the skin.  (Not required for laser or freezing of skin)  Invasive procedures regarding the urinary tract     Antibiotics are not required for the following procedures:   Manicures/Pedicures  Colonoscopy/Endoscopy/Sigmoidoscopy  Routine gynecologic exams/procedures/PAP smears, D&C's  Cataract/laser eye surgery  Injection or blood work  Routine colds and flu and minor cuts and bruises    You may have a flu shot at any time following your surgery.        Patient Education: Knee Immobilizer   The purpose of wearing the knee immobilizer is to stabilize the knee joint to prevent buckling in the presence of quad (thigh muscle) weakness.  The brace will help prevent falls and support the knee during  ambulation and stairs.  Please read and follow the instructions carefully to maintain your safety.    Be sure to walk every 1-2 hours while awake at home.   You may walk without the brace:  once cleared by home physical therapy (no earlier than 5/15/2024 at 1pm).  IF STILL EXPERIENCING muscle weakness/buckling after the instructed use time, call your surgeon. Keep using the brace until you meet with physical therapist or surgeon.       Why is an immobilizer needed?  Anesthesia (spinal or nerve block) may cause quad weakness that can last for several hours causing knee buckling or knee hyperextension when bearing weight on the leg.  Some patients metabolize medication more slowly than others which may lead to longer lasting anesthesia and prolonged quad weakness.   When should I wear the immobilizer?  Any time you are up walking/completing stairs/standing up/ weight bearing  Wear the immobilizer for the period of time listed above   Remove the brace during periods of rest and personal hygiene  How do I put on the immobilizer?   The larger end of the brace should be at the top of the leg with the smaller end at the ankle  Position the brace with the black fabric metal liners along the inner and outer sides of the knee, the hard lining is also touching back of knee   The opening on the front of the immobilizer should surround the knee joint   Securely Velcro all straps, ensuring they are tight enough to keep the brace in place on the leg  Ensure the immobilizer is not causing numbness/tingling/limited blood flow to the foot/ lower leg  Safety Considerations  You must continue to use your prescribed walker, in addition to the immobilizer, when up and moving  Make sure the immobilizer is applied correctly before getting up to move  Make sure you stand up/ sit down with walker in front of you while wearing the brace

## 2024-05-13 ENCOUNTER — ANESTHESIA EVENT (OUTPATIENT)
Dept: OPERATING ROOM | Facility: HOSPITAL | Age: 59
End: 2024-05-13
Payer: COMMERCIAL

## 2024-05-13 SDOH — HEALTH STABILITY: MENTAL HEALTH: CURRENT SMOKER: 0

## 2024-05-14 ENCOUNTER — HOSPITAL ENCOUNTER (OUTPATIENT)
Facility: HOSPITAL | Age: 59
Setting detail: OUTPATIENT SURGERY
Discharge: HOME | End: 2024-05-14
Attending: STUDENT IN AN ORGANIZED HEALTH CARE EDUCATION/TRAINING PROGRAM | Admitting: STUDENT IN AN ORGANIZED HEALTH CARE EDUCATION/TRAINING PROGRAM
Payer: COMMERCIAL

## 2024-05-14 ENCOUNTER — PHARMACY VISIT (OUTPATIENT)
Dept: PHARMACY | Facility: CLINIC | Age: 59
End: 2024-05-14
Payer: COMMERCIAL

## 2024-05-14 ENCOUNTER — APPOINTMENT (OUTPATIENT)
Dept: RADIOLOGY | Facility: HOSPITAL | Age: 59
End: 2024-05-14
Payer: COMMERCIAL

## 2024-05-14 ENCOUNTER — DOCUMENTATION (OUTPATIENT)
Dept: HOME HEALTH SERVICES | Facility: HOME HEALTH | Age: 59
End: 2024-05-14

## 2024-05-14 ENCOUNTER — ANESTHESIA (OUTPATIENT)
Dept: OPERATING ROOM | Facility: HOSPITAL | Age: 59
End: 2024-05-14
Payer: COMMERCIAL

## 2024-05-14 ENCOUNTER — HOME HEALTH ADMISSION (OUTPATIENT)
Dept: HOME HEALTH SERVICES | Facility: HOME HEALTH | Age: 59
End: 2024-05-14
Payer: COMMERCIAL

## 2024-05-14 VITALS
SYSTOLIC BLOOD PRESSURE: 124 MMHG | WEIGHT: 257.72 LBS | BODY MASS INDEX: 36.9 KG/M2 | TEMPERATURE: 97.7 F | RESPIRATION RATE: 16 BRPM | OXYGEN SATURATION: 96 % | HEART RATE: 77 BPM | HEIGHT: 70 IN | DIASTOLIC BLOOD PRESSURE: 82 MMHG

## 2024-05-14 DIAGNOSIS — M17.11 UNILATERAL PRIMARY OSTEOARTHRITIS, RIGHT KNEE: Primary | ICD-10-CM

## 2024-05-14 LAB
ABO GROUP (TYPE) IN BLOOD: NORMAL
ANTIBODY SCREEN: NORMAL
RH FACTOR (ANTIGEN D): NORMAL

## 2024-05-14 PROCEDURE — C1776 JOINT DEVICE (IMPLANTABLE): HCPCS | Performed by: STUDENT IN AN ORGANIZED HEALTH CARE EDUCATION/TRAINING PROGRAM

## 2024-05-14 PROCEDURE — 86901 BLOOD TYPING SEROLOGIC RH(D): CPT | Performed by: STUDENT IN AN ORGANIZED HEALTH CARE EDUCATION/TRAINING PROGRAM

## 2024-05-14 PROCEDURE — 3700000002 HC GENERAL ANESTHESIA TIME - EACH INCREMENTAL 1 MINUTE: Performed by: STUDENT IN AN ORGANIZED HEALTH CARE EDUCATION/TRAINING PROGRAM

## 2024-05-14 PROCEDURE — 2500000001 HC RX 250 WO HCPCS SELF ADMINISTERED DRUGS (ALT 637 FOR MEDICARE OP)

## 2024-05-14 PROCEDURE — 2500000005 HC RX 250 GENERAL PHARMACY W/O HCPCS: Performed by: ANESTHESIOLOGY

## 2024-05-14 PROCEDURE — 97530 THERAPEUTIC ACTIVITIES: CPT | Mod: GP

## 2024-05-14 PROCEDURE — 2500000004 HC RX 250 GENERAL PHARMACY W/ HCPCS (ALT 636 FOR OP/ED): Performed by: ANESTHESIOLOGY

## 2024-05-14 PROCEDURE — 7100000002 HC RECOVERY ROOM TIME - EACH INCREMENTAL 1 MINUTE: Performed by: STUDENT IN AN ORGANIZED HEALTH CARE EDUCATION/TRAINING PROGRAM

## 2024-05-14 PROCEDURE — 73560 X-RAY EXAM OF KNEE 1 OR 2: CPT | Mod: RT

## 2024-05-14 PROCEDURE — 2500000004 HC RX 250 GENERAL PHARMACY W/ HCPCS (ALT 636 FOR OP/ED): Mod: JZ | Performed by: STUDENT IN AN ORGANIZED HEALTH CARE EDUCATION/TRAINING PROGRAM

## 2024-05-14 PROCEDURE — 36415 COLL VENOUS BLD VENIPUNCTURE: CPT | Performed by: STUDENT IN AN ORGANIZED HEALTH CARE EDUCATION/TRAINING PROGRAM

## 2024-05-14 PROCEDURE — 27447 TOTAL KNEE ARTHROPLASTY: CPT | Performed by: STUDENT IN AN ORGANIZED HEALTH CARE EDUCATION/TRAINING PROGRAM

## 2024-05-14 PROCEDURE — 2500000004 HC RX 250 GENERAL PHARMACY W/ HCPCS (ALT 636 FOR OP/ED): Performed by: ANESTHESIOLOGIST ASSISTANT

## 2024-05-14 PROCEDURE — 2720000007 HC OR 272 NO HCPCS: Performed by: STUDENT IN AN ORGANIZED HEALTH CARE EDUCATION/TRAINING PROGRAM

## 2024-05-14 PROCEDURE — 7100000010 HC PHASE TWO TIME - EACH INCREMENTAL 1 MINUTE: Performed by: STUDENT IN AN ORGANIZED HEALTH CARE EDUCATION/TRAINING PROGRAM

## 2024-05-14 PROCEDURE — 7100000009 HC PHASE TWO TIME - INITIAL BASE CHARGE: Performed by: STUDENT IN AN ORGANIZED HEALTH CARE EDUCATION/TRAINING PROGRAM

## 2024-05-14 PROCEDURE — RXMED WILLOW AMBULATORY MEDICATION CHARGE

## 2024-05-14 PROCEDURE — 94760 N-INVAS EAR/PLS OXIMETRY 1: CPT

## 2024-05-14 PROCEDURE — 7100000001 HC RECOVERY ROOM TIME - INITIAL BASE CHARGE: Performed by: STUDENT IN AN ORGANIZED HEALTH CARE EDUCATION/TRAINING PROGRAM

## 2024-05-14 PROCEDURE — 97116 GAIT TRAINING THERAPY: CPT | Mod: GP

## 2024-05-14 PROCEDURE — 97161 PT EVAL LOW COMPLEX 20 MIN: CPT | Mod: GP

## 2024-05-14 PROCEDURE — 3700000001 HC GENERAL ANESTHESIA TIME - INITIAL BASE CHARGE: Performed by: STUDENT IN AN ORGANIZED HEALTH CARE EDUCATION/TRAINING PROGRAM

## 2024-05-14 PROCEDURE — 73560 X-RAY EXAM OF KNEE 1 OR 2: CPT | Mod: RIGHT SIDE | Performed by: RADIOLOGY

## 2024-05-14 PROCEDURE — 97110 THERAPEUTIC EXERCISES: CPT | Mod: GP

## 2024-05-14 PROCEDURE — 64447 NJX AA&/STRD FEMORAL NRV IMG: CPT | Performed by: ANESTHESIOLOGY

## 2024-05-14 PROCEDURE — 3600000005 HC OR TIME - INITIAL BASE CHARGE - PROCEDURE LEVEL FIVE: Performed by: STUDENT IN AN ORGANIZED HEALTH CARE EDUCATION/TRAINING PROGRAM

## 2024-05-14 PROCEDURE — 3600000010 HC OR TIME - EACH INCREMENTAL 1 MINUTE - PROCEDURE LEVEL FIVE: Performed by: STUDENT IN AN ORGANIZED HEALTH CARE EDUCATION/TRAINING PROGRAM

## 2024-05-14 PROCEDURE — A27447 PR TOTAL KNEE ARTHROPLASTY: Performed by: ANESTHESIOLOGY

## 2024-05-14 PROCEDURE — 2500000004 HC RX 250 GENERAL PHARMACY W/ HCPCS (ALT 636 FOR OP/ED): Performed by: STUDENT IN AN ORGANIZED HEALTH CARE EDUCATION/TRAINING PROGRAM

## 2024-05-14 PROCEDURE — 2780000003 HC OR 278 NO HCPCS: Performed by: STUDENT IN AN ORGANIZED HEALTH CARE EDUCATION/TRAINING PROGRAM

## 2024-05-14 PROCEDURE — 2500000005 HC RX 250 GENERAL PHARMACY W/O HCPCS: Performed by: STUDENT IN AN ORGANIZED HEALTH CARE EDUCATION/TRAINING PROGRAM

## 2024-05-14 PROCEDURE — A27447 PR TOTAL KNEE ARTHROPLASTY: Performed by: ANESTHESIOLOGIST ASSISTANT

## 2024-05-14 PROCEDURE — 96372 THER/PROPH/DIAG INJ SC/IM: CPT | Performed by: STUDENT IN AN ORGANIZED HEALTH CARE EDUCATION/TRAINING PROGRAM

## 2024-05-14 PROCEDURE — 2500000005 HC RX 250 GENERAL PHARMACY W/O HCPCS: Performed by: ANESTHESIOLOGIST ASSISTANT

## 2024-05-14 DEVICE — TIBAL BASE ATTUNE FB, SZ 4 CEM: Type: IMPLANTABLE DEVICE | Site: KNEE | Status: FUNCTIONAL

## 2024-05-14 DEVICE — ATTUNE KNEE SYSTEM FEMORAL CRUCIATE RETAINING SIZE 4 RIGHT CEMENTED
Type: IMPLANTABLE DEVICE | Site: KNEE | Status: FUNCTIONAL
Brand: ATTUNE

## 2024-05-14 DEVICE — ATTUNE PATELLA MEDIALIZED DOME 35MM CEMENTED AOX
Type: IMPLANTABLE DEVICE | Site: KNEE | Status: FUNCTIONAL
Brand: ATTUNE

## 2024-05-14 RX ORDER — CEFAZOLIN SODIUM 2 G/100ML
2 INJECTION, SOLUTION INTRAVENOUS EVERY 8 HOURS
Qty: 200 ML | Refills: 0 | Status: DISCONTINUED | OUTPATIENT
Start: 2024-05-14 | End: 2024-05-14 | Stop reason: HOSPADM

## 2024-05-14 RX ORDER — BUPIVACAINE HCL/EPINEPHRINE 0.5-1:200K
VIAL (ML) INJECTION AS NEEDED
Status: DISCONTINUED | OUTPATIENT
Start: 2024-05-14 | End: 2024-05-14 | Stop reason: HOSPADM

## 2024-05-14 RX ORDER — PANTOPRAZOLE SODIUM 40 MG/1
40 TABLET, DELAYED RELEASE ORAL
Status: DISCONTINUED | OUTPATIENT
Start: 2024-05-15 | End: 2024-05-14 | Stop reason: HOSPADM

## 2024-05-14 RX ORDER — DEXAMETHASONE SODIUM PHOSPHATE 10 MG/ML
INJECTION INTRAMUSCULAR; INTRAVENOUS AS NEEDED
Status: DISCONTINUED | OUTPATIENT
Start: 2024-05-14 | End: 2024-05-14

## 2024-05-14 RX ORDER — LABETALOL HYDROCHLORIDE 5 MG/ML
5 INJECTION, SOLUTION INTRAVENOUS ONCE AS NEEDED
Status: DISCONTINUED | OUTPATIENT
Start: 2024-05-14 | End: 2024-05-14 | Stop reason: HOSPADM

## 2024-05-14 RX ORDER — NALOXONE HYDROCHLORIDE 0.4 MG/ML
0.2 INJECTION, SOLUTION INTRAMUSCULAR; INTRAVENOUS; SUBCUTANEOUS EVERY 5 MIN PRN
Status: DISCONTINUED | OUTPATIENT
Start: 2024-05-14 | End: 2024-05-14 | Stop reason: HOSPADM

## 2024-05-14 RX ORDER — PHENYLEPHRINE HYDROCHLORIDE 10 MG/ML
INJECTION INTRAVENOUS AS NEEDED
Status: DISCONTINUED | OUTPATIENT
Start: 2024-05-14 | End: 2024-05-14

## 2024-05-14 RX ORDER — PROPOFOL 10 MG/ML
INJECTION, EMULSION INTRAVENOUS CONTINUOUS PRN
Status: DISCONTINUED | OUTPATIENT
Start: 2024-05-14 | End: 2024-05-14

## 2024-05-14 RX ORDER — CEFAZOLIN SODIUM 2 G/100ML
2 INJECTION, SOLUTION INTRAVENOUS ONCE
Status: COMPLETED | OUTPATIENT
Start: 2024-05-14 | End: 2024-05-14

## 2024-05-14 RX ORDER — OXYCODONE HYDROCHLORIDE 5 MG/1
10 TABLET ORAL EVERY 6 HOURS PRN
Status: DISCONTINUED | OUTPATIENT
Start: 2024-05-14 | End: 2024-05-14 | Stop reason: HOSPADM

## 2024-05-14 RX ORDER — ASPIRIN 81 MG/1
81 TABLET ORAL 2 TIMES DAILY
Status: DISCONTINUED | OUTPATIENT
Start: 2024-05-14 | End: 2024-05-14 | Stop reason: HOSPADM

## 2024-05-14 RX ORDER — BUPIVACAINE HYDROCHLORIDE 5 MG/ML
INJECTION, SOLUTION PERINEURAL AS NEEDED
Status: DISCONTINUED | OUTPATIENT
Start: 2024-05-14 | End: 2024-05-14

## 2024-05-14 RX ORDER — BUPIVACAINE HYDROCHLORIDE 2.5 MG/ML
INJECTION, SOLUTION INFILTRATION; PERINEURAL AS NEEDED
Status: DISCONTINUED | OUTPATIENT
Start: 2024-05-14 | End: 2024-05-14

## 2024-05-14 RX ORDER — ONDANSETRON HYDROCHLORIDE 2 MG/ML
4 INJECTION, SOLUTION INTRAVENOUS ONCE AS NEEDED
Status: DISCONTINUED | OUTPATIENT
Start: 2024-05-14 | End: 2024-05-14 | Stop reason: HOSPADM

## 2024-05-14 RX ORDER — DOCUSATE SODIUM 100 MG/1
100 CAPSULE, LIQUID FILLED ORAL 2 TIMES DAILY
Status: DISCONTINUED | OUTPATIENT
Start: 2024-05-14 | End: 2024-05-14 | Stop reason: HOSPADM

## 2024-05-14 RX ORDER — CEFAZOLIN 1 G/1
500 INJECTION, POWDER, FOR SOLUTION INTRAVENOUS ONCE
Status: COMPLETED | OUTPATIENT
Start: 2024-05-14 | End: 2024-05-14

## 2024-05-14 RX ORDER — ALBUTEROL SULFATE 0.83 MG/ML
2.5 SOLUTION RESPIRATORY (INHALATION) ONCE AS NEEDED
Status: DISCONTINUED | OUTPATIENT
Start: 2024-05-14 | End: 2024-05-14 | Stop reason: HOSPADM

## 2024-05-14 RX ORDER — OXYCODONE HYDROCHLORIDE 5 MG/1
5 TABLET ORAL EVERY 6 HOURS PRN
Status: DISCONTINUED | OUTPATIENT
Start: 2024-05-14 | End: 2024-05-14 | Stop reason: HOSPADM

## 2024-05-14 RX ORDER — FENTANYL CITRATE 50 UG/ML
50 INJECTION, SOLUTION INTRAMUSCULAR; INTRAVENOUS EVERY 5 MIN PRN
Status: DISCONTINUED | OUTPATIENT
Start: 2024-05-14 | End: 2024-05-14 | Stop reason: HOSPADM

## 2024-05-14 RX ORDER — SODIUM CHLORIDE 9 MG/ML
22 INJECTION INTRAMUSCULAR; INTRAVENOUS; SUBCUTANEOUS ONCE
Status: DISCONTINUED | OUTPATIENT
Start: 2024-05-14 | End: 2024-05-14 | Stop reason: HOSPADM

## 2024-05-14 RX ORDER — BUPIVACAINE HCL/EPINEPHRINE 0.5-1:200K
30 VIAL (ML) INJECTION ONCE
Status: DISCONTINUED | OUTPATIENT
Start: 2024-05-14 | End: 2024-05-14 | Stop reason: HOSPADM

## 2024-05-14 RX ORDER — BUPIVACAINE HYDROCHLORIDE 7.5 MG/ML
INJECTION, SOLUTION EPIDURAL; RETROBULBAR AS NEEDED
Status: DISCONTINUED | OUTPATIENT
Start: 2024-05-14 | End: 2024-05-14

## 2024-05-14 RX ORDER — SODIUM CHLORIDE, SODIUM LACTATE, POTASSIUM CHLORIDE, CALCIUM CHLORIDE 600; 310; 30; 20 MG/100ML; MG/100ML; MG/100ML; MG/100ML
100 INJECTION, SOLUTION INTRAVENOUS CONTINUOUS
Status: DISCONTINUED | OUTPATIENT
Start: 2024-05-14 | End: 2024-05-14 | Stop reason: HOSPADM

## 2024-05-14 RX ORDER — HYDRALAZINE HYDROCHLORIDE 20 MG/ML
5 INJECTION INTRAMUSCULAR; INTRAVENOUS EVERY 30 MIN PRN
Status: DISCONTINUED | OUTPATIENT
Start: 2024-05-14 | End: 2024-05-14 | Stop reason: HOSPADM

## 2024-05-14 RX ORDER — ACETAMINOPHEN 325 MG/1
975 TABLET ORAL EVERY 8 HOURS
Status: DISCONTINUED | OUTPATIENT
Start: 2024-05-14 | End: 2024-05-14 | Stop reason: HOSPADM

## 2024-05-14 RX ORDER — ONDANSETRON HYDROCHLORIDE 2 MG/ML
4 INJECTION, SOLUTION INTRAVENOUS EVERY 8 HOURS PRN
Status: DISCONTINUED | OUTPATIENT
Start: 2024-05-14 | End: 2024-05-14 | Stop reason: HOSPADM

## 2024-05-14 RX ORDER — METHYLPREDNISOLONE ACETATE 40 MG/ML
INJECTION, SUSPENSION INTRA-ARTICULAR; INTRALESIONAL; INTRAMUSCULAR; SOFT TISSUE AS NEEDED
Status: DISCONTINUED | OUTPATIENT
Start: 2024-05-14 | End: 2024-05-14 | Stop reason: HOSPADM

## 2024-05-14 RX ORDER — TRANEXAMIC ACID 650 MG/1
1950 TABLET ORAL ONCE
Qty: 3 TABLET | Refills: 0 | Status: DISCONTINUED | OUTPATIENT
Start: 2024-05-14 | End: 2024-05-14 | Stop reason: HOSPADM

## 2024-05-14 RX ORDER — FENTANYL CITRATE 50 UG/ML
100 INJECTION, SOLUTION INTRAMUSCULAR; INTRAVENOUS ONCE
Status: COMPLETED | OUTPATIENT
Start: 2024-05-14 | End: 2024-05-14

## 2024-05-14 RX ORDER — FENTANYL CITRATE 50 UG/ML
INJECTION, SOLUTION INTRAMUSCULAR; INTRAVENOUS AS NEEDED
Status: DISCONTINUED | OUTPATIENT
Start: 2024-05-14 | End: 2024-05-14

## 2024-05-14 RX ORDER — DIPHENHYDRAMINE HCL 25 MG
25 TABLET ORAL EVERY 6 HOURS PRN
Status: DISCONTINUED | OUTPATIENT
Start: 2024-05-14 | End: 2024-05-14 | Stop reason: HOSPADM

## 2024-05-14 RX ORDER — POLYETHYLENE GLYCOL 3350 17 G/17G
17 POWDER, FOR SOLUTION ORAL DAILY
Status: DISCONTINUED | OUTPATIENT
Start: 2024-05-14 | End: 2024-05-14 | Stop reason: HOSPADM

## 2024-05-14 RX ORDER — TRANEXAMIC ACID 100 MG/ML
INJECTION, SOLUTION INTRAVENOUS AS NEEDED
Status: DISCONTINUED | OUTPATIENT
Start: 2024-05-14 | End: 2024-05-14

## 2024-05-14 RX ORDER — KETOROLAC TROMETHAMINE 15 MG/ML
15 INJECTION, SOLUTION INTRAMUSCULAR; INTRAVENOUS EVERY 6 HOURS
Qty: 4 ML | Refills: 0 | Status: DISCONTINUED | OUTPATIENT
Start: 2024-05-14 | End: 2024-05-14 | Stop reason: HOSPADM

## 2024-05-14 RX ORDER — ONDANSETRON 4 MG/1
4 TABLET, ORALLY DISINTEGRATING ORAL EVERY 8 HOURS PRN
Status: DISCONTINUED | OUTPATIENT
Start: 2024-05-14 | End: 2024-05-14 | Stop reason: HOSPADM

## 2024-05-14 RX ORDER — FERROUS SULFATE 325(65) MG
65 TABLET ORAL
Status: DISCONTINUED | OUTPATIENT
Start: 2024-05-14 | End: 2024-05-14 | Stop reason: HOSPADM

## 2024-05-14 RX ORDER — LIDOCAINE HYDROCHLORIDE 10 MG/ML
INJECTION, SOLUTION INTRAVENOUS AS NEEDED
Status: DISCONTINUED | OUTPATIENT
Start: 2024-05-14 | End: 2024-05-14

## 2024-05-14 RX ORDER — ONDANSETRON HYDROCHLORIDE 2 MG/ML
INJECTION, SOLUTION INTRAVENOUS AS NEEDED
Status: DISCONTINUED | OUTPATIENT
Start: 2024-05-14 | End: 2024-05-14

## 2024-05-14 RX ORDER — MIDAZOLAM HYDROCHLORIDE 1 MG/ML
2 INJECTION, SOLUTION INTRAMUSCULAR; INTRAVENOUS ONCE
Status: COMPLETED | OUTPATIENT
Start: 2024-05-14 | End: 2024-05-14

## 2024-05-14 RX ADMIN — LIDOCAINE HYDROCHLORIDE 30 MG: 10 INJECTION, SOLUTION INTRAVENOUS at 07:32

## 2024-05-14 RX ADMIN — PHENYLEPHRINE HYDROCHLORIDE 50 MCG: 10 INJECTION INTRAVENOUS at 07:52

## 2024-05-14 RX ADMIN — BUPIVACAINE HYDROCHLORIDE 20 ML: 5 INJECTION, SOLUTION PERINEURAL at 07:02

## 2024-05-14 RX ADMIN — FENTANYL CITRATE 50 MCG: 50 INJECTION INTRAMUSCULAR; INTRAVENOUS at 07:38

## 2024-05-14 RX ADMIN — DEXAMETHASONE SODIUM PHOSPHATE 4 MG: 4 INJECTION, SOLUTION INTRAMUSCULAR; INTRAVENOUS at 07:44

## 2024-05-14 RX ADMIN — PHENYLEPHRINE HYDROCHLORIDE 100 MCG: 10 INJECTION INTRAVENOUS at 08:10

## 2024-05-14 RX ADMIN — FENTANYL CITRATE 25 MCG: 50 INJECTION INTRAMUSCULAR; INTRAVENOUS at 07:55

## 2024-05-14 RX ADMIN — DEXAMETHASONE SODIUM PHOSPHATE 10 MG: 10 INJECTION, SOLUTION INTRAMUSCULAR; INTRAVENOUS at 07:02

## 2024-05-14 RX ADMIN — TRANEXAMIC ACID 1000 MG: 100 INJECTION, SOLUTION INTRAVENOUS at 07:37

## 2024-05-14 RX ADMIN — FENTANYL CITRATE 100 MCG: 50 INJECTION INTRAMUSCULAR; INTRAVENOUS at 06:57

## 2024-05-14 RX ADMIN — PROPOFOL 100 MCG/KG/MIN: 10 INJECTION, EMULSION INTRAVENOUS at 07:33

## 2024-05-14 RX ADMIN — PHENYLEPHRINE HYDROCHLORIDE 100 MCG: 10 INJECTION INTRAVENOUS at 09:03

## 2024-05-14 RX ADMIN — OXYCODONE HYDROCHLORIDE 5 MG: 5 TABLET ORAL at 09:55

## 2024-05-14 RX ADMIN — ONDANSETRON 4 MG: 2 INJECTION, SOLUTION INTRAMUSCULAR; INTRAVENOUS at 09:11

## 2024-05-14 RX ADMIN — PHENYLEPHRINE HYDROCHLORIDE 100 MCG: 10 INJECTION INTRAVENOUS at 08:15

## 2024-05-14 RX ADMIN — SODIUM CHLORIDE, SODIUM LACTATE, POTASSIUM CHLORIDE, AND CALCIUM CHLORIDE 100 ML/HR: 600; 310; 30; 20 INJECTION, SOLUTION INTRAVENOUS at 05:46

## 2024-05-14 RX ADMIN — TRANEXAMIC ACID 1000 MG: 100 INJECTION, SOLUTION INTRAVENOUS at 09:01

## 2024-05-14 RX ADMIN — CEFAZOLIN SODIUM 2 G: 2 INJECTION, SOLUTION INTRAVENOUS at 07:11

## 2024-05-14 RX ADMIN — PHENYLEPHRINE HYDROCHLORIDE 100 MCG: 10 INJECTION INTRAVENOUS at 08:48

## 2024-05-14 RX ADMIN — MIDAZOLAM 2 MG: 1 INJECTION INTRAMUSCULAR; INTRAVENOUS at 06:57

## 2024-05-14 RX ADMIN — PROPOFOL 70 MG: 10 INJECTION, EMULSION INTRAVENOUS at 07:32

## 2024-05-14 RX ADMIN — PHENYLEPHRINE HYDROCHLORIDE 100 MCG: 10 INJECTION INTRAVENOUS at 08:22

## 2024-05-14 RX ADMIN — PHENYLEPHRINE HYDROCHLORIDE 100 MCG: 10 INJECTION INTRAVENOUS at 08:59

## 2024-05-14 RX ADMIN — SODIUM CHLORIDE, SODIUM LACTATE, POTASSIUM CHLORIDE, AND CALCIUM CHLORIDE: 600; 310; 30; 20 INJECTION, SOLUTION INTRAVENOUS at 08:34

## 2024-05-14 RX ADMIN — BUPIVACAINE HYDROCHLORIDE 1.2 ML: 7.5 INJECTION, SOLUTION EPIDURAL; RETROBULBAR at 07:26

## 2024-05-14 RX ADMIN — PHENYLEPHRINE HYDROCHLORIDE 100 MCG: 10 INJECTION INTRAVENOUS at 08:40

## 2024-05-14 RX ADMIN — PHENYLEPHRINE HYDROCHLORIDE 150 MCG: 10 INJECTION INTRAVENOUS at 08:28

## 2024-05-14 RX ADMIN — FENTANYL CITRATE 25 MCG: 50 INJECTION INTRAMUSCULAR; INTRAVENOUS at 08:51

## 2024-05-14 RX ADMIN — BUPIVACAINE HYDROCHLORIDE 10 ML: 2.5 INJECTION, SOLUTION INFILTRATION; PERINEURAL at 07:07

## 2024-05-14 ASSESSMENT — PAIN SCALES - GENERAL
PAINLEVEL_OUTOF10: 3
PAINLEVEL_OUTOF10: 0 - NO PAIN
PAINLEVEL_OUTOF10: 0 - NO PAIN
PAINLEVEL_OUTOF10: 4
PAINLEVEL_OUTOF10: 0 - NO PAIN
PAIN_LEVEL: 4
PAINLEVEL_OUTOF10: 0 - NO PAIN
PAINLEVEL_OUTOF10: 3
PAINLEVEL_OUTOF10: 0 - NO PAIN
PAINLEVEL_OUTOF10: 0 - NO PAIN
PAINLEVEL_OUTOF10: 4
PAINLEVEL_OUTOF10: 4
PAINLEVEL_OUTOF10: 0 - NO PAIN
PAINLEVEL_OUTOF10: 2
PAINLEVEL_OUTOF10: 4
PAINLEVEL_OUTOF10: 6
PAINLEVEL_OUTOF10: 2
PAINLEVEL_OUTOF10: 2
PAINLEVEL_OUTOF10: 4

## 2024-05-14 ASSESSMENT — ACTIVITIES OF DAILY LIVING (ADL)
ADLS_ADDRESSED: YES
ADL_ASSISTANCE: INDEPENDENT

## 2024-05-14 ASSESSMENT — PAIN SCALES - WONG BAKER
WONGBAKER_NUMERICALRESPONSE: HURTS LITTLE BIT

## 2024-05-14 ASSESSMENT — COGNITIVE AND FUNCTIONAL STATUS - GENERAL: MOBILITY SCORE: 24

## 2024-05-14 ASSESSMENT — PAIN - FUNCTIONAL ASSESSMENT

## 2024-05-14 ASSESSMENT — PAIN SCALES - PAIN ASSESSMENT IN ADVANCED DEMENTIA (PAINAD)
BREATHING: NORMAL
FACIALEXPRESSION: SMILING OR INEXPRESSIVE
TOTALSCORE: 0
CONSOLABILITY: NO NEED TO CONSOLE
BODYLANGUAGE: RELAXED
CONSOLABILITY: UNABLE TO CONSOLE, DISTRACT OR REASSURE
TOTALSCORE: 4
NEGVOCALIZATION: OCCASIONAL MOAN/GROAN, LOW SPEECH, NEGATIVE/DISAPPROVING QUALITY
BREATHING: NORMAL
FACIALEXPRESSION: SMILING OR INEXPRESSIVE
BODYLANGUAGE: TENSE, DISTRESSED PACING, FIDGETING

## 2024-05-14 ASSESSMENT — PAIN DESCRIPTION - ORIENTATION: ORIENTATION: RIGHT

## 2024-05-14 ASSESSMENT — PAIN DESCRIPTION - DESCRIPTORS
DESCRIPTORS: ACHING
DESCRIPTORS: ACHING
DESCRIPTORS: OTHER (COMMENT)
DESCRIPTORS: ACHING
DESCRIPTORS: ACHING

## 2024-05-14 ASSESSMENT — PAIN DESCRIPTION - LOCATION: LOCATION: KNEE

## 2024-05-14 ASSESSMENT — COLUMBIA-SUICIDE SEVERITY RATING SCALE - C-SSRS
6. HAVE YOU EVER DONE ANYTHING, STARTED TO DO ANYTHING, OR PREPARED TO DO ANYTHING TO END YOUR LIFE?: NO
2. HAVE YOU ACTUALLY HAD ANY THOUGHTS OF KILLING YOURSELF?: NO
1. IN THE PAST MONTH, HAVE YOU WISHED YOU WERE DEAD OR WISHED YOU COULD GO TO SLEEP AND NOT WAKE UP?: NO

## 2024-05-14 NOTE — PERIOPERATIVE NURSING NOTE
"Arrives to pacu 1 jovial laughing with anesthesia. Sspinal L4;5 moving all extremities denies pain , Lungs clear sinus rhythm. Circulation checks within normal limits.. Denies pain and nausea. Drinking water.    0955 \" Starting to micaela a little offered medications prefers to try oral to not delay going to RR.   1024 Pain tolerable rates it as 6 refuses med's offered.     1030 TO Rapid recovery  "

## 2024-05-14 NOTE — OP NOTE
Operative Note    Cristiana Huddleston    5/14/2024    PREOPERATIVE DIAGNOSIS: Right Knee Osteoarthritis    POSTOPERATIVE DIAGNOSIS: Right knee osteoarthritis     Procedure(s):  Arthroplasty Resurfacing Total Knee ( ADDUCTOR CANAL AND IPACK , DEPUY *Rapid Recovery - Wound Class: Class I/ Clean - Incision Closure: Deep and Superficial Layers    Surgeon:  Deepa Gusman MD     First Assist:  Ami PGY-2   SA Caleb Reilly SA     Anesthesia Staff:  Anesthesiologist: Jorge Copeland MD  C-AA: SUDHA Mcgraw    Anesthesia Type: Spinal     Specimens:  No specimens collected    Estimated Blood Loss:  50 ml     Implants:  Implant Name Type Inv. Item Serial No.  Lot No. LRB No. Used Action   SIZE 4, ATTUNE FEMORAL CRUCIATE RETAINING, RIGHT, CEMENTED     [id number] Right 1 Implanted   DOME, PATELLA, MEDIALIZED, 35MM - [id number] Joint Knee DOME, PATELLA, MEDIALIZED, 35MM  DEPUY [id number] Right 1 Implanted   TIBAL BASE ATTUNE FB, SZ 4 BAILEY - [id number] Joint Knee TIBAL BASE ATTUNE FB, SZ 4 BAILEY  DEPUY [id number] Right 1 Implanted   SIMPLEX P ANTIBIOTIC BONE CEMENT W/TOBRAMYCIN (EA)  MFR REPORTS NO LONGER SOLD AS EACH, PLEASE TRANSITION TO CAT#6197-9-010 (PK=10)    [employer] [id number] Right 1 Implanted   SIMPLEX P ANTIBIOTIC BONE CEMENT W/TOBRAMYCIN (EA)  MFR REPORTS NO LONGER SOLD AS EACH, PLEASE TRANSITION TO CAT#6197-9-010 (PK=10)    [employer] [id number] Right 1 Implanted   8.0MM ATTUNE CRUCIATE RETAINING FIXED BEARING TIBIAL INSERT, AOX, SIZE 4, RIGHT, MEDIAL STABILIZED     [id number] Right 1 Implanted       Complications: None    Findings: End-stage osteoarthritis, varus deformity, 14 degrees, 15 degree flexion contracture    Clinical History:  The patient presents with severe osteoarthritis of the knee.  The patient has failed conservative management including activity modification, anti-inflammatory medications, and injections. All options were discussed in detail with the patient and the  patient has decided that they would like to proceed with total knee replacement after informed consent was obtained in the office and on the day of surgery.    Description of procedure:  Informed consent was obtained. The operative site was marked. The patient was transferred to the operating room. A combined spinal epidural anesthetic and saphenous nerve block were performed. The patient received preoperative antibiotics and tranexamic acid.     The operative leg was scrubbed and prepped and draped in standard sterile fashion.  A surgical time out was performed confirming the operative site. Tourniquet use was deferred.     A longitudinal incision approximately 15 cm in length was made centered over the patella. Soft tissue was dissected sharply down to the extensor mechanism. A medial parapatellar arthrotomy was performed to expose the knee joint. Soft tissue was elevated off the proximal medial tibia for exposure. Fat pad was excised. The patella was dislocated laterally.     Attention was first turned to the femur. The femoral canal was opened with a drill and an intramedullary corey was passed. A 3-degree valgus distal femoral cut relative to the anatomic axis was made without difficulty. An additional +2 mm distal femoral cut was made in the setting of 15-degree flexion contracture.     Attention was then turned to the tibia. The extramedullary guide was placed around the tibia. The tibial cutting block was then pinned for a relative varus cut with approximately 5 degrees of posterior slope. The block was then pinned to remove 3 mm of bone from the worn medial side. The tibial cut was made without difficulty.    The extension gap was then checked. I was happy with medial lateral balance and that I could bring the knee out into full extension with a 6 mm spacer in place. Attention was turned back to the femur. The gap  guide was placed and expanded by 6 mm to match extension gap. The sizer was pinned in  place looking at rotation based on posterior condyles, trans-epicondylar axis and AP axis. The femur was sized and pins were drilled for the AP cutting block in 4 degrees of external rotation based on the epicondylar axis and anh's. The AP cutting block was pinned in place and the anterior cut as well as the posterior cut and chamfer cuts were made without difficulty. The AP cutting block was removed and all bone fragments were removed.    A laminar  was placed on the lateral side of the knee. The remaining medial meniscus as well as osteophytes from the posterior aspect of the medial femoral condyle were removed. A second laminar  was placed on the medial side of the knee and remaining lateral meniscus as well as osteophytes from the posterior aspect of the lateral femoral condyle were excised. The PCL was preserved. The flexion gap balance was tested at this point. There was equal balance between flexion and extension as well as medial and lateral.  The femoral sulcus cut was then performed.    Attention was turned back to the tibia. The tibia was sized and a tibial trial was pinned in appropriate rotation. A trial femur was put in place. A cruciate retaining trial polyethylene component was placed, and the knee was taken through range of motion. The knee was stable through an arc of motion and patellar tracking was midline. The trial polyethylene component was removed. Femoral lug holes were drilled. Osteophytes in the femoral notch were removed with an osteotome. The tibial keel punch was performed. A medial downsizing osteotomy was performed using an osteotome with tibial tray in place.     Attention was then turned to the patella. A freehand patellar cut was made without difficulty. Lug holes were drilled for the patella and a trial patella was put in place. The knee was once again taken through range of motion and the knee was stable with excellent patellar tracking through range of  motion.    All trial implants were removed. The posterior capsule was cauterized to minimize postoperative bleeding. A periarticular injection of Marcaine with epinephrine was injected into the periarticular soft tissues. Bone ends were irrigated and dried. A femoral bone plug was placed.     Implants were cemented into place. A trial 7 mm polyethylene liner was put in place to compress implants in extension. Hemostasis was obtained with electrocautery. All excess cement was removed. The cement was allowed to harden. The knee was then taken through a range of motion and I was happy with the balance on the medial and lateral side of the knee through an arc of motion as well as the kinematic tracking.     The trial polyethylene was removed. The locking mechanism was irrigated removing all debris. Excess cement was removed. A final 8 mm medial stabilized polyethylene was put in place and locked into place. The locking mechanism was tested and was intact. The knee was taken through final range of motion to test for stability and patellar tracking. The PCL was recessed slightly from its femoral insertion to optimize femoral rollback. The knee was irrigated with dilute betadine solution and saline pulsatile lavage.    At this point we began our closure. Several #2 ethibonds followed by Zero Vicryls and then #1 stratafix were used for closure of the arthrotomy. Subcutaneous tissue was closed in layers with 0 Vicryl followed by 3-0 Vicryl and skin was closed with 3-0 stratafix and Dermabond. Sterile dressings including Mepilex and Rock Iniguez Dressing were applied. At the end of the case all needle and instrument counts were correct. There were no complications. The patient was transferred to the bed and then recovery room in stable condition. A surgical debrief was performed at the end of the procedure.    A 22 modifier is being added to this case for increased complexity secondary to patient BMI >35.  This led to  increased surgical time, need for additional help in the operating room, and additional retractor use as compared to a standard arthroplasty procedure.    Post-operative Plan:  The patient will be weight-bearing as tolerated. They will receive perioperative antibiotics and be started on aspirin for DVT prophylaxis with mechanical compression devices. They will be discharged from the hospital when pain is controlled and they have met all PT goals.    Attestation Statement:  I was present for and performed all critical portions of the procedure.      Deepa Gusman MD     Date: 5/14/2024  Time: 9:19 AM    This office note was transcribed with dictation software.  Please excuse any typographical errors, program misunderstandings leading to inadvertent insertions or deletions of inappropriate wording, pronoun errors and other unintentional transcription errors not noticed on proof-reading.

## 2024-05-14 NOTE — ANESTHESIA PREPROCEDURE EVALUATION
Patient: Cristiana Huddleston    Procedure Information       Date/Time: 05/14/24 0700    Procedure: Arthroplasty Resurfacing Total Knee ( ADDUCTOR CANAL AND IPACK , DEPUY *Rapid Recovery (Right: Knee) - ADDUCTOR CANAL AND IPACK    Location: [facility] / [facility]    Surgeons: Deepa Gusman MD            Relevant Problems   Neuro   (+) Anxiety      Endocrine   (+) Obesity      Musculoskeletal   (+) Primary localized osteoarthrosis of right lower leg   (+) Unilateral primary osteoarthritis, right knee     Past Surgical History:   Procedure Laterality Date   • APPENDECTOMY     • AUGMENTATION MAMMAPLASTY  May 2000   • BREAST SURGERY      Breast augmentation   • DILATION AND CURETTAGE OF UTERUS  12/03/2002    Suction   • KNEE ARTHROPLASTY     • KNEE ARTHROSCOPY W/ MENISCECTOMY     • LASIK     • RETINAL DETACHMENT REPAIR W/ SCLERAL BUCKLE LE Right 12/19/2022   • TOTAL KNEE ARTHROPLASTY Left 03/12/2024         Clinical information reviewed:                   NPO Detail:  No data recorded     Physical Exam    Airway  Mallampati: II  TM distance: >3 FB  Neck ROM: full     Cardiovascular   Comments: deferred   Dental    Pulmonary   Comments: deferred   Abdominal     Comments: deferred         Anesthesia Plan    History of general anesthesia?: yes  History of complications of general anesthesia?: no    ASA 2     regional, spinal and MAC   (ACB/IPACK)  The patient is not a current smoker.  Education provided regarding risk of obstructive sleep apnea.  intravenous induction   Postoperative administration of opioids is intended.  Anesthetic plan and risks discussed with patient.    Plan discussed with CAA.

## 2024-05-14 NOTE — PROGRESS NOTES
"Orthopedic Postoperative Check     Subjective  Interval History: The patient has mild pain, The patient has been cleared by PT, The patient is tolerating a diet, The patient has no nausea or vomiting, and The patient is moving feet and ankles freely. Pain well controlled on current regimen.     Objective  Vital signs in last 24 hours:  Temp:  [36.2 °C (97.2 °F)-36.7 °C (98.1 °F)] 36.5 °C (97.7 °F)  Heart Rate:  [65-88] 77  Resp:  [14-20] 16  BP: (103-143)/(67-94) 124/82  FiO2 (%):  [21 %] 21 %    Labs:  WBC   Date Value Ref Range Status   03/07/2024 6.8 4.4 - 11.3 x10*3/uL Final     WBC, Urine   Date Value Ref Range Status   10/21/2022 2 0 - 5 /HPF Final     Ketones, Urine   Date Value Ref Range Status   11/03/2023 NEGATIVE NEGATIVE mg/dL Final     Bicarbonate   Date Value Ref Range Status   03/07/2024 27 21 - 32 mmol/L Final     Urea Nitrogen   Date Value Ref Range Status   03/07/2024 15 6 - 23 mg/dL Final     Creatinine   Date Value Ref Range Status   03/07/2024 0.83 0.50 - 1.05 mg/dL Final     Glucose   Date Value Ref Range Status   03/07/2024 94 74 - 99 mg/dL Final     Glucose, Urine   Date Value Ref Range Status   11/03/2023 NEGATIVE NEGATIVE mg/dL Final     Calcium   Date Value Ref Range Status   03/07/2024 9.3 8.6 - 10.3 mg/dL Final     No results found for: \"PT\", \"PTT\", \"INR\"    Physical Exam  NAD  Dressing/Incision c/d/i  Firing TA/EHL/GS  SILT L4-S1 grossly  2+ DP  WWP    Drain: None    Assessment/Plan    58 y.o. female, s/p Procedure(s):  Arthroplasty Resurfacing Total Knee ( ADDUCTOR CANAL AND IPACK , DEPUY *Rapid Recovery,  POD# 0 doing well.   Continue Physical Therapy and Mobilize  Weightbearing as tolerated   Follow-Up Labs  DVT Prophylaxis:  Aspirin 81 mg BID starting POD 0, continue for 6 weeks, Sequential compression devices, and CHASITY hose   Antibiotics: Perioperative cefazolin 2 doses (or until discharge). 7-day course of oral Cefadroxil 500 mg BID    Analgesia: wean to orals. Judicious use of " opioids.   Ice to surgical site every 4 hours   Advance Diet  IS, Bowel regimen  Dispo Planning  Outpatient Follow-Up: Dr. Gusman clinic at 2 weeks postoperatively     Deepa Gusman MD  Adult Reconstruction and Joint Replacement

## 2024-05-14 NOTE — PROGRESS NOTES
Met with Patient at bedside- Patient is s/p Right Total Knee Replacement with Dr. Deepa Gusman.  Discussion with patient included education on the following topics: [facility] Education: Wound Care, Post-Op Activity, Post-Op Precautions, Cold-Therapy, Importance of post-op prescriptions, When to call the Surgeon's Office, Use of MyChart, and When to call 9-1-1.  Patient Did Not Complete - Patient had surgery within the last 12 months class prior to surgery.  Patient is able to verbalize understanding of class content/discussion.  Contact information was provided to patient for support and assistance during the post-operative period.

## 2024-05-14 NOTE — INTERVAL H&P NOTE
History and Physical Update:  I have reviewed the history and physical dated 5/6/24. History and physical reviewed and relevant findings noted.     Patient examined to review pertinent physical findings: No significant changes.   Home medications reviewed: No significant changes.   Allergies reviewed: No significant changes.     ERAS (Enhanced recovery after surgery): yes, TKA    Deepa Gusman MD    Date: 5/14/2024  Time: 6:51 AM        Patient Active Problem List   Diagnosis    Elevated alkaline phosphatase level    Knee pain    Low back pain    Morbid obesity with body mass index (BMI) of 40.0 or higher (Multi)    Primary localized osteoarthrosis of right lower leg    Anxiety    Poor sleep    Nail deformity    Obesity    Unilateral primary osteoarthritis, right knee    Preoperative clearance       Current Outpatient Medications   Medication Instructions    acetaminophen (TYLENOL) 1,000 mg, oral, Every 8 hours    aspirin 81 mg, oral, 2 times daily    aspirin 81 mg, oral, 2 times daily    cefadroxil (DURICEF) 500 mg, oral, 2 times daily, **Please fill this medication on the day of surgery with Dr. Gusman.**    chlorhexidine (Peridex) 0.12 % solution 15 mL, Mouth/Throat, As needed    diphenhydrAMINE-acetaminophen (Tylenol PM Extra Strength)  mg per tablet oral, Nightly PRN    docusate sodium (COLACE) 100 mg, oral, 2 times daily    melatonin 10 mg capsule oral, Nightly PRN    meloxicam (MOBIC) 15 mg, oral, Daily    multivitamin (Daily Multi-Vitamin) tablet 1 tablet, oral, Daily    ondansetron (ZOFRAN) 4 mg, oral, Every 8 hours PRN    oxyCODONE (ROXICODONE) 5-10 mg, oral, Every 6 hours PRN    pantoprazole (PROTONIX) 40 mg, oral, Daily before breakfast, Do not crush, chew, or split.    potassium/magnesium (MAGNESIUM-POTASSIUM ORAL) oral, Daily    sennosides (SENOKOT) 8.6 mg, oral, Daily    traMADol (ULTRAM)  mg, oral, Every 6 hours PRN    traZODone (DESYREL) 100 mg, oral, Nightly    vit  "C/E/Zn/coppr/lutein/zeaxan (PRESERVISION AREDS-2 ORAL) 2 tablets, oral, Daily       Allergies   Allergen Reactions    Chicken Feathers Allergenic Extract Hives     Chicken Feathers: only where skin has been in contact with feathers       Lab Results   Component Value Date    WBC 6.8 03/07/2024    HGB 13.6 03/07/2024    HCT 42.6 03/07/2024    MCV 86 03/07/2024     03/07/2024     No results found for: \"INR\", \"PROTIME\"    Lab Results   Component Value Date    GLUCOSE 94 03/07/2024    CALCIUM 9.3 03/07/2024     03/07/2024    K 4.1 03/07/2024    CO2 27 03/07/2024     03/07/2024    BUN 15 03/07/2024    CREATININE 0.83 03/07/2024     No results found for: \"CKTOTAL\", \"CKMB\", \"CKMBINDEX\", \"TROPONINI\"  Lab Results   Component Value Date    HGBA1C 5.2 03/07/2024       No results found for: \"CRP\"  No results found for: \"SEDRATE\"    "

## 2024-05-14 NOTE — PERIOPERATIVE NURSING NOTE
Patient passed physical therapy.   Discharge instructions provided to patient and family with all questions answered.     Pharmacy called and delivering medications.

## 2024-05-14 NOTE — HH CARE COORDINATION
Home Care received a Referral for Physical Therapy and Occupational Therapy. We have processed the referral for a Start of Care on 5/15.     If you have any questions or concerns, please feel free to contact us at [phone]. Follow the prompts, enter your five digit zip code, and you will be directed to your care team on [facility] 2.

## 2024-05-14 NOTE — BRIEF OP NOTE
Date: 2024  OR Location: [facility]    Name: Cristiana Huddleston, : 1965, Age: 58 y.o., MRN: [id number], Sex: female    Diagnosis  Pre-op Diagnosis     * Unilateral primary osteoarthritis, right knee [M17.11] Post-op Diagnosis     * Unilateral primary osteoarthritis, right knee [M17.11]     Procedures  Arthroplasty Resurfacing Total Knee ( ADDUCTOR CANAL AND IPACK , DEPUY *Rapid Recovery  02256 - VT ARTHRP KNE CONDYLE&PLATU MEDIAL&LAT COMPARTMENTS      Surgeons      * Deepa Gusman - Primary    Resident/Fellow/Other Assistant:  Surgeons and Role:  * No surgeons found with a matching role *    Procedure Summary  Anesthesia: Spinal  ASA: II  Anesthesia Staff: Anesthesiologist: Jorge Copeland MD  C-AA: SUDHA Mcgraw  Estimated Blood Loss: 50mL  Intra-op Medications:   Administrations occurring from 0700 to 0930 on 24:   Medication Name Total Dose   ceFAZolin (Ancef) injection 500 mg 500 mg   methylPREDNISolone acetate (DEPO-Medrol) injection 40 mg   BUPivacaine-EPINEPHrine (Marcaine w/EPI) 0.5 %-1:200,000 injection 30 mL   lactated Ringer's infusion 932.4 mL   ceFAZolin in dextrose (iso-os) (Ancef) IVPB 2 g 2 g              Anesthesia Record               Intraprocedure I/O Totals          Intake    Tranexamic Acid 0.00 mL    The total shown is the total volume documented since Anesthesia Start was filed.    Propofol Drip 0.00 mL    The total shown is the total volume documented since Anesthesia Start was filed.    lactated Ringer's infusion 1000.00 mL    ceFAZolin in dextrose (iso-os) (Ancef) IVPB 2 g 100.00 mL    Total Intake 1100 mL       Output    Est. Blood Loss 50 mL    Total Output 50 mL       Net    Net Volume 1050 mL          Specimen: No specimens collected     Staff:   Circulator: Stephany Caraballo RN  Scrub Person: Flavia Arevalo; Mary Lowery          Findings: see operative report    Complications:  None; patient tolerated the procedure well.     Disposition: PACU - hemodynamically  stable.  Condition: stable  Specimens Collected: No specimens collected

## 2024-05-14 NOTE — ANESTHESIA POSTPROCEDURE EVALUATION
Patient: Cristiana Huddleston    Procedure Summary       Date: 05/14/24 Room / Location: [facility] / Virtual [facility]    Anesthesia Start: 0711 Anesthesia Stop: 0936    Procedure: Arthroplasty Resurfacing Total Knee ( ADDUCTOR CANAL AND IPACK , DEPUY *Rapid Recovery (Right: Knee) Diagnosis:       Unilateral primary osteoarthritis, right knee      (Unilateral primary osteoarthritis, right knee [M17.11])    Surgeons: Deepa Gusman MD Responsible Provider: Jorge Copeland MD    Anesthesia Type: regional, spinal, MAC ASA Status: 2            Anesthesia Type: regional, spinal, MAC    Vitals Value Taken Time   /77 05/14/24 1020   Temp 36.5 °C (97.7 °F) 05/14/24 1020   Pulse 65 05/14/24 1020   Resp 16 05/14/24 1020   SpO2 95 % 05/14/24 1020       Anesthesia Post Evaluation    Patient location during evaluation: PACU  Patient participation: complete - patient participated  Level of consciousness: awake and alert  Pain score: 4  Pain management: satisfactory to patient  Airway patency: patent  Two or more strategies used to mitigate risk of obstructive sleep apnea  Cardiovascular status: acceptable  Respiratory status: acceptable  Hydration status: acceptable  Postoperative Nausea and Vomiting: none  Comments: Spinal regressing expectantly.        No notable events documented.

## 2024-05-14 NOTE — PROGRESS NOTES
Medication Education     Medication education for Cristiana Huddleston was provided to the patient  for the following medication(s):  ASA  APAP - [facility] did not fill as patient had filled on 05/06/24  @ preferred pharmacy  Cefadroxil  Docusate  Meloxicam - [facility] did not fill as patient had filled on 05/06/24  @ preferred pharmacy  Ondansetron  Oxycodone  Pantoprazole - [facility] did not fill as patient had filled on 05/06/24  @ preferred pharmacy  Senna  Tramadol      Medication education provided by a Pharmacist:  Dose, frequency, storage How to take and what to do if a dose is missed Proper dose, indication, possible ADRs How the medication works and benefits of taking it    Identified potential barriers to education:  None    Method(s) of Education:  Verbal Written materials provided and reviewed    An opportunity to ask questions and receive answers was provided.     Assessment of understanding the patient :  2= meets goals/outcomes; pt with recent surgery in March and very familiar with post-op medications    Additional Notes (if applicable): meds 2 beds delivered to patient    Lexie Freedman, MikieD

## 2024-05-14 NOTE — ANESTHESIA PROCEDURE NOTES
Spinal Block    Patient location during procedure: OR  Start time: 5/14/2024 7:15 AM  End time: 5/14/2024 7:26 AM  Reason for block: primary anesthetic and at surgeon's request  Staffing  Performed: SUDHA and attending   Authorized by: Jorge Copeland MD    Performed by: SUDHA Mcgraw    Preanesthetic Checklist  Completed: patient identified, IV checked, site marked, risks and benefits discussed, surgical consent, monitors and equipment checked, pre-op evaluation, timeout performed and sterile techniques followed  Block Timeout  RN/Licensed healthcare professional reads aloud to the Anesthesia provider and entire team: Patient identity, procedure with side and site, patient position, and as applicable the availability of implants/special equipment/special requirements.  Patient on coagulant treatment: no  Timeout performed at: 5/14/2024 7:15 AM  Spinal Block  Patient position: sitting  Prep: Betadine  Sterility prep: cap, drape, gloves, hand hygiene and mask  Sedation level: light sedation  Patient monitoring: blood pressure and continuous pulse oximetry  Approach: midline  Vertebral space: L3-4  Needle  Needle type: Quincke   Needle gauge: 22 G  Free flowing CSF: yes    Assessment  Sensory level: T6 bilateral  Procedure assessment: patient sedated but conversant throughout procedure and patient tolerated procedure well with no immediate complications  Additional Notes  SPINAL PER CAA WITH DIRECT SUPERVISION BY ATTENDING   1% LIDOCAINE LOCAL  CLEAR CSF BEFORE AND AFTER INJECTION

## 2024-05-14 NOTE — PROGRESS NOTES
Physical Therapy Evaluation & Treatment    Patient Name: Cristiana Huddleston  MRN: [id number]  Today's Date: 5/14/2024   Time Calculation  Start Time: 1201  Stop Time: 1300  Time Calculation (min): 59 min    Assessment/Plan   PT Assessment  PT Assessment Results: Decreased strength, Decreased range of motion, Decreased mobility, Impaired sensation, Impaired tone, Orthopedic restrictions, Pain  Rehab Prognosis: Excellent  Evaluation/Treatment Tolerance: Patient tolerated treatment well  Strengths: Ability to acquire knowledge, Access to adaptive/assistive products, Attitude of self, Capable of completing ADLs semi/independent, Insight into problems, Rehab experience, Support of Caregivers  End of Session Communication: Bedside nurse  Assessment Comment: Pt low complexity eval presenting with impaired sensation, increased pain, and deficits to functional mobility following R TKA performed on 5/14/2024. Education regarding TKA precautions provided with handout issued; pt verbalized understanding. Therapeutic exercises performed; HEP handout provided. Pt with knee buckle during initial transfers and ambulation therefore PT donned knee immobilizer to R knee. Pt demonstrated improved stability with decreased assist required from PT and no further knee buckle. Pt able to participate in household ambulation and stair negotiation without incident. RN aware. Pt has prior rehab experience and is highly motivated to return to premorbid level of function for a race she has in September. PT recommends DC home with 24/7 supervision and HHPT.    End of Session Patient Position: Up in chair, Alarm off, not on at start of session with BLE elevated and ice to surgical site. Call light left in reach; patient instructed not to get up on own and verbalized understanding of this. RN aware.    IP OR SWING BED PT PLAN  Inpatient or Swing Bed: Inpatient  PT Plan  Treatment/Interventions: Bed mobility, Transfer training, Gait training, Stair  training, Neuromuscular re-education, Strengthening, Range of motion, Therapeutic exercise, Therapeutic activity, Home exercise program, Positioning  PT Plan: Skilled PT  PT Frequency: BID  PT Discharge Recommendations: Low intensity level of continued care  Equipment Recommended upon Discharge: Wheeled walker  PT Recommended Transfer Status: Stand by assist, Assistive device  PT - OK to Discharge: Yes      Subjective     General Visit Information:  General  Reason for Referral: R TKA (5/14/2024)  Referred By: Dr. Gusman  Past Medical History Relevant to Rehab: OA, LBP, anxiety, syncope; L TKA (3/2024)  Family/Caregiver Present: Yes (sister)  Prior to Session Communication: Bedside nurse  Patient Position Received: On cart  General Comment: Session cleared by RN. Pt very pleasant and agreeable to PT evaluation. Dressing to R knee dry & intact.     Home Living:  Home Living  Type of Home: House  Lives With: Spouse  Home Adaptive Equipment: Walker rolling or standard, Cane  Home Layout: Two level, Stairs to alternate level with rails  Alternate Level Stairs-Rails: Both  Alternate Level Stairs-Number of Steps: 8+8  Home Access: Stairs to enter with rails  Entrance Stairs-Rails: Right  Entrance Stairs-Number of Steps: 3  Bathroom Shower/Tub: Tub/shower unit  Bathroom Equipment: Grab bars in shower, Tub transfer bench  Prior Level of Function:  Prior Function Per Pt/Caregiver Report  Level of [place]: Independent with ADLs and functional transfers  ADL Assistance: Independent  Homemaking Assistance: Independent  Ambulatory Assistance: Needs assistance  Gait: Assistive device (HurryCane for long distances)  Vocational: Full time employment, Works at home  Leisure: yoga, hiking  Prior Function Comments: Pt denies falls prior to admission  Precautions:  Precautions  LE Weight Bearing Status: Weight Bearing as Tolerated  Medical Precautions: Fall precautions  Post-Surgical Precautions: Right total knee  precautions  Braces Applied: R knee immobilizer applied      Objective   Pain:  Pain Assessment  Pain Assessment: 0-10  Pain Score: 4  Pain Type: Surgical pain  Pain Location: Knee  Pain Orientation: Right  Pain Interventions: Cold applied, Ambulation/increased activity, Elevated  Cognition:  Cognition  Overall Cognitive Status: Within Functional Limits  Attention: Within Functional Limits  Memory: Within Funtional Limits  Problem Solving: Within Functional Limits  Safety/Judgement: Within Functional Limits  Insight: Within function limits  Impulsive: Within functional limits    General Assessments:  Activity Tolerance  Endurance: Endurance does not limit participation in activity    Sensation  Light Touch: Partial deficits in the RLE (medial aspect of RLE impaired; LLE WFL)  Proprioception: No apparent deficits    Coordination  Movements are Fluid and Coordinated: Yes    Postural Control  Postural Control: Within Functional Limits    Static Sitting Balance  Static Sitting-Balance Support: Feet supported, Bilateral upper extremity supported  Static Sitting-Level of Assistance: Independent  Dynamic Sitting Balance  Dynamic Sitting-Balance Support: Feet supported  Dynamic Sitting-Balance: Forward lean  Dynamic Sitting-Comments: distant supervision seated in bedside chair during LE dressing    Static Standing Balance  Static Standing-Balance Support: Bilateral upper extremity supported  Static Standing-Level of Assistance: Minimum assistance, Distant supervision  Static Standing-Comment/Number of Minutes: Shorty for inital STS without knee immobilizer improved to DS once immobilizer donned to R knee; with use of rolling walker + gait belt  Dynamic Standing Balance  Dynamic Standing-Balance Support: Bilateral upper extremity supported  Dynamic Standing-Comments: Shorty for inital ambulation bout 3 feet to bedside commode; improved to distant supervision once R knee immobilzier donned; with use of rolling walker + gait  belt  Functional Assessments:  ADL  ADL's Addressed: Yes  LE Dressing Deficit: Thread LLE into underwear, Thread RLE into underwear, Thread LLE into pants, Thread RLE into pants, Don/doff L shoe, Don/doff R shoe (PT provided supervision and verbal cues for sequencing for pt to complete LE dressing seated in bedside chair; pt able to perform task independently)  Functional Assistance: Minimal  Functional Deficit: Commode transfer, Supervision/safety, Verbal cueing (PT provided Shorty and verbal cues for pt to complete transfer to bedside commode; cues for engaging R quad)    Bed Mobility  Bed Mobility: Yes  Bed Mobility 1  Bed Mobility 1: Supine to sitting  Level of Assistance 1: Independent    Transfers  Transfer: Yes  Transfer 1  Transfer From 1: Bed to, Stand to  Transfer to 1: Stand, Commode-standard  Technique 1: Sit to stand, Stand to sit  Transfer Device 1: Walker, Gait belt  Transfer Level of Assistance 1: Minimum assistance, Minimal verbal cues (cues for hand placement and engaging R quad)  Trials/Comments 1: x2  Transfers 2  Transfer From 2: Stand to  Transfer to 2: Chair with arms, Wheelchair  Technique 2: Sit to stand, Stand to sit  Transfer Device 2: Walker, Gait belt (+knee immobilizer)  Transfer Level of Assistance 2: Distant supervision, Minimal verbal cues (cues for hand placement)  Trials/Comments 2: x4    Ambulation/Gait Training  Ambulation/Gait Training Performed: Yes  Ambulation/Gait Training 1  Surface 1: Level tile  Device 1: Rolling walker  Gait Support Devices: Gait belt, Knee immobilizer  Assistance 1: Distant supervision, Minimal verbal cues (cues for sequencing)  Quality of Gait 1: Diminished heel strike, Decreased step length, Antalgic (decreased bushra; step to pattern)  Comments/Distance (ft) 1: 15 feet x2  Ambulation/Gait Training 2  Surface 2: Level tile  Device 2: Rolling walker  Gait Support Devices: Gait belt, Knee immobilizer  Assistance 2: Minimum assistance, Minimal verbal cues  "(cues for sequencing and engage R quad)  Quality of Gait 2: Diminished heel strike, Decreased step length, Antalgic, Soft knee(s), Knee(s) buckle (knee buckle without LOB; decreased bushra; step to pattern)  Comments/Distance (ft) 2: 3 feet x2 to bedside chair    Stairs  Stairs: Yes  Stairs  Rails 1: Bilateral  Device 1: Railing  Support Devices 1: Gait belt (+ knee immobilizer)  Assistance 1: Close supervision, Minimal verbal cues (cues for sequencing)  Comment/Number of Steps 1: three 6\" steps  Stairs 2  Rails 2: Right  Device 2: Railing  Support Devices 2: Gait belt  Assistance 2: Close supervision, Minimal verbal cues (cues for hand placement and sequencing)  Comment/Number of Steps 2: three 6\" steps  Stairs 3  Rails 3: Right  Device 3: Railing, Single point cane  Support Devices 3: Gait belt (+ knee immobilizer)  Assistance 3: Close supervision, Moderate verbal cues (repeated cues for sequencing)  Comment/Number of Stairs 3: three 6\" steps conpleted via step to pattern with cane; pt demonstrated difficulty with sequencing despite cueing and one step commands from PT therefore PT had pt negotiate with use of R rail and encouraged pt to complete stairs this way at home  Extremity/Trunk Assessments:  RUE   RUE : Within Functional Limits  LUE   LUE: Within Functional Limits  RLE   RLE : Exceptions to WFL  AROM RLE (degrees)  RLE AROM Comment: R knee flexion to 90 degrees; full knee extension in supine lacking TKE in standing without immobilizer  Strength RLE  RLE Overall Strength: Greater than or equal to 3/5 as evidenced by functional mobility  LLE   LLE : Within Functional Limits  Treatments:  Therapeutic Exercise  Therapeutic Exercise Performed: Yes .B ankle pumps, R quad sets, R gluteal sets, R heel slides, R SAQ, R hip abduction, and R SLR x 10 reps each.      Therapeutic Activity  Therapeutic Activity Performed: Yes  Therapeutic Activity 1: PT provided pt education on icing/elevating surgical limb, use and " proper fit of knee immobilizer, ther ex, LE dressing, sequencing and body mechanics during functional mobility, use and proper fit of assistive devices, stair negotiation; pt verbalized understanding.  Outcome Measures:  [facility] Basic Mobility  Turning from your back to your side while in a flat bed without using bedrails: None  Moving from lying on your back to sitting on the side of a flat bed without using bedrails: None  Moving to and from bed to chair (including a wheelchair): None  Standing up from a chair using your arms (e.g. wheelchair or bedside chair): None  To walk in hospital room: None  Climbing 3-5 steps with railing: None  Basic Mobility - Total Score: 24        Education Documentation  Handouts, taught by Afsaneh Tinajero PT at 5/14/2024  2:32 PM.  Learner: Family, Patient  Readiness: Eager  Method: Explanation, Demonstration, Handout  Response: Verbalizes Understanding, Demonstrated Understanding    Precautions, taught by Afsaneh Tinajero PT at 5/14/2024  2:32 PM.  Learner: Family, Patient  Readiness: Eager  Method: Explanation, Demonstration, Handout  Response: Verbalizes Understanding, Demonstrated Understanding    Body Mechanics, taught by Afsaneh Tinajero PT at 5/14/2024  2:32 PM.  Learner: Family, Patient  Readiness: Eager  Method: Explanation, Demonstration, Handout  Response: Verbalizes Understanding, Demonstrated Understanding    Home Exercise Program, taught by Afsaneh Tinajero PT at 5/14/2024  2:32 PM.  Learner: Family, Patient  Readiness: Eager  Method: Explanation, Demonstration, Handout  Response: Verbalizes Understanding, Demonstrated Understanding    Mobility Training, taught by Afsaneh Tinajero PT at 5/14/2024  2:32 PM.  Learner: Family, Patient  Readiness: Eager  Method: Explanation, Demonstration, Handout  Response: Verbalizes Understanding, Demonstrated Understanding          Afsaneh Tinajero PT, DPT

## 2024-05-14 NOTE — PERIOPERATIVE NURSING NOTE
Patient to rapid recovery. Discussed plan of care with patient. No family or friends at bedside. Patient tolerating lunch with no issues. RT at bedside.     Patient with strong push/pulls and bilateral knee bend. Pain is minimal. Surgical site clean, dry, and intact with iceman running. PT notified.

## 2024-05-14 NOTE — ANESTHESIA PROCEDURE NOTES
Peripheral Block    Patient location during procedure: pre-op  Start time: 5/14/2024 7:02 AM  End time: 5/14/2024 7:06 AM  Reason for block: at surgeon's request and post-op pain management  Staffing  Performed: attending   Authorized by: Jorge Copeland MD    Performed by: Jorge Copeland MD  Preanesthetic Checklist  Completed: patient identified, IV checked, site marked, risks and benefits discussed, surgical consent, monitors and equipment checked, pre-op evaluation and timeout performed   Timeout performed at: 5/14/2024 6:56 AM  Peripheral Block  Patient position: laying flat  Prep: alcohol swabs, ChloraPrep and site prepped and draped  Patient monitoring: heart rate, cardiac monitor and continuous pulse ox  Block type: adductor canal  Laterality: right  Injection technique: single-shot  Guidance: nerve stimulator and ultrasound guided  Needle  Needle gauge: 21 G  Needle length: 10 cm  Needle localization: ultrasound guidance     image stored in chart  Needle insertion depth: 8 cm  Test dose: negative  Assessment  Injection assessment: negative aspiration for heme, no paresthesia on injection, local visualized surrounding nerve on ultrasound and incremental injection  Paresthesia pain: immediately resolved  Heart rate change: no  Slow fractionated injection: yes  Additional Notes  Dexamethasone 10mg added to local anesthetic.

## 2024-05-15 ENCOUNTER — HOME CARE VISIT (OUTPATIENT)
Dept: HOME HEALTH SERVICES | Facility: HOME HEALTH | Age: 59
End: 2024-05-15
Payer: COMMERCIAL

## 2024-05-15 PROCEDURE — G0151 HHCP-SERV OF PT,EA 15 MIN: HCPCS

## 2024-05-15 PROCEDURE — 0023 HH SOC

## 2024-05-17 SDOH — HEALTH STABILITY: PHYSICAL HEALTH: EXERCISE TYPE: TKA PROTOCOL

## 2024-05-17 SDOH — ECONOMIC STABILITY: HOUSING INSECURITY: HOME SAFETY: DVT PREVENTION, FALL PREVENTION

## 2024-05-17 ASSESSMENT — ENCOUNTER SYMPTOMS
PERSON REPORTING PAIN: PATIENT
PAIN: 1

## 2024-05-17 ASSESSMENT — ACTIVITIES OF DAILY LIVING (ADL)
ENTERING_EXITING_HOME: MINIMUM ASSIST
OASIS_M1830: 03

## 2024-05-19 NOTE — PROGRESS NOTES
" Deepa Gusman MD   Adult Reconstruction and Joint Replacement Surgery  Phone: [phone]     Fax: [phone]     FOLLOW UP      Name: Cristiana Huddleston  : 1965  Date of Visit: 2024    Procedure: Right Total knee replacement  Date: 2024    Chief Complaint: Right Total knee replacement surgery follow-up    History of Present Illness:  The patient is now 2 weeks 1 days out from right Total knee replacement surgery.     The patient has {PAIN:57889}.    Currently taking *** for pain.     The patient has {stiff:35239::\"low\"} stiffness.     Patient is walking with {assistive device:85306} for assistive device.    The patient goes up and down stairs {stairs:87975}.    Patient can walk {Blocks:50500}.    The patient is doing {home; outpatient:23356} physical therapy.    No fevers or drainage from the incision.    There are no concerns.    The patient {activity:33280}.     Physical Exam:  The patient is well appearing, alert and oriented to person place and time.    The incision is well healed. There is no sign of wound complication.    Range of Motion: {rom3:66764::\"20\",\"15\",\"10\",\"5\",\"full extension\"} to {rom4:58373::\"less than 90\",\"90\",\"100\",\"110\",\"120\"} degrees of flexion.    There is no extensor lag.    There is {Blank single:11929::\"no\",\"a small\",\"a large\",\"a moderate\"} effusion in the knee.    There is no instability. The knee is stable to varus-valgus stress and anterior-posterior stress.     Homans sign is negative.    Neurologic, and vascular examinations are normal.    PROMs  Koos Jr-Knee Injury And Osteoarthritis Outcome Score For Joint Replacement    2024 11:46 AM EDT - Filed by Patient   Instructions    The following question concerns the amount of joint stiffness you have experienced during the last week in your knee. Stiffness is a sensation of restriction or slowness in the ease with which you move your knee joint.   How severe is your knee stiffness after first wakening in the " morning? Severe   What amount of knee pain have you experienced the last week during the following activities?   Twisting/pivoting on your knee Severe   Straightening knee fully Severe   Going up or down stairs Severe   Standing upright Moderate   The following questions concern your physical function. By this we mean your ability to move around and to look after yourself. For each of the following activities please indicate the degree of difficulty you have experienced in the last week due to your knee.   Rising from sitting Severe   Bending to floor/ an object Severe   Koos Jr Scoring (range: 0 - 100) 36.93     Ort [facility] 12 Item Health Survey (Vr-12)    4/30/2024 10:32 AM EDT - Filed by Patient   In general, would you say your health is: Fair   The following questions are about activities you might do during a typical day. Does your health now limit you in these activities?  If so, how much?   Moderate activities, such as moving a table, pushing a vacuum , bowling, or playing golf? Yes, Limited A Little   Climbing several flights of stairs? Yes, Limited A Lot   During the past 4 weeks, have you had any of the following problems with your work or other regular daily activities as a result of your physical health?   Accomplished less than you would like. Yes, A Little Of The Time   Were limited in the kind of work or other activities. Yes, A Little Of The Time   During the past 4 weeks, have you had any of the following problems with your work or other regular daily activities as a result of any emotional problems (such as feeling depressed or anxious)?   Accomplished less than you would like Yes, A Little Of The Time   Didn't do work or other activities as carefully as usual Yes, A Little Of The Time   During the past 4 weeks, how much did pain interfere with your normal work (including both work outside the home and house work)? Moderately   How much of the time during the past 4 weeks:    Have you felt calm and peaceful? Some Of The Time   Did you have a lot of energy? Some Of The Time   Have you felt downhearted and blue? Some Of The Time   During the past 4 weeks, how much of the time has your physical health or emotional problems interfered with your social activities (like visiting with friends, relatives, etc.)? Most Of The Time   Compared to one year ago, how would you rate your physical health in general now? Slightly Better   Compared to one year ago, how would you rate your emotional problems (such as feeling anxious, depressed or irritable) now? About The Same   Ort Vr-12 Question Pcs (range: 0 - 100) 34.35   Ort Vr-12 Question McS (range: 0 - 100) 40.3          Imaging:    X-rays were personally reviewed today and show implants in good position with no evidence of complication.    Impression and Plan:    58 y.o. female 2 weeks 1 days from right Total knee replacement.    The patient is doing well following Total knee replacement surgery.  Antibiotic prophylaxis prior to dental procedures were reviewed.  Long term failure mechanisms were reviewed. Discussed importance of long term follow up. A new physical therapy prescription was given to the patient, and we reviewed exercises to be performed at home to work on improving range of motion and strength. The patient was asked to contact the office sooner if there are any concerns. Their questions were answered.     RTC: 6 weeks      X-rays at next visit: Postop TKA series    _____________________  Deepa Gusman MD   Attending Orthopaedic Surgeon  [facility]    [facility]    This office note was transcribed with dictation software.  Please excuse any typographical errors, program misunderstandings leading to inadvertent insertions or deletions of inappropriate wording, pronoun errors and other unintentional transcription errors not noticed on proof-reading.

## 2024-05-21 ENCOUNTER — HOME CARE VISIT (OUTPATIENT)
Dept: HOME HEALTH SERVICES | Facility: HOME HEALTH | Age: 59
End: 2024-05-21
Payer: COMMERCIAL

## 2024-05-21 PROCEDURE — G0151 HHCP-SERV OF PT,EA 15 MIN: HCPCS

## 2024-05-21 ASSESSMENT — ENCOUNTER SYMPTOMS
PAIN LOCATION: RIGHT KNEE
PERSON REPORTING PAIN: PATIENT
PAIN: 1

## 2024-05-22 NOTE — PROGRESS NOTES
Deepa Gusman MD   Adult Reconstruction and Joint Replacement Surgery  Phone: [phone]     Fax: [phone]     FOLLOW UP      Name: Cristiana Huddleston  : 1965  Date of Visit: 2024    Procedure: Right Total knee replacement  Date: 2024    Chief Complaint: Right Total knee replacement surgery follow-up    History of Present Illness:  The patient is now 2 weeks 1 days out from right Total knee replacement surgery.     The patient has mild or occasional pain.    Currently taking tylenol or nothing for pain.     The patient has low stiffness.     Patient is walking with cane for assistive device.    The patient goes up and down stairs normally.    Patient can walk 2-3 blocks.    The patient is doing home physical therapy. Starts outpatient tomorrow.     No fevers or drainage from the incision.    There are no concerns.    The patient is mildly limited.     Physical Exam:  The patient is well appearing, alert and oriented to person place and time.    The incision is well healed. There is no sign of wound complication.    Range of Motion:  3  to 90 degrees of flexion.    There is no extensor lag.    There is a large effusion in the knee.    There is no instability. The knee is stable to varus-valgus stress and anterior-posterior stress.     Homans sign is negative.    Neurologic, and vascular examinations are normal.    PROMs  Koos Jr-Knee Injury And Osteoarthritis Outcome Score For Joint Replacement    2024  2:52 PM EDT - Filed by Patient   Instructions    The following question concerns the amount of joint stiffness you have experienced during the last week in your knee. Stiffness is a sensation of restriction or slowness in the ease with which you move your knee joint.   How severe is your knee stiffness after first wakening in the morning? Moderate   What amount of knee pain have you experienced the last week during the following activities?   Twisting/pivoting on your knee Severe    Straightening knee fully Moderate   Going up or down stairs Moderate   Standing upright Moderate   The following questions concern your physical function. By this we mean your ability to move around and to look after yourself. For each of the following activities please indicate the degree of difficulty you have experienced in the last week due to your knee.   Rising from sitting Mild   Bending to floor/ an object Mild   Koos Jr Scoring (range: 0 - 100) 54.84     Ort [facility] 12 Item Health Survey (Vr-12)    5/22/2024  7:02 AM EDT - Filed by Patient   In general, would you say your health is: Good   The following questions are about activities you might do during a typical day. Does your health now limit you in these activities?  If so, how much?   Moderate activities, such as moving a table, pushing a vacuum , bowling, or playing golf? Yes, Limited A Lot   Climbing several flights of stairs? Yes, Limited A Lot   During the past 4 weeks, have you had any of the following problems with your work or other regular daily activities as a result of your physical health?   Accomplished less than you would like. Yes, Most Of The Time   Were limited in the kind of work or other activities. Yes, Most Of The Time   During the past 4 weeks, have you had any of the following problems with your work or other regular daily activities as a result of any emotional problems (such as feeling depressed or anxious)?   Accomplished less than you would like Yes, Most Of The Time   Didn't do work or other activities as carefully as usual Yes, Some Of The Time   During the past 4 weeks, how much did pain interfere with your normal work (including both work outside the home and house work)? Moderately   How much of the time during the past 4 weeks:   Have you felt calm and peaceful? Some Of The Time   Did you have a lot of energy? A Little Of The Time   Have you felt downhearted and blue? Some Of The Time   During the  past 4 weeks, how much of the time has your physical health or emotional problems interfered with your social activities (like visiting with friends, relatives, etc.)? Some Of The Time   Compared to one year ago, how would you rate your physical health in general now? Slightly Better   Compared to one year ago, how would you rate your emotional problems (such as feeling anxious, depressed or irritable) now? Slightly Better   Ort Vr-12 Question Pcs (range: 0 - 100) 28.97   Ort Vr-12 Question McS (range: 0 - 100) 37.51          Imaging:    X-rays were personally reviewed today and show implants in good position with no evidence of complication.    Impression and Plan:    58 y.o. female 2 weeks 1 days from right Total knee replacement.    The patient is doing well following Total knee replacement surgery.  Antibiotic prophylaxis prior to dental procedures were reviewed.  Long term failure mechanisms were reviewed. Discussed importance of long term follow up. A new physical therapy prescription was given to the patient, and we reviewed exercises to be performed at home to work on improving range of motion and strength. The patient was asked to contact the office sooner if there are any concerns. Their questions were answered.     RTC: 6 weeks      X-rays at next visit: Postop TKA series    _____________________  Deepa Gusman MD   Attending Orthopaedic Surgeon  [facility]    [facility]    This office note was transcribed with dictation software.  Please excuse any typographical errors, program misunderstandings leading to inadvertent insertions or deletions of inappropriate wording, pronoun errors and other unintentional transcription errors not noticed on proof-reading.

## 2024-05-23 ENCOUNTER — HOME CARE VISIT (OUTPATIENT)
Dept: HOME HEALTH SERVICES | Facility: HOME HEALTH | Age: 59
End: 2024-05-23
Payer: COMMERCIAL

## 2024-05-23 PROCEDURE — G0151 HHCP-SERV OF PT,EA 15 MIN: HCPCS

## 2024-05-28 SDOH — HEALTH STABILITY: PHYSICAL HEALTH: EXERCISE TYPE: TKA PROTOCOL

## 2024-05-28 ASSESSMENT — ENCOUNTER SYMPTOMS
PAIN: 1
MUSCLE WEAKNESS: 1
LIMITED RANGE OF MOTION: 1
PERSON REPORTING PAIN: PATIENT
OCCASIONAL FEELINGS OF UNSTEADINESS: 0

## 2024-05-28 ASSESSMENT — ACTIVITIES OF DAILY LIVING (ADL)
OASIS_M1830: 01
HOME_HEALTH_OASIS: 00

## 2024-05-29 ENCOUNTER — APPOINTMENT (OUTPATIENT)
Dept: ORTHOPEDIC SURGERY | Facility: HOSPITAL | Age: 59
End: 2024-05-29
Payer: COMMERCIAL

## 2024-05-29 ENCOUNTER — OFFICE VISIT (OUTPATIENT)
Dept: ORTHOPEDIC SURGERY | Facility: HOSPITAL | Age: 59
End: 2024-05-29
Payer: COMMERCIAL

## 2024-05-29 DIAGNOSIS — Z96.651 S/P TOTAL KNEE ARTHROPLASTY, RIGHT: Primary | ICD-10-CM

## 2024-05-29 PROCEDURE — 99024 POSTOP FOLLOW-UP VISIT: CPT | Performed by: STUDENT IN AN ORGANIZED HEALTH CARE EDUCATION/TRAINING PROGRAM

## 2024-05-29 ASSESSMENT — PAIN DESCRIPTION - DESCRIPTORS: DESCRIPTORS: ACHING;SORE

## 2024-05-29 ASSESSMENT — PAIN SCALES - GENERAL: PAINLEVEL_OUTOF10: 5 - MODERATE PAIN

## 2024-05-29 ASSESSMENT — PAIN - FUNCTIONAL ASSESSMENT: PAIN_FUNCTIONAL_ASSESSMENT: 0-10

## 2024-06-03 PROCEDURE — RXMED WILLOW AMBULATORY MEDICATION CHARGE

## 2024-06-06 ENCOUNTER — PHARMACY VISIT (OUTPATIENT)
Dept: PHARMACY | Facility: CLINIC | Age: 59
End: 2024-06-06
Payer: COMMERCIAL

## 2024-07-03 ENCOUNTER — OFFICE VISIT (OUTPATIENT)
Dept: ORTHOPEDIC SURGERY | Facility: HOSPITAL | Age: 59
End: 2024-07-03
Payer: COMMERCIAL

## 2024-07-03 ENCOUNTER — HOSPITAL ENCOUNTER (OUTPATIENT)
Dept: RADIOLOGY | Facility: HOSPITAL | Age: 59
Discharge: HOME | End: 2024-07-03
Payer: COMMERCIAL

## 2024-07-03 DIAGNOSIS — Z96.651 S/P TOTAL KNEE ARTHROPLASTY, RIGHT: Primary | ICD-10-CM

## 2024-07-03 DIAGNOSIS — Z96.652 S/P TOTAL KNEE ARTHROPLASTY, LEFT: ICD-10-CM

## 2024-07-03 DIAGNOSIS — M17.11 UNILATERAL PRIMARY OSTEOARTHRITIS, RIGHT KNEE: ICD-10-CM

## 2024-07-03 PROCEDURE — 73562 X-RAY EXAM OF KNEE 3: CPT | Mod: RT

## 2024-07-03 PROCEDURE — RXMED WILLOW AMBULATORY MEDICATION CHARGE

## 2024-07-03 PROCEDURE — 99211 OFF/OP EST MAY X REQ PHY/QHP: CPT | Performed by: STUDENT IN AN ORGANIZED HEALTH CARE EDUCATION/TRAINING PROGRAM

## 2024-07-03 ASSESSMENT — PAIN - FUNCTIONAL ASSESSMENT: PAIN_FUNCTIONAL_ASSESSMENT: NO/DENIES PAIN

## 2024-07-03 NOTE — PROGRESS NOTES
Deepa Gusman MD   Adult Reconstruction and Joint Replacement Surgery  Phone: [phone]     Fax: [phone]     FOLLOW UP      Name: Cristiana Huddleston  : 1965  Date of Visit: 7/3/2024    Procedure: Right Total knee replacement  Date: 24     Chief Complaint: Right Total knee replacement surgery follow-up    History of Present Illness:  The patient is now 7 weeks 1 days out from right Total knee replacement surgery. She got back on the horse for the first time since knee replacement surgeries. Walking 1 mile per day. Pressing 130 pounds at the gym.     The patient has no pain.    Currently taking nothing for pain.     The patient has low stiffness.     Patient is walking with nothing for assistive device.    The patient goes up and down stairs normally and using a rail .    Patient can walk unlimited blocks.    The patient is doing outpatient physical therapy.    No fevers or drainage from the incision.    There are no concerns.    The patient is mildly limited.     The patient is also now nearly 4 months out from left total knee replacement surgery.  She is very thrilled with her knee function and flexes almost to 130 degrees and has full extension.  She is very satisfied.    Physical Exam:  The patient is well appearing, alert and oriented to person place and time.    The incision is well healed. There is no sign of wound complication.    Range of Motion: full extension to  115  degrees of flexion.    There is no extensor lag.    There is a moderate effusion in the knee.    There is no instability. The knee is stable to varus-valgus stress and anterior-posterior stress.     Homans sign is negative.    Neurologic, and vascular examinations are normal.    PROMs  Koos Jr-Knee Injury And Osteoarthritis Outcome Score For Joint Replacement    2024  2:52 PM EDT - Filed by Patient   Instructions    The following question concerns the amount of joint stiffness you have experienced during the last  week in your knee. Stiffness is a sensation of restriction or slowness in the ease with which you move your knee joint.   How severe is your knee stiffness after first wakening in the morning? Moderate   What amount of knee pain have you experienced the last week during the following activities?   Twisting/pivoting on your knee Severe   Straightening knee fully Moderate   Going up or down stairs Moderate   Standing upright Moderate   The following questions concern your physical function. By this we mean your ability to move around and to look after yourself. For each of the following activities please indicate the degree of difficulty you have experienced in the last week due to your knee.   Rising from sitting Mild   Bending to floor/ an object Mild   Koos Jr Scoring (range: 0 - 100) 54.84     Ort [facility] 12 Item Health Survey (Vr-12)    5/22/2024  7:02 AM EDT - Filed by Patient   In general, would you say your health is: Good   The following questions are about activities you might do during a typical day. Does your health now limit you in these activities?  If so, how much?   Moderate activities, such as moving a table, pushing a vacuum , bowling, or playing golf? Yes, Limited A Lot   Climbing several flights of stairs? Yes, Limited A Lot   During the past 4 weeks, have you had any of the following problems with your work or other regular daily activities as a result of your physical health?   Accomplished less than you would like. Yes, Most Of The Time   Were limited in the kind of work or other activities. Yes, Most Of The Time   During the past 4 weeks, have you had any of the following problems with your work or other regular daily activities as a result of any emotional problems (such as feeling depressed or anxious)?   Accomplished less than you would like Yes, Most Of The Time   Didn't do work or other activities as carefully as usual Yes, Some Of The Time   During the past 4 weeks,  how much did pain interfere with your normal work (including both work outside the home and house work)? Moderately   How much of the time during the past 4 weeks:   Have you felt calm and peaceful? Some Of The Time   Did you have a lot of energy? A Little Of The Time   Have you felt downhearted and blue? Some Of The Time   During the past 4 weeks, how much of the time has your physical health or emotional problems interfered with your social activities (like visiting with friends, relatives, etc.)? Some Of The Time   Compared to one year ago, how would you rate your physical health in general now? Slightly Better   Compared to one year ago, how would you rate your emotional problems (such as feeling anxious, depressed or irritable) now? Slightly Better   Ort Vr-12 Question Pcs (range: 0 - 100) 28.97   Ort Vr-12 Question McS (range: 0 - 100) 37.51          Imaging:    X-rays were personally reviewed today and show implants in good position with no evidence of complication.    Impression and Plan:    58 y.o. female 7 weeks 1 days from right Total knee replacement.    The patient is doing well following Total knee replacement surgery.  Antibiotic prophylaxis prior to dental procedures were reviewed.  Long term failure mechanisms were reviewed. Discussed importance of long term follow up. A new physical therapy prescription was given to the patient, and we reviewed exercises to be performed at home to work on improving range of motion and strength. The patient was asked to contact the office sooner if there are any concerns. Their questions were answered.     RTC: 3 months     X-rays at next visit: Postop TKA series -  bilateral knees    _____________________  Deepa Gusman MD   Attending Orthopaedic Surgeon  [facility]    [facility]    This office note was transcribed with dictation software.  Please excuse any typographical errors, program misunderstandings  leading to inadvertent insertions or deletions of inappropriate wording, pronoun errors and other unintentional transcription errors not noticed on proof-reading.

## 2024-07-08 ENCOUNTER — TELEPHONE (OUTPATIENT)
Dept: PRIMARY CARE | Facility: CLINIC | Age: 59
End: 2024-07-08
Payer: COMMERCIAL

## 2024-07-08 NOTE — TELEPHONE ENCOUNTER
Cristiana has had a cough for 1 week. Has coughing fits. More of a dry cough, taking vicks vapor cool, it's a spray and cough drops. Did have congestion and sore throat for 4 days and that went away, now has lingering cough.      Do you want to see today ?       Please advise

## 2024-07-08 NOTE — TELEPHONE ENCOUNTER
Likely viral or allergy related as we are seeing this; would take cough medications-such as robitussin, mucinex, desylm and if any signs of post nasal drainage (sore throat) would take claritin or allegra/zrytec to help lessen drainage which is leading to cough  Cough lasts the longest after viruses up to 2 months sometimes   If breathing is ok and not have chest tightness would monitor for now

## 2024-07-10 ENCOUNTER — PHARMACY VISIT (OUTPATIENT)
Dept: PHARMACY | Facility: CLINIC | Age: 59
End: 2024-07-10
Payer: COMMERCIAL

## 2024-07-29 ENCOUNTER — TELEPHONE (OUTPATIENT)
Dept: PRIMARY CARE | Facility: CLINIC | Age: 59
End: 2024-07-29
Payer: COMMERCIAL

## 2024-07-29 NOTE — TELEPHONE ENCOUNTER
Does she feel her heart rate going up?  Does she feel it racing/pounding or just her apple watch is letter her know?    Needs appt to see me first to examine and discuss further

## 2024-07-29 NOTE — TELEPHONE ENCOUNTER
Over the weekend her heart rate went up getting out of car  via Apple watch it was 140, dropped to 110 after drinking water. It could of been from the heat she was outside, she did loose her mom on the 11 th could be stress from that, her mom passed with hypertension cardiovascular disease. It went up again yesterday 165, she sat and drank some water. Not sure if heat related because she is outside. But she is trying to drink more. She is on Nsaids from knee surgery is gonna see about going of that and  Pantoprazole and meloxicam. Thinking she needs a stress test. Heart rate goes back to normal when she drinks and takes a shower. But outside in this heat to take care of her horse.       Please advise

## 2024-07-31 ENCOUNTER — LAB (OUTPATIENT)
Dept: LAB | Facility: LAB | Age: 59
End: 2024-07-31
Payer: COMMERCIAL

## 2024-07-31 ENCOUNTER — OFFICE VISIT (OUTPATIENT)
Dept: PRIMARY CARE | Facility: CLINIC | Age: 59
End: 2024-07-31
Payer: COMMERCIAL

## 2024-07-31 VITALS
BODY MASS INDEX: 35.43 KG/M2 | HEART RATE: 78 BPM | WEIGHT: 246.9 LBS | SYSTOLIC BLOOD PRESSURE: 126 MMHG | DIASTOLIC BLOOD PRESSURE: 86 MMHG

## 2024-07-31 DIAGNOSIS — R00.2 HEART PALPITATIONS: Primary | ICD-10-CM

## 2024-07-31 DIAGNOSIS — R00.2 HEART PALPITATIONS: ICD-10-CM

## 2024-07-31 DIAGNOSIS — R05.3 PERSISTENT COUGH FOR 3 WEEKS OR LONGER: ICD-10-CM

## 2024-07-31 LAB
ALBUMIN SERPL BCP-MCNC: 4.4 G/DL (ref 3.4–5)
ALP SERPL-CCNC: 96 U/L (ref 33–136)
ALT SERPL W P-5'-P-CCNC: 18 U/L (ref 7–45)
ANION GAP SERPL CALC-SCNC: 11 MMOL/L (ref 10–20)
AST SERPL W P-5'-P-CCNC: 13 U/L (ref 9–39)
BILIRUB SERPL-MCNC: 0.7 MG/DL (ref 0–1.2)
BUN SERPL-MCNC: 9 MG/DL (ref 6–23)
CALCIUM SERPL-MCNC: 9.5 MG/DL (ref 8.6–10.6)
CHLORIDE SERPL-SCNC: 108 MMOL/L (ref 98–107)
CO2 SERPL-SCNC: 27 MMOL/L (ref 21–32)
CREAT SERPL-MCNC: 0.87 MG/DL (ref 0.5–1.05)
D DIMER PPP FEU-MCNC: 436 NG/ML FEU
EGFRCR SERPLBLD CKD-EPI 2021: 76 ML/MIN/1.73M*2
ERYTHROCYTE [DISTWIDTH] IN BLOOD BY AUTOMATED COUNT: 13.9 % (ref 11.5–14.5)
GLUCOSE SERPL-MCNC: 109 MG/DL (ref 74–99)
HCT VFR BLD AUTO: 43.8 % (ref 36–46)
HGB BLD-MCNC: 14 G/DL (ref 12–16)
MCH RBC QN AUTO: 28.6 PG (ref 26–34)
MCHC RBC AUTO-ENTMCNC: 32 G/DL (ref 32–36)
MCV RBC AUTO: 89 FL (ref 80–100)
NRBC BLD-RTO: 0 /100 WBCS (ref 0–0)
PLATELET # BLD AUTO: 249 X10*3/UL (ref 150–450)
POTASSIUM SERPL-SCNC: 4.2 MMOL/L (ref 3.5–5.3)
PROT SERPL-MCNC: 6.3 G/DL (ref 6.4–8.2)
RBC # BLD AUTO: 4.9 X10*6/UL (ref 4–5.2)
SODIUM SERPL-SCNC: 142 MMOL/L (ref 136–145)
TSH SERPL-ACNC: 1.94 MIU/L (ref 0.44–3.98)
WBC # BLD AUTO: 6 X10*3/UL (ref 4.4–11.3)

## 2024-07-31 PROCEDURE — 80053 COMPREHEN METABOLIC PANEL: CPT

## 2024-07-31 PROCEDURE — 85027 COMPLETE CBC AUTOMATED: CPT

## 2024-07-31 PROCEDURE — 84443 ASSAY THYROID STIM HORMONE: CPT

## 2024-07-31 PROCEDURE — 85379 FIBRIN DEGRADATION QUANT: CPT

## 2024-07-31 PROCEDURE — 93000 ELECTROCARDIOGRAM COMPLETE: CPT | Performed by: INTERNAL MEDICINE

## 2024-07-31 PROCEDURE — 36415 COLL VENOUS BLD VENIPUNCTURE: CPT

## 2024-07-31 PROCEDURE — 1036F TOBACCO NON-USER: CPT | Performed by: INTERNAL MEDICINE

## 2024-07-31 PROCEDURE — 99214 OFFICE O/P EST MOD 30 MIN: CPT | Performed by: INTERNAL MEDICINE

## 2024-07-31 ASSESSMENT — ENCOUNTER SYMPTOMS
FATIGUE: 0
APPETITE CHANGE: 0
FEVER: 0
PALPITATIONS: 1
HEADACHES: 0
DIZZINESS: 0
ACTIVITY CHANGE: 0
DIAPHORESIS: 0
LIGHT-HEADEDNESS: 1
UNEXPECTED WEIGHT CHANGE: 0
CHILLS: 0

## 2024-07-31 NOTE — ASSESSMENT & PLAN NOTE
EKG here in office was noted to be benign  Will do blood work including d-dimer due to surgery in May-rule out concern for possible PE (low suspicion)  Holter monitor for 2 weeks  Stay hydrated in heat/watch if fasting causing low sugars   Follow up based on results   
Never smoker

## 2024-07-31 NOTE — PROGRESS NOTES
Subjective   Patient ID: Cristiana Huddleston is a 58 y.o. female who presents for Palpitations (Elevated heart rate).    Palpitations   Pertinent negatives include no chest pain, diaphoresis, dizziness or fever.       Pt here in acute visit.  She is having some concern with high heart rate.  She believes this has started roughly second week of June.  She was still in PT for her knee.  She then developed a bad URI/cough-per patient was in bed for solid 2 weeks with intense cough.      She tells me she has been really working hard on weight loss-down 20 lbs since our last visit in April.  She is doing intermittent fasting and exercising.  She finds now when exercising her HR can get as high as 160-170.  She also is having some random periods of elevated HR when doing very little-getting out of car and walking in her barn can cause her HR to go up to 130-140.  She will feel a bit lightheaded when this happens.  She relates these days she may not have ate yet or drank enough water as she should.  When this happens she will sit down and try to drink more water/get out of heat.  She has an apple watch that has notified her several times of occurrences like this.      She had TKR of both knees-last one May 14th.  She just stopped taking ASA 81 mg.  She is doing well from this.      Review of Systems   Constitutional:  Negative for activity change, appetite change, chills, diaphoresis, fatigue, fever and unexpected weight change.   Cardiovascular:  Positive for palpitations. Negative for chest pain and leg swelling.   Neurological:  Positive for light-headedness. Negative for dizziness and headaches.       Objective   /86   Pulse 78   Wt 112 kg (246 lb 14.4 oz)   BMI 35.43 kg/m²    Physical Exam  Constitutional:       Appearance: Normal appearance. She is obese.   Cardiovascular:      Rate and Rhythm: Normal rate and regular rhythm.      Heart sounds: Normal heart sounds.   Pulmonary:      Effort: Pulmonary effort is  normal. No respiratory distress.      Breath sounds: Normal breath sounds. No stridor. No wheezing, rhonchi or rales.   Chest:      Chest wall: No tenderness.   Musculoskeletal:         General: Normal range of motion.      Right lower leg: No edema.      Left lower leg: No edema.      Comments: Healing surgical scars bilateral knees   Lymphadenopathy:      Cervical: No cervical adenopathy.   Neurological:      Mental Status: She is alert and oriented to person, place, and time.   Psychiatric:         Mood and Affect: Mood normal.         Assessment/Plan   Problem List Items Addressed This Visit       Heart palpitations - Primary     EKG here in office was noted to be benign  Will do blood work including d-dimer due to surgery in May-rule out concern for possible PE (low suspicion)  Holter monitor for 2 weeks  Stay hydrated in heat/watch if fasting causing low sugars   Follow up based on results          Relevant Orders    ECG 12 Lead    D-dimer, quantitative    Comprehensive Metabolic Panel    CBC    TSH with reflex to Free T4 if abnormal    Holter or Event Cardiac Monitor     Other Visit Diagnoses       Persistent cough for 3 weeks or longer

## 2024-08-08 ENCOUNTER — APPOINTMENT (OUTPATIENT)
Dept: CARDIOLOGY | Facility: CLINIC | Age: 59
End: 2024-08-08
Payer: COMMERCIAL

## 2024-08-14 ENCOUNTER — APPOINTMENT (OUTPATIENT)
Dept: ORTHOPEDIC SURGERY | Facility: HOSPITAL | Age: 59
End: 2024-08-14
Payer: COMMERCIAL

## 2024-08-19 ENCOUNTER — HOSPITAL ENCOUNTER (OUTPATIENT)
Dept: CARDIOLOGY | Facility: CLINIC | Age: 59
Discharge: HOME | End: 2024-08-19
Payer: COMMERCIAL

## 2024-08-19 DIAGNOSIS — R00.2 HEART PALPITATIONS: ICD-10-CM

## 2024-08-26 ENCOUNTER — HOSPITAL ENCOUNTER (OUTPATIENT)
Dept: CARDIOLOGY | Facility: CLINIC | Age: 59
Discharge: HOME | End: 2024-08-26
Payer: COMMERCIAL

## 2024-08-26 PROCEDURE — 93246 EXT ECG>7D<15D RECORDING: CPT

## 2024-09-04 ENCOUNTER — OFFICE VISIT (OUTPATIENT)
Dept: ORTHOPEDIC SURGERY | Facility: HOSPITAL | Age: 59
End: 2024-09-04
Payer: COMMERCIAL

## 2024-09-04 ENCOUNTER — HOSPITAL ENCOUNTER (OUTPATIENT)
Dept: RADIOLOGY | Facility: HOSPITAL | Age: 59
Discharge: HOME | End: 2024-09-04
Payer: COMMERCIAL

## 2024-09-04 DIAGNOSIS — M17.12 UNILATERAL PRIMARY OSTEOARTHRITIS, LEFT KNEE: ICD-10-CM

## 2024-09-04 DIAGNOSIS — Z96.652 S/P TOTAL KNEE ARTHROPLASTY, LEFT: Primary | ICD-10-CM

## 2024-09-04 PROCEDURE — 73562 X-RAY EXAM OF KNEE 3: CPT | Mod: RT

## 2024-09-04 PROCEDURE — 99213 OFFICE O/P EST LOW 20 MIN: CPT | Performed by: STUDENT IN AN ORGANIZED HEALTH CARE EDUCATION/TRAINING PROGRAM

## 2024-09-04 ASSESSMENT — PAIN - FUNCTIONAL ASSESSMENT: PAIN_FUNCTIONAL_ASSESSMENT: NO/DENIES PAIN

## 2024-09-04 NOTE — PROGRESS NOTES
" Deepa Gusman MD   Adult Reconstruction and Joint Replacement Surgery  Phone: [phone]     Fax: [phone]       Name: Cristiana Huddleston  Age: 58 y.o.   : 1965   Date of Visit: 2024    FOLLOW UP    Procedure: Right Total knee replacement  Date: 24     Chief Complaint: Right Total knee replacement surgery follow-up    History of Present Illness:  The patient is now 3 months  21 days out from right Total knee replacement surgery.     The patient has no pain.    Currently taking nothing for pain.     The patient has low stiffness.     Patient is walking with nothing for assistive device.    The patient goes up and down stairs normally.    Patient can walk unlimited blocks.    The patient is doing outpatient physical therapy.    No fevers or drainage from the incision.    There are no concerns.    The patient is back to all regular activity.     Physical Exam:  The patient is well appearing, alert and oriented to person place and time.    The incision is well healed. There is no sign of wound complication.    Range of Motion: full extension to 120 degrees of flexion.    There is no extensor lag.    There is a small effusion in the knee.    There is no instability. The knee is stable to varus-valgus stress and anterior-posterior stress.     Homans sign is negative.    Neurologic, and vascular examinations are normal.    PROMs   24 11:46 24 14:52 24   \"KOOS, JR. Score\" 42.28 39.63 36.93 54.84 84.6            Imaging:    X-rays were personally reviewed today and show implants in good position with no evidence of complication.    Impression and Plan:    This patient is 3 months 21 days from right Total knee replacement.    The patient is doing well following Total knee replacement surgery.  Antibiotic prophylaxis prior to dental procedures were reviewed.  Long term failure mechanisms were reviewed. Discussed importance of long term follow up. A new physical " therapy prescription was given to the patient, and we reviewed exercises to be performed at home to work on improving range of motion and strength. The patient was asked to contact the office sooner if there are any concerns. Their questions were answered.     RTC: 1 year     X-rays at next visit: Postop TKA series    _____________________  Deepa Gusman MD   Attending Orthopaedic Surgeon  [facility]    [facility]    This office note was transcribed with dictation software.  Please excuse any typographical errors, program misunderstandings leading to inadvertent insertions or deletions of inappropriate wording, pronoun errors and other unintentional transcription errors not noticed on proof-reading.

## 2024-09-12 DIAGNOSIS — F41.9 ANXIETY: ICD-10-CM

## 2024-09-12 DIAGNOSIS — Z72.820 POOR SLEEP: ICD-10-CM

## 2024-09-12 RX ORDER — TRAZODONE HYDROCHLORIDE 50 MG/1
TABLET ORAL
Qty: 180 TABLET | Refills: 1 | Status: SHIPPED | OUTPATIENT
Start: 2024-09-12

## 2024-09-17 ENCOUNTER — TELEPHONE (OUTPATIENT)
Dept: PRIMARY CARE | Facility: CLINIC | Age: 59
End: 2024-09-17
Payer: COMMERCIAL

## 2024-09-17 DIAGNOSIS — I47.10 SVT (SUPRAVENTRICULAR TACHYCARDIA) (CMS-HCC): Primary | ICD-10-CM

## 2024-09-17 PROCEDURE — RXMED WILLOW AMBULATORY MEDICATION CHARGE

## 2024-09-17 RX ORDER — METOPROLOL SUCCINATE 25 MG/1
25 TABLET, EXTENDED RELEASE ORAL DAILY
Qty: 90 TABLET | Refills: 1 | Status: SHIPPED | OUTPATIENT
Start: 2024-09-17 | End: 2025-03-16

## 2024-09-17 NOTE — TELEPHONE ENCOUNTER
If she can lessen caffeine if consuming a good amount of that would try that as this is a big cause of high HR

## 2024-09-17 NOTE — TELEPHONE ENCOUNTER
I sent in metoprolol XL 25 mg to take one a day-if she wishes can start off by taking 1/2 tablet daily and see if that is enough to help manage symptoms   It was sent to [facility]

## 2024-09-17 NOTE — TELEPHONE ENCOUNTER
Called patient to inform on results, patient is willing to try medication but she is also wondering if life style changes could help with the heart beat, like better diet?

## 2024-09-17 NOTE — TELEPHONE ENCOUNTER
----- Message from Miranda Weber sent at 9/14/2024  9:29 AM EDT -----  Holter showed many above the ventricle extra beats but no abnormal arrhythmias noted.  If she is bothered by these racing extra beats we can start medication to manage and lessen their occurrence and if this doesn't work then we would have her see cardiology to discuss other treatment such as ablation

## 2024-09-18 ENCOUNTER — PHARMACY VISIT (OUTPATIENT)
Dept: PHARMACY | Facility: CLINIC | Age: 59
End: 2024-09-18
Payer: COMMERCIAL

## 2024-10-11 PROCEDURE — RXMED WILLOW AMBULATORY MEDICATION CHARGE

## 2024-10-12 ENCOUNTER — PHARMACY VISIT (OUTPATIENT)
Dept: PHARMACY | Facility: CLINIC | Age: 59
End: 2024-10-12
Payer: COMMERCIAL

## 2024-11-08 ENCOUNTER — APPOINTMENT (OUTPATIENT)
Dept: PRIMARY CARE | Facility: CLINIC | Age: 59
End: 2024-11-08
Payer: COMMERCIAL

## 2024-11-15 ENCOUNTER — APPOINTMENT (OUTPATIENT)
Dept: PRIMARY CARE | Facility: CLINIC | Age: 59
End: 2024-11-15
Payer: COMMERCIAL

## 2024-11-15 VITALS
BODY MASS INDEX: 36.5 KG/M2 | OXYGEN SATURATION: 97 % | WEIGHT: 246.4 LBS | SYSTOLIC BLOOD PRESSURE: 139 MMHG | DIASTOLIC BLOOD PRESSURE: 76 MMHG | TEMPERATURE: 98.7 F | HEIGHT: 69 IN | HEART RATE: 63 BPM | RESPIRATION RATE: 16 BRPM

## 2024-11-15 DIAGNOSIS — F41.9 ANXIETY: ICD-10-CM

## 2024-11-15 DIAGNOSIS — Z23 NEED FOR INFLUENZA VACCINATION: ICD-10-CM

## 2024-11-15 DIAGNOSIS — I47.10 SVT (SUPRAVENTRICULAR TACHYCARDIA) (CMS-HCC): ICD-10-CM

## 2024-11-15 DIAGNOSIS — Z72.820 POOR SLEEP: ICD-10-CM

## 2024-11-15 DIAGNOSIS — E66.01 CLASS 2 SEVERE OBESITY DUE TO EXCESS CALORIES WITH SERIOUS COMORBIDITY AND BODY MASS INDEX (BMI) OF 36.0 TO 36.9 IN ADULT: ICD-10-CM

## 2024-11-15 DIAGNOSIS — E66.812 CLASS 2 SEVERE OBESITY DUE TO EXCESS CALORIES WITH SERIOUS COMORBIDITY AND BODY MASS INDEX (BMI) OF 36.0 TO 36.9 IN ADULT: ICD-10-CM

## 2024-11-15 DIAGNOSIS — Z00.00 ROUTINE GENERAL MEDICAL EXAMINATION AT A HEALTH CARE FACILITY: Primary | ICD-10-CM

## 2024-11-15 PROCEDURE — 90656 IIV3 VACC NO PRSV 0.5 ML IM: CPT | Performed by: INTERNAL MEDICINE

## 2024-11-15 PROCEDURE — 1036F TOBACCO NON-USER: CPT | Performed by: INTERNAL MEDICINE

## 2024-11-15 PROCEDURE — 90471 IMMUNIZATION ADMIN: CPT | Performed by: INTERNAL MEDICINE

## 2024-11-15 PROCEDURE — 3008F BODY MASS INDEX DOCD: CPT | Performed by: INTERNAL MEDICINE

## 2024-11-15 PROCEDURE — 99396 PREV VISIT EST AGE 40-64: CPT | Performed by: INTERNAL MEDICINE

## 2024-11-15 RX ORDER — METOPROLOL SUCCINATE 25 MG/1
25 TABLET, EXTENDED RELEASE ORAL DAILY
Qty: 14 TABLET | Refills: 0 | Status: SHIPPED | OUTPATIENT
Start: 2024-11-15 | End: 2024-11-15 | Stop reason: SDUPTHER

## 2024-11-15 RX ORDER — METOPROLOL SUCCINATE 25 MG/1
25 TABLET, EXTENDED RELEASE ORAL DAILY
Qty: 90 TABLET | Refills: 3 | Status: SHIPPED | OUTPATIENT
Start: 2024-11-15 | End: 2025-11-10

## 2024-11-15 ASSESSMENT — ENCOUNTER SYMPTOMS
SEIZURES: 0
BRUISES/BLEEDS EASILY: 0
DIZZINESS: 0
NUMBNESS: 0
APNEA: 0
UNEXPECTED WEIGHT CHANGE: 0
BLOOD IN STOOL: 0
ACTIVITY CHANGE: 0
POLYPHAGIA: 0
HYPERACTIVE: 0
HALLUCINATIONS: 0
VOMITING: 0
BACK PAIN: 0
DECREASED CONCENTRATION: 0
EYE PAIN: 0
SPEECH DIFFICULTY: 0
JOINT SWELLING: 0
WOUND: 0
SLEEP DISTURBANCE: 1
FLANK PAIN: 0
AGITATION: 0
FEVER: 0
FREQUENCY: 0
CONSTIPATION: 0
NERVOUS/ANXIOUS: 1
WEAKNESS: 0
DIAPHORESIS: 0
RECTAL PAIN: 0
FACIAL ASYMMETRY: 0
NECK PAIN: 0
DYSPHORIC MOOD: 0
DIFFICULTY URINATING: 0
COUGH: 0
STRIDOR: 0
RHINORRHEA: 0
LIGHT-HEADEDNESS: 0
NAUSEA: 0
SINUS PAIN: 0
TROUBLE SWALLOWING: 0
EYE DISCHARGE: 0
FACIAL SWELLING: 0
EYE ITCHING: 0
PALPITATIONS: 0
FATIGUE: 1
WHEEZING: 0
SINUS PRESSURE: 0
SHORTNESS OF BREATH: 0
ADENOPATHY: 0
ARTHRALGIAS: 0
DYSURIA: 0
ANAL BLEEDING: 0
CHILLS: 0
ABDOMINAL PAIN: 0
TREMORS: 0
SORE THROAT: 0
VOICE CHANGE: 0
CHOKING: 0
HEMATURIA: 0
NECK STIFFNESS: 0
DIARRHEA: 0
ABDOMINAL DISTENTION: 0
CHEST TIGHTNESS: 0
EYE REDNESS: 0
HEADACHES: 0
POLYDIPSIA: 0
CONFUSION: 0
MYALGIAS: 0
COLOR CHANGE: 0
PHOTOPHOBIA: 0
APPETITE CHANGE: 0

## 2024-11-15 ASSESSMENT — PATIENT HEALTH QUESTIONNAIRE - PHQ9
1. LITTLE INTEREST OR PLEASURE IN DOING THINGS: NOT AT ALL
SUM OF ALL RESPONSES TO PHQ9 QUESTIONS 1 AND 2: 0
2. FEELING DOWN, DEPRESSED OR HOPELESS: NOT AT ALL

## 2024-11-15 ASSESSMENT — PROMIS GLOBAL HEALTH SCALE
RATE_AVERAGE_FATIGUE: MODERATE
EMOTIONAL_PROBLEMS: OFTEN
RATE_PHYSICAL_HEALTH: GOOD
RATE_AVERAGE_PAIN: 2
CARRYOUT_PHYSICAL_ACTIVITIES: MOSTLY
RATE_QUALITY_OF_LIFE: FAIR
RATE_GENERAL_HEALTH: GOOD
CARRYOUT_SOCIAL_ACTIVITIES: GOOD
RATE_SOCIAL_SATISFACTION: GOOD
RATE_MENTAL_HEALTH: FAIR

## 2024-11-15 NOTE — ASSESSMENT & PLAN NOTE
Now that pt had both knees replaced want her to work on better diet; lean protein/fish/veggies and exercise more regularly  If needed discussed Contrave as option to start to help with weight loss

## 2024-11-15 NOTE — PROGRESS NOTES
Subjective   Patient ID: Cristiana Huddleston is a 58 y.o. female who presents for Annual Exam.    HPI    Pt here for full physical.  She is upset regarding the election results.      She needs refills on Metoprolol.      She had normal mammogram in April.  She has no breast issues.  She has not had GYN visit in many years.  She has no pelvic and urination issues.    She had negative Cologuard in 2022 that was negative.  She notes some irritable bowels with mood.    She is having some vision issues-left eye in particular.  She has a cataract on the left.  She is following regularly with eye doc.  She had similar issues with vitreous fluid on the right in the past.      She had both knees replaced and has good rom in both.      She had normal CMP/CBC in July and TSH.            Review of Systems   Constitutional:  Positive for fatigue. Negative for activity change, appetite change, chills, diaphoresis, fever and unexpected weight change.   HENT:  Negative for congestion, dental problem, drooling, ear discharge, ear pain, facial swelling, hearing loss, mouth sores, nosebleeds, postnasal drip, rhinorrhea, sinus pressure, sinus pain, sneezing, sore throat, tinnitus, trouble swallowing and voice change.    Eyes:  Negative for photophobia, pain, discharge, redness, itching and visual disturbance (wears glasses-has cataract-sees eye doc regularly).   Respiratory:  Negative for apnea, cough, choking, chest tightness, shortness of breath, wheezing and stridor.    Cardiovascular:  Negative for chest pain, palpitations and leg swelling.   Gastrointestinal:  Negative for abdominal distention, abdominal pain, anal bleeding, blood in stool, constipation, diarrhea, nausea, rectal pain and vomiting.   Endocrine: Negative for cold intolerance, heat intolerance, polydipsia, polyphagia and polyuria.   Genitourinary:  Negative for decreased urine volume, difficulty urinating, dyspareunia, dysuria, enuresis, flank pain, frequency, genital  "sores, hematuria, menstrual problem, pelvic pain, urgency, vaginal bleeding, vaginal discharge and vaginal pain.   Musculoskeletal:  Negative for arthralgias, back pain, gait problem, joint swelling, myalgias, neck pain and neck stiffness.   Skin:  Negative for color change, pallor, rash and wound.   Allergic/Immunologic: Negative for environmental allergies, food allergies and immunocompromised state.   Neurological:  Negative for dizziness, tremors, seizures, syncope, facial asymmetry, speech difficulty, weakness, light-headedness, numbness and headaches.   Hematological:  Negative for adenopathy. Does not bruise/bleed easily.   Psychiatric/Behavioral:  Positive for sleep disturbance. Negative for agitation, behavioral problems, confusion, decreased concentration, dysphoric mood, hallucinations, self-injury and suicidal ideas. The patient is nervous/anxious. The patient is not hyperactive.        Objective   /76 (BP Location: Right arm, Patient Position: Sitting)   Pulse 63   Temp 37.1 °C (98.7 °F) (Oral)   Resp 16   Ht 1.753 m (5' 9\")   Wt 112 kg (246 lb 6.4 oz)   SpO2 97%   BMI 36.39 kg/m²      Physical Exam  Constitutional:       Appearance: Normal appearance. She is obese.   HENT:      Head: Normocephalic and atraumatic.      Right Ear: Tympanic membrane, ear canal and external ear normal. There is no impacted cerumen.      Left Ear: Tympanic membrane, ear canal and external ear normal. There is no impacted cerumen.      Nose: Nose normal. No congestion or rhinorrhea.      Mouth/Throat:      Mouth: Mucous membranes are moist.      Pharynx: Oropharynx is clear. No oropharyngeal exudate or posterior oropharyngeal erythema.   Eyes:      Extraocular Movements: Extraocular movements intact.      Conjunctiva/sclera: Conjunctivae normal.      Pupils: Pupils are equal, round, and reactive to light.   Neck:      Vascular: No carotid bruit.   Cardiovascular:      Rate and Rhythm: Normal rate and regular " rhythm.      Pulses: Normal pulses.      Heart sounds: Normal heart sounds. No murmur heard.  Pulmonary:      Effort: Pulmonary effort is normal. No respiratory distress.      Breath sounds: Normal breath sounds. No wheezing, rhonchi or rales.   Abdominal:      General: Abdomen is flat. Bowel sounds are normal. There is no distension.      Palpations: Abdomen is soft.      Tenderness: There is no abdominal tenderness.      Hernia: No hernia is present.   Musculoskeletal:         General: No swelling or tenderness. Normal range of motion.      Cervical back: Normal range of motion and neck supple.      Right lower leg: No edema.      Left lower leg: No edema.   Lymphadenopathy:      Cervical: No cervical adenopathy.   Skin:     General: Skin is warm and dry.      Findings: No lesion or rash.   Neurological:      General: No focal deficit present.      Mental Status: She is alert and oriented to person, place, and time.      Cranial Nerves: No cranial nerve deficit.      Sensory: No sensory deficit.      Motor: No weakness.   Psychiatric:         Mood and Affect: Mood normal.         Behavior: Behavior normal.         Thought Content: Thought content normal.         Judgment: Judgment normal.         Assessment/Plan   Problem List Items Addressed This Visit       Anxiety     Would like pt to work on CBT to help manage  More exercise, light therapy, healthy nutrition          Poor sleep     Uses Trazodone nightly with relief          Class 2 severe obesity due to excess calories with serious comorbidity and body mass index (BMI) of 36.0 to 36.9 in adult     Now that pt had both knees replaced want her to work on better diet; lean protein/fish/veggies and exercise more regularly  If needed discussed Contrave as option to start to help with weight loss         SVT (supraventricular tachycardia) (CMS-HCC)     Improved on Metoprolol          Relevant Medications    metoprolol succinate XL (Toprol-XL) 25 mg 24 hr tablet      Other Visit Diagnoses       Routine general medical examination at a health care facility    -  Primary    Relevant Orders    Follow Up In Primary Care - Health Maintenance    Need for influenza vaccination        Relevant Orders    Flu vaccine, trivalent, preservative free, age 6 months and greater (Fluarix/Fluzone/Flulaval) (Completed)

## 2024-11-15 NOTE — PATIENT INSTRUCTIONS
See new GYN when able-Dr. Aria Serrano, Dr. Isis Trammell, Dr. Sandeep Carrasquillo   Mammogram will be due in April of 2025  Continue medication as directed-we did refill today  You got your flu shot today; consider Covid booster (can hold off on RSV for now)  Work hard on weight loss-healthy diet-lean protein/veggies/fish and lower carb/sugar  Exercise with goal of 150 minutes/week  Consider Contrave as possible weight loss aid in future if we are unable to do it with diet/exercise alone   Follow up here in 1 year-sooner if needed

## 2025-03-12 ENCOUNTER — OFFICE VISIT (OUTPATIENT)
Dept: ORTHOPEDIC SURGERY | Facility: HOSPITAL | Age: 60
End: 2025-03-12
Payer: COMMERCIAL

## 2025-03-12 ENCOUNTER — APPOINTMENT (OUTPATIENT)
Dept: RADIOLOGY | Facility: HOSPITAL | Age: 60
End: 2025-03-12
Payer: COMMERCIAL

## 2025-03-12 ENCOUNTER — HOSPITAL ENCOUNTER (OUTPATIENT)
Dept: RADIOLOGY | Facility: HOSPITAL | Age: 60
Discharge: HOME | End: 2025-03-12
Payer: COMMERCIAL

## 2025-03-12 DIAGNOSIS — F41.9 ANXIETY: ICD-10-CM

## 2025-03-12 DIAGNOSIS — Z72.820 POOR SLEEP: ICD-10-CM

## 2025-03-12 DIAGNOSIS — Z96.652 S/P TOTAL KNEE ARTHROPLASTY, LEFT: ICD-10-CM

## 2025-03-12 DIAGNOSIS — Z96.652 S/P TOTAL KNEE ARTHROPLASTY, LEFT: Primary | ICD-10-CM

## 2025-03-12 PROCEDURE — 73562 X-RAY EXAM OF KNEE 3: CPT | Mod: LT

## 2025-03-12 PROCEDURE — 99213 OFFICE O/P EST LOW 20 MIN: CPT | Performed by: STUDENT IN AN ORGANIZED HEALTH CARE EDUCATION/TRAINING PROGRAM

## 2025-03-12 RX ORDER — TRAZODONE HYDROCHLORIDE 50 MG/1
100 TABLET ORAL NIGHTLY
Qty: 180 TABLET | Refills: 1 | Status: SHIPPED | OUTPATIENT
Start: 2025-03-12

## 2025-03-12 NOTE — PROGRESS NOTES
" Deepa Gusman MD   Adult Reconstruction and Joint Replacement Surgery  Phone: [phone]     Fax: [phone]       Name: Cristiana Huddleston  Age: 59 y.o.   : 1965   Date of Visit: 3/12/2025    FOLLOW UP    Procedure: Left Total knee replacement  Date: 2024    Chief Complaint: Left Total knee replacement surgery follow-up    History of Present Illness:  The patient is now 12 months 0 days out from left Total knee replacement surgery.     The patient has returned to horseback riding.     Currently taking tylenol for pain.     The patient has low stiffness.     Patient is walking with nothing for assistive device.    The patient goes up and down stairs using a rail .    Patient can walk 6 blocks.    The patient is doing HEP for physical therapy.    No fevers or drainage from the incision.    There are no concerns.    The patient is back to all regular activity.     Physical Exam:  The patient is well appearing, alert and oriented to person place and time.    The incision is well healed. There is no sign of wound complication.    Range of Motion: full extension to 120 degrees of flexion.    There is no extensor lag.    There is no effusion in the knee.    There is no instability. The knee is stable to varus-valgus stress and anterior-posterior stress.     Homans sign is negative.    Neurologic, and vascular examinations are normal.    PROMs   24 14:52 24 14:58   \"KOOSJR. Score\" 54.84 84.6 84.6       Imaging:    X-rays were personally reviewed today and show implants in good position with no evidence of complication.    Impression and Plan:    This patient is 12 months 0 days from left Total knee replacement.    The patient is doing well following Total knee replacement surgery.  Antibiotic prophylaxis prior to dental procedures were reviewed.  Long term failure mechanisms were reviewed. Discussed importance of long term follow up. Encouraged the patient to continue working on " improving range of motion and strength. The patient was asked to contact the office sooner if there are any concerns. Their questions were answered.     RTC: Annually for TKA surveillance    X-rays at next visit: Postop TKA series    _____________________  Deepa Gusman MD   Attending Orthopaedic Surgeon  [facility]    [facility]    This office note was transcribed with dictation software.  Please excuse any typographical errors, program misunderstandings leading to inadvertent insertions or deletions of inappropriate wording, pronoun errors and other unintentional transcription errors not noticed on proof-reading.

## 2025-09-03 DIAGNOSIS — F41.9 ANXIETY: ICD-10-CM

## 2025-09-03 DIAGNOSIS — Z72.820 POOR SLEEP: ICD-10-CM

## 2025-09-03 RX ORDER — TRAZODONE HYDROCHLORIDE 50 MG/1
100 TABLET ORAL NIGHTLY
Qty: 180 TABLET | Refills: 1 | Status: SHIPPED | OUTPATIENT
Start: 2025-09-03

## 2025-11-28 ENCOUNTER — APPOINTMENT (OUTPATIENT)
Dept: PRIMARY CARE | Facility: CLINIC | Age: 60
End: 2025-11-28
Payer: COMMERCIAL

## (undated) DEVICE — BLADE, SAGITTAL NARROW THICK 2108-152

## (undated) DEVICE — DRAPE, INCISE, ANTIMICROBIAL, IOBAN 2, LARGE, 17 X 23 IN, DISPOSABLE, STERILE

## (undated) DEVICE — BLADE DUAL CUT SAGITTAL SAW STAINLESS STEEL 35 INCH X 64 INCH X .89 MM LATEX FREE REUSABLE

## (undated) DEVICE — SOLUTION, INJECTION, SODIUM CHLORIDE 9%, 3000ML

## (undated) DEVICE — GLOVE, SURGICAL, PROTEXIS NEOPRENE, 7.0, PF, LF

## (undated) DEVICE — Device

## (undated) DEVICE — SUTURE, VICRYL, 3-0, 27 IN, SH

## (undated) DEVICE — MARKER, SURGICAL, SKIN, REG TIP, W/ RULER & LABELS

## (undated) DEVICE — SYRINGE, 6 CC, LUER LOCK, MONOJECT, LF

## (undated) DEVICE — SUTURE, ETHIBOND, P2, V-37, 30 IN, GREEN

## (undated) DEVICE — PREP, SKIN, BETADINE, SOLUTION, 16 OZ

## (undated) DEVICE — IRRIGATION SYSTEM, WOUND, PULSAVAC PLUS

## (undated) DEVICE — CEMENT, MIXEVAC III, 10S BOWL, KNEES

## (undated) DEVICE — WOUND SYSTEM, DEBRIDEMENT & CLEANING, O.R DUOPAK

## (undated) DEVICE — SUTURE, VICRYL, 0, 18 IN, CT-1, UNDYED

## (undated) DEVICE — APPLICATOR, CHLORAPREP, W/ORANGE TINT, 26ML

## (undated) DEVICE — BLADE, SAGITTAL DUAL CUT, 25 X 90 X 1.27

## (undated) DEVICE — GLOVE, SURGICAL, PROTEXIS PI , 7.0, PF, LF

## (undated) DEVICE — SUTURE, STRATAFIX, SPIRAL MONOCRYL PLUS, 3-0, PS-1 45CM, UNDYED

## (undated) DEVICE — SUTURE, STRATAFIX, 3-0, SPIRAL MONOCRYL PLUS, UNDYED 20CM  SH

## (undated) DEVICE — BLADE, SAW, SAGITTAL, DUAL CUT, 18 X 90 X 1.27

## (undated) DEVICE — SYRINGE, 10 CC, LUER LOCK

## (undated) DEVICE — PREP, SKIN, CHLORHEXIDINE GLUCONATE, 2%, 1 QT